# Patient Record
Sex: FEMALE | Race: WHITE | NOT HISPANIC OR LATINO | Employment: FULL TIME | URBAN - METROPOLITAN AREA
[De-identification: names, ages, dates, MRNs, and addresses within clinical notes are randomized per-mention and may not be internally consistent; named-entity substitution may affect disease eponyms.]

---

## 2019-03-22 ENCOUNTER — HOSPITAL ENCOUNTER (OUTPATIENT)
Dept: RADIOLOGY | Facility: HOSPITAL | Age: 59
Discharge: HOME | End: 2019-03-22
Attending: NURSE PRACTITIONER
Payer: COMMERCIAL

## 2019-03-22 ENCOUNTER — OFFICE VISIT (OUTPATIENT)
Dept: FAMILY MEDICINE | Facility: CLINIC | Age: 59
End: 2019-03-22
Payer: COMMERCIAL

## 2019-03-22 VITALS
HEIGHT: 64 IN | TEMPERATURE: 98 F | WEIGHT: 178.81 LBS | SYSTOLIC BLOOD PRESSURE: 138 MMHG | RESPIRATION RATE: 18 BRPM | BODY MASS INDEX: 30.53 KG/M2 | OXYGEN SATURATION: 98 % | HEART RATE: 57 BPM | DIASTOLIC BLOOD PRESSURE: 84 MMHG

## 2019-03-22 DIAGNOSIS — K64.9 HEMORRHOIDS, UNSPECIFIED HEMORRHOID TYPE: ICD-10-CM

## 2019-03-22 DIAGNOSIS — G89.29 CHRONIC RIGHT SHOULDER PAIN: ICD-10-CM

## 2019-03-22 DIAGNOSIS — R20.0 NUMBNESS AND TINGLING OF BOTH FEET: ICD-10-CM

## 2019-03-22 DIAGNOSIS — R91.1 PULMONARY NODULE: ICD-10-CM

## 2019-03-22 DIAGNOSIS — R20.0 NUMBNESS AND TINGLING OF RIGHT ARM: ICD-10-CM

## 2019-03-22 DIAGNOSIS — M54.41 CHRONIC RIGHT-SIDED LOW BACK PAIN WITH RIGHT-SIDED SCIATICA: ICD-10-CM

## 2019-03-22 DIAGNOSIS — G89.29 CHRONIC RIGHT-SIDED LOW BACK PAIN WITH RIGHT-SIDED SCIATICA: ICD-10-CM

## 2019-03-22 DIAGNOSIS — M25.511 CHRONIC RIGHT SHOULDER PAIN: ICD-10-CM

## 2019-03-22 DIAGNOSIS — R20.2 NUMBNESS AND TINGLING OF RIGHT ARM: ICD-10-CM

## 2019-03-22 DIAGNOSIS — F33.1 MODERATE EPISODE OF RECURRENT MAJOR DEPRESSIVE DISORDER: Primary | ICD-10-CM

## 2019-03-22 DIAGNOSIS — R74.8 ELEVATED ALKALINE PHOSPHATASE LEVEL: ICD-10-CM

## 2019-03-22 DIAGNOSIS — K31.84 GASTROPARESIS: ICD-10-CM

## 2019-03-22 DIAGNOSIS — Z11.59 NEED FOR HEPATITIS C SCREENING TEST: ICD-10-CM

## 2019-03-22 DIAGNOSIS — R20.2 NUMBNESS AND TINGLING OF BOTH FEET: ICD-10-CM

## 2019-03-22 DIAGNOSIS — E55.9 VITAMIN D DEFICIENCY: ICD-10-CM

## 2019-03-22 DIAGNOSIS — Z90.3 HISTORY OF GASTRECTOMY: ICD-10-CM

## 2019-03-22 DIAGNOSIS — M54.2 NECK PAIN: ICD-10-CM

## 2019-03-22 DIAGNOSIS — K62.6 RECTAL ULCER: ICD-10-CM

## 2019-03-22 DIAGNOSIS — K62.5 RECTAL BLEEDING: ICD-10-CM

## 2019-03-22 DIAGNOSIS — R19.8 RECTAL PRESSURE: ICD-10-CM

## 2019-03-22 DIAGNOSIS — E03.9 HYPOTHYROIDISM, UNSPECIFIED TYPE: ICD-10-CM

## 2019-03-22 PROCEDURE — 99205 OFFICE O/P NEW HI 60 MIN: CPT | Mod: S$GLB,,, | Performed by: NURSE PRACTITIONER

## 2019-03-22 PROCEDURE — 99999 PR PBB SHADOW E&M-EST. PATIENT-LVL V: ICD-10-PCS | Mod: PBBFAC,,, | Performed by: NURSE PRACTITIONER

## 2019-03-22 PROCEDURE — 72110 X-RAY EXAM L-2 SPINE 4/>VWS: CPT | Mod: TC,FY,PO

## 2019-03-22 PROCEDURE — 3079F DIAST BP 80-89 MM HG: CPT | Mod: CPTII,S$GLB,, | Performed by: NURSE PRACTITIONER

## 2019-03-22 PROCEDURE — 72050 X-RAY EXAM NECK SPINE 4/5VWS: CPT | Mod: 26,,, | Performed by: RADIOLOGY

## 2019-03-22 PROCEDURE — 99999 PR PBB SHADOW E&M-EST. PATIENT-LVL V: CPT | Mod: PBBFAC,,, | Performed by: NURSE PRACTITIONER

## 2019-03-22 PROCEDURE — 3008F BODY MASS INDEX DOCD: CPT | Mod: CPTII,S$GLB,, | Performed by: NURSE PRACTITIONER

## 2019-03-22 PROCEDURE — 3075F PR MOST RECENT SYSTOLIC BLOOD PRESS GE 130-139MM HG: ICD-10-PCS | Mod: CPTII,S$GLB,, | Performed by: NURSE PRACTITIONER

## 2019-03-22 PROCEDURE — 3075F SYST BP GE 130 - 139MM HG: CPT | Mod: CPTII,S$GLB,, | Performed by: NURSE PRACTITIONER

## 2019-03-22 PROCEDURE — 72050 X-RAY EXAM NECK SPINE 4/5VWS: CPT | Mod: TC,FY,PO

## 2019-03-22 PROCEDURE — 72110 XR LUMBAR SPINE COMPLETE 5 VIEW: ICD-10-PCS | Mod: 26,,, | Performed by: RADIOLOGY

## 2019-03-22 PROCEDURE — 3008F PR BODY MASS INDEX (BMI) DOCUMENTED: ICD-10-PCS | Mod: CPTII,S$GLB,, | Performed by: NURSE PRACTITIONER

## 2019-03-22 PROCEDURE — 73030 XR SHOULDER COMPLETE 2 OR MORE VIEWS RIGHT: ICD-10-PCS | Mod: 26,RT,, | Performed by: RADIOLOGY

## 2019-03-22 PROCEDURE — 99205 PR OFFICE/OUTPT VISIT, NEW, LEVL V, 60-74 MIN: ICD-10-PCS | Mod: S$GLB,,, | Performed by: NURSE PRACTITIONER

## 2019-03-22 PROCEDURE — 72110 X-RAY EXAM L-2 SPINE 4/>VWS: CPT | Mod: 26,,, | Performed by: RADIOLOGY

## 2019-03-22 PROCEDURE — 73030 X-RAY EXAM OF SHOULDER: CPT | Mod: 26,RT,, | Performed by: RADIOLOGY

## 2019-03-22 PROCEDURE — 3079F PR MOST RECENT DIASTOLIC BLOOD PRESSURE 80-89 MM HG: ICD-10-PCS | Mod: CPTII,S$GLB,, | Performed by: NURSE PRACTITIONER

## 2019-03-22 PROCEDURE — 72050 XR CERVICAL SPINE COMPLETE 5 VIEW: ICD-10-PCS | Mod: 26,,, | Performed by: RADIOLOGY

## 2019-03-22 PROCEDURE — 73030 X-RAY EXAM OF SHOULDER: CPT | Mod: TC,FY,PO,RT

## 2019-03-22 RX ORDER — BUPROPION HYDROCHLORIDE 300 MG/1
300 TABLET ORAL DAILY
Qty: 90 TABLET | Refills: 1 | Status: SHIPPED | OUTPATIENT
Start: 2019-03-22 | End: 2019-05-22 | Stop reason: SDUPTHER

## 2019-03-22 RX ORDER — ERGOCALCIFEROL 1.25 MG/1
50000 CAPSULE ORAL
Qty: 8 CAPSULE | Refills: 0 | Status: SHIPPED | OUTPATIENT
Start: 2019-03-22 | End: 2019-05-08 | Stop reason: SDUPTHER

## 2019-03-22 RX ORDER — ALENDRONATE SODIUM 70 MG/1
70 TABLET ORAL
COMMUNITY
End: 2019-07-01 | Stop reason: SDUPTHER

## 2019-03-22 RX ORDER — METHYLPREDNISOLONE 4 MG/1
TABLET ORAL
Qty: 1 PACKAGE | Refills: 0 | Status: SHIPPED | OUTPATIENT
Start: 2019-03-22 | End: 2019-04-12

## 2019-03-22 NOTE — PROGRESS NOTES
Subjective:       Patient ID: Chloe Obrien is a 58 y.o. female.    Chief Complaint: Shoulder Pain (right, noticed 4 months ago, tingling and issue raising arm, right leg hurt some times)    This patient is here today as new patient to the clinic.   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   Dr mello - synthroid - hypothyroid only - no surgery   Osteoporosis - per Dex[facility] - on fosamax- encourage to continue taking with  VITD   Has been on contrave with Dr Crespo - but feels that she is having more depression and anxiety and wants to go back to her normal wellbutrin dose and see how she does - does not feel contrave is helping her lose weight     Shoulder Pain    The pain is present in the right shoulder (right neck and numbness into right hand and fingers ). This is a chronic problem. The current episode started more than 1 month ago. There has been no history of extremity trauma. The problem occurs daily. The problem has been gradually worsening. The quality of the pain is described as aching (pressure). Associated symptoms include a limited range of motion, numbness and tingling. Pertinent negatives include no fever, headaches, inability to bear weight, itching, joint locking, joint swelling, stiffness or visual symptoms. Associated symptoms comments: Fingers numb   . The symptoms are aggravated by contact and activity (lying on her shoulder ).     Vitals:    03/22/19 0948   BP: 138/84   Pulse: (!) 57   Resp: 18   Temp: 98.2 °F (36.8 °C)     Review of Systems   Constitutional: Positive for activity change, appetite change and fatigue. Negative for diaphoresis and fever.   HENT: Negative.  Negative for congestion, drooling,  ear discharge, ear pain, hearing loss, mouth sores, nosebleeds, rhinorrhea, sinus pressure, sinus pain, sneezing, sore throat and trouble swallowing.    Eyes: Negative.  Negative for photophobia, discharge, redness and itching.   Respiratory: Negative.  Negative for apnea, cough, choking, chest tightness, shortness of breath and wheezing.    Cardiovascular: Negative.  Negative for chest pain, palpitations and leg swelling.   Gastrointestinal: Positive for abdominal pain, anal bleeding, blood in stool and rectal pain. Negative for abdominal distention, constipation, diarrhea, nausea and vomiting.   Endocrine: Negative.    Genitourinary: Negative.  Negative for decreased urine volume, difficulty urinating, dysuria, enuresis, frequency, genital sores, hematuria, pelvic pain, urgency, vaginal bleeding, vaginal discharge and vaginal pain.   Musculoskeletal: Positive for arthralgias, back pain, myalgias, neck pain and neck stiffness. Negative for gait problem, joint swelling and stiffness.   Skin: Negative.  Negative for color change, itching and rash.   Allergic/Immunologic: Positive for environmental allergies. Negative for food allergies and immunocompromised state.   Neurological: Positive for tingling and numbness. Negative for dizziness, tremors, seizures, syncope, facial asymmetry, speech difficulty, weakness, light-headedness and headaches.   Hematological: Negative.    Psychiatric/Behavioral: Positive for dysphoric mood. Negative for agitation, behavioral problems, confusion, decreased concentration, hallucinations, self-injury, sleep disturbance and suicidal ideas. The patient is nervous/anxious. The patient is not hyperactive.        Past Medical History:   Diagnosis Date    a Mitral valve prolapse     b Hypertension     c Hypercholesterolemia     On Livalo 4 Mg qHS    d Prediabetes     1/25/15 Referred To DM Education; On Glumetza    e Hypothyroidism     On [place] Thyroid    f Osteoporosis     10/21/18  "RXd Fosamax 70 Mg Weekly, And OTC D3 2K IU Daily, And OTC CaCO3 600 Mg Daily    H/O Abnormal Mammogram     F/U Mammograms And U/S Have Been Stable     i 1 PPD X 20+ YRs Tobacco Use Disorder 11/14/2018 11/14/18 I Advocated Smoking Cessation And RXd     i Obstructive Sleep Apnea     i Pulmonary Nodules     Dr. LuisF Evans Will Repeat This In 1 Year; 8/7/18 Low Dose Chest CT = 1 Small Nodule In Each Lung    j Diverticulosis Of Sigmoid, Decending, Transverse, and Ascending Colon; W/O Diverticulitis On 9/24/18 EGD     Dr. KIRAN Heck    j Gastroparesis     Dr. KIRAN Heck On 5/2/17 Continued Her Post Gastric Sleeve Diet    j GERD With H/O Stricture Dilation     Dr. KIRAN Heck    j H/O Adenomatous Colon Polyps     Dr. KIRAN Heck TC On 9/24/18 Rectal Polyps Only; "Repeat TC In 5 YRs"    j H/O H. Pylori Gastritis 2009     Dr. KIRAN Heck; Received Triple Tx Then    j H/O Recurrent Cyclical Nausea     Dr. KIRAN Heck    j H/O Small Rectal Polyps On 9/24/18 TC; Nonbleeding, Biopsies Negative For Malignancy     Dr. Jonh Heck; "Repeat TC IN 5 YRs"    j Medium Internal Hemorrhoids With Rectal Bleeding On 9/24/18 EGD     Dr. KIRAN Heck; Will Schedule Clinic Visit For Hemorrhoid Banding    j Nonalcoholic Steatohepatitis (N.A.S.H.)     Dr. KIRAN Heck On 8/18/16 Ordered A Liver U/S And LFTs    j Obesity S/P Sleeve Gastrectomy With Hiatal hernia Repair 10/2015     Dr. Scot Werner    j RLQ Pain Due To Epiploic Appendagitis 08/2016     Dr. KIRAN Heck TXd This With Augmentin And Naproxen After Cipro And Flagyl Failed    j RUQ Abdominal Pain 5/31/16     6/3/16 ABD/Pelvic CT W/W/O = 1 CM Right Renal Stone Only; 5/31/16 ABD XRays = Unremarkable; 5/31/16 Increased Her PPI To Bid X 1 Week    k H/O Nephrolithiasis     6/3/16 ABD/Pelvic CT W/W/O = 1 CM Right Renal Stone Only; 5/26/15 ABD/Pelvic CT With IV Contrast Only = Positive For A " Nonobstructing Right Renal Stone    l Chronic Recurrent Left Foreleg Pain     n Chronic Insomnia `    On Restoril 30 Mg qHS PRN, Trazodone 100 Mg qHS Caused Dizziness And Headaches    n Depression     12/21/16 Decreased Wellbutrin Further And Increased Zoloft To 100 Mg Daily; 11/8/16 Decreased Wellbutrin-XL To 300 Mg qAM And RXd Zoloft 50 Mg qAM; She Sees A Therapist For This; Lexapro Was Innefective    o Ocular Migraines     q Borderline Vitamin D Deficiency     10/21/18 And 4/4/16 RXd OTC D3 2K Daily    q H/O Abnormal Mammogram     F/U Mammograms And U/S Have Been Stable    Wellness Visit 10/10/2018      Objective:      Physical Exam   Constitutional: She is oriented to person, place, and time. She appears well-developed and well-nourished.   HENT:   Head: Normocephalic and atraumatic.   Right Ear: Hearing, tympanic membrane, external ear and ear canal normal.   Left Ear: Hearing, tympanic membrane, external ear and ear canal normal.   Nose: Nose normal. No mucosal edema, rhinorrhea or sinus tenderness. Right sinus exhibits no maxillary sinus tenderness and no frontal sinus tenderness. Left sinus exhibits no maxillary sinus tenderness and no frontal sinus tenderness.   Mouth/Throat: Uvula is midline, oropharynx is clear and moist and mucous membranes are normal. No oropharyngeal exudate or posterior oropharyngeal edema.   Eyes: Conjunctivae and EOM are normal. Pupils are equal, round, and reactive to light.   Neck: Normal range of motion. Neck supple.   Cardiovascular: Normal rate, regular rhythm, normal heart sounds and intact distal pulses. Exam reveals no friction rub.   No murmur heard.  Pulmonary/Chest: Effort normal and breath sounds normal. No stridor. No respiratory distress. She has no wheezes. She has no rales.   Abdominal: Soft. Bowel sounds are normal.   Musculoskeletal: She exhibits no edema.        Right shoulder: She exhibits decreased range of motion, tenderness, bony tenderness, pain and  decreased strength.        Cervical back: She exhibits decreased range of motion, tenderness, bony tenderness, pain and spasm.        Lumbar back: She exhibits decreased range of motion, tenderness, pain and spasm. She exhibits no laceration.        Right upper leg: She exhibits tenderness.   Along c5-6 dermatomes     Pain and numbness down right leg   Neurological: She is alert and oriented to person, place, and time. No cranial nerve deficit. Coordination normal.   Skin: Skin is warm and dry.   Psychiatric: She has a normal mood and affect. Her behavior is normal. Judgment and thought content normal.   Nursing note and vitals reviewed.      Assessment:       1. Moderate episode of recurrent major depressive disorder    2. Vitamin D deficiency    3. Chronic right-sided low back pain with right-sided sciatica    4. Pulmonary Nodules    5. Hemorrhoids, unspecified hemorrhoid type    6. Rectal ulcer    7. Rectal pressure    8. Numbness and tingling of both feet    9. Rectal bleeding    10. Neck pain    11. Numbness and tingling of right arm    12. Elevated alkaline phosphatase level    13. Need for hepatitis C screening test    14. Bursitis/tendonitis, shoulder    15. Chronic right shoulder pain    16. Hypothyroidism, unspecified type    17. Gastroparesis    18. Obesity S/P Sleeve Gastrectomy 10/2015        Plan:       Moderate episode of recurrent major depressive disorder  -     buPROPion (WELLBUTRIN XL) 300 MG 24 hr tablet; Take 1 tablet (300 mg total) by mouth once daily.  Dispense: 90 tablet; Refill: 1    Vitamin D deficiency  -     ergocalciferol (ERGOCALCIFEROL) 50,000 unit Cap; Take 1 capsule (50,000 Units total) by mouth every 7 days.  Dispense: 8 capsule; Refill: 0    Chronic right-sided low back pain with right-sided sciatica  -     X-Ray Lumbar Spine Complete 5 View; Future; Expected date: 03/22/2019    Pulmonary Nodules  Needs FU with CT 8/2019   Reviewed last CT   Has been heavy smoker in the past 40 PPD  history     Hemorrhoids, unspecified hemorrhoid type  -     Ambulatory referral to General Surgery    Rectal ulcer  -     Ambulatory referral to General Surgery  She has had banding in the past from Dr Heck and Dr Wall     Rectal pressure  -     Ambulatory referral to General Surgery    Numbness and tingling of both feet  -     Vitamin B12; Future; Expected date: 03/22/2019    Rectal bleeding  -     Ferritin; Future; Expected date: 03/22/2019    Neck pain  Numbness and tingling of right arm  -     X-Ray Cervical Spine Complete 5 view; Future; Expected date: 03/22/2019    Elevated alkaline phosphatase level  -     Alkaline phosphatase; Future; Expected date: 03/22/2019    Need for hepatitis C screening test  -     Hepatitis C antibody; Future; Expected date: 03/22/2019    Bursitis/tendonitis, shoulder  -     methylPREDNISolone (MEDROL DOSEPACK) 4 mg tablet; use as directed  Dispense: 1 Package; Refill: 0  -     X-ray Shoulder 2 or More Views Right; Future; Expected date: 03/22/2019  Discussed pain and PT   Related to how overuse     Chronic right shoulder pain  -     methylPREDNISolone (MEDROL DOSEPACK) 4 mg tablet; use as directed  Dispense: 1 Package; Refill: 0  -     X-ray Shoulder 2 or More Views Right; Future; Expected date: 03/22/2019    Hypothyroidism, unspecified type  Follows with Dr Lechuga  On meds     Gastroparesis  Obesity S/P Sleeve Gastrectomy 10/2015  Follows with GI - Dr Heck         >40 min spent with patient     Will FU after labs and xrays

## 2019-03-23 DIAGNOSIS — G89.29 CHRONIC RIGHT-SIDED LOW BACK PAIN WITH RIGHT-SIDED SCIATICA: ICD-10-CM

## 2019-03-23 DIAGNOSIS — R20.0 NUMBNESS AND TINGLING OF BOTH FEET: Primary | ICD-10-CM

## 2019-03-23 DIAGNOSIS — R20.2 NUMBNESS AND TINGLING OF RIGHT ARM: ICD-10-CM

## 2019-03-23 DIAGNOSIS — R20.2 NUMBNESS AND TINGLING OF BOTH FEET: Primary | ICD-10-CM

## 2019-03-23 DIAGNOSIS — R20.0 NUMBNESS AND TINGLING OF RIGHT ARM: ICD-10-CM

## 2019-03-23 DIAGNOSIS — M54.41 CHRONIC RIGHT-SIDED LOW BACK PAIN WITH RIGHT-SIDED SCIATICA: ICD-10-CM

## 2019-03-23 DIAGNOSIS — M54.2 NECK PAIN: ICD-10-CM

## 2019-03-25 ENCOUNTER — TELEPHONE (OUTPATIENT)
Dept: FAMILY MEDICINE | Facility: CLINIC | Age: 59
End: 2019-03-25

## 2019-03-25 DIAGNOSIS — F41.9 ANXIETY: Primary | ICD-10-CM

## 2019-03-25 RX ORDER — HYDROXYZINE HYDROCHLORIDE 25 MG/1
25 TABLET, FILM COATED ORAL 3 TIMES DAILY
Qty: 90 TABLET | Refills: 1 | Status: SHIPPED | OUTPATIENT
Start: 2019-03-25 | End: 2019-05-22 | Stop reason: SDUPTHER

## 2019-03-25 NOTE — TELEPHONE ENCOUNTER
----- Message from Chevy Sandhu sent at 3/25/2019  8:59 AM CDT -----  Type:  Patient Call Back    Who Called:  Pt   Who Left Message for Patient:  Domonique   Does the patient know what this is regarding?: returning a call    Best Call Back Number:  [phone]  Additional Information:  Please call pt and leave a detailed message if there is no answer.

## 2019-04-01 ENCOUNTER — CLINICAL SUPPORT (OUTPATIENT)
Dept: REHABILITATION | Facility: HOSPITAL | Age: 59
End: 2019-04-01
Attending: NURSE PRACTITIONER
Payer: COMMERCIAL

## 2019-04-01 DIAGNOSIS — M54.2 CERVICALGIA: ICD-10-CM

## 2019-04-01 DIAGNOSIS — R20.0 NUMBNESS AND TINGLING OF BOTH UPPER EXTREMITIES: ICD-10-CM

## 2019-04-01 DIAGNOSIS — G89.29 CHRONIC RIGHT SHOULDER PAIN: ICD-10-CM

## 2019-04-01 DIAGNOSIS — M25.511 CHRONIC RIGHT SHOULDER PAIN: ICD-10-CM

## 2019-04-01 DIAGNOSIS — R20.2 NUMBNESS AND TINGLING OF BOTH UPPER EXTREMITIES: ICD-10-CM

## 2019-04-01 PROCEDURE — 97110 THERAPEUTIC EXERCISES: CPT | Mod: PO

## 2019-04-01 PROCEDURE — 97162 PT EVAL MOD COMPLEX 30 MIN: CPT | Mod: PO

## 2019-04-01 NOTE — PLAN OF CARE
[facility]  Physical Therapy Initial Evaluation    Name: Chloe Obrien  Clinic Number: [id number]    Therapy Diagnosis:   Encounter Diagnoses   Name Primary?    Cervicalgia     Chronic right shoulder pain     Numbness and tingling of both upper extremities      Physician: Tatum Kaufman, *    Physician Orders: Eval and treat  Medical Diagnosis from Referral: R20.0,R20.2 (ICD-10-CM) - Numbness and tingling of both feet R20.0,R20.2 (ICD-10-CM) - Numbness and tingling of right arm M54.2 (ICD-10-CM) - Neck pain M71.9,M67.919 (ICD-10-CM) - Bursitis/tendonitis, shoulder M54.41,G89.29 (ICD-10-CM) - Chronic right-sided low back pain with right-sided sciatica     Evaluation Date: 4/1/2019  Authorization Period Expiration: 12/31/2019  Plan of Care Expiration: 7/24/2019  Visit # / Visits authorized: 1/ 20    Time In: 0910  Time Out: 0955  Total Billable Time: 50 minutes    Precautions: Standard    Subjective   Date of onset: over last year pain from the neck pain to down the hands  History of current condition - Chloe reports: chronic neck pain; constant,then around oct started having right shoulder pain and some numbness/tingling that intermittently went down into her hands.    Past Medical History:   Diagnosis Date    a Mitral valve prolapse     b Hypertension     c Hypercholesterolemia     On Livalo 4 Mg qHS    d Prediabetes     1/25/15 Referred To DM Education; On Glumetza    e Hypothyroidism     On [place] Thyroid    f Osteoporosis     10/21/18 RXd Fosamax 70 Mg Weekly, And OTC D3 2K IU Daily, And OTC CaCO3 600 Mg Daily    H/O Abnormal Mammogram     F/U Mammograms And U/S Have Been Stable     i 1 PPD X 20+ YRs Tobacco Use Disorder 11/14/2018 11/14/18 I Advocated Smoking Cessation And RXd     i Obstructive Sleep Apnea     i Pulmonary Nodules     Dr. Luis F Evans Will Repeat This In 1 Year; 8/7/18 Low Dose Chest CT = 1 Small Nodule In Each Lung    j Diverticulosis Of  "Sigmoid, Decending, Transverse, and Ascending Colon; W/O Diverticulitis On 9/24/18 EGD     Dr. KIRAN Heck    j Gastroparesis     Dr. KIRAN Heck On 5/2/17 Continued Her Post Gastric Sleeve Diet    j GERD With H/O Stricture Dilation     Dr. KIRAN Heck    j H/O Adenomatous Colon Polyps     Dr. KIRAN Heck TC On 9/24/18 Rectal Polyps Only; "Repeat TC In 5 YRs"    j H/O H. Pylori Gastritis 2009     Dr. KIRAN Heck; Received Triple Tx Then    j H/O Recurrent Cyclical Nausea     Dr. KIRAN Heck    j H/O Small Rectal Polyps On 9/24/18 TC; Nonbleeding, Biopsies Negative For Malignancy     Dr. Jonh Heck; "Repeat TC IN 5 YRs"    j Medium Internal Hemorrhoids With Rectal Bleeding On 9/24/18 EGD     Dr. KIRAN Heck; Will Schedule Clinic Visit For Hemorrhoid Banding    j Nonalcoholic Steatohepatitis (N.A.S.H.)     Dr. KIRAN Heck On 8/18/16 Ordered A Liver U/S And LFTs    j Obesity S/P Sleeve Gastrectomy With Hiatal hernia Repair 10/2015     Dr. Scot Werner    j RLQ Pain Due To Epiploic Appendagitis 08/2016     Dr. KIRAN Heck TXd This With Augmentin And Naproxen After Cipro And Flagyl Failed    j RUQ Abdominal Pain 5/31/16     6/3/16 ABD/Pelvic CT W/W/O = 1 CM Right Renal Stone Only; 5/31/16 ABD XRays = Unremarkable; 5/31/16 Increased Her PPI To Bid X 1 Week    k H/O Nephrolithiasis     6/3/16 ABD/Pelvic CT W/W/O = 1 CM Right Renal Stone Only; 5/26/15 ABD/Pelvic CT With IV Contrast Only = Positive For A Nonobstructing Right Renal Stone    l Chronic Recurrent Left Foreleg Pain     n Chronic Insomnia `    On Restoril 30 Mg qHS PRN, Trazodone 100 Mg qHS Caused Dizziness And Headaches    n Depression     12/21/16 Decreased Wellbutrin Further And Increased Zoloft To 100 Mg Daily; 11/8/16 Decreased Wellbutrin-XL To 300 Mg qAM And RXd Zoloft 50 Mg qAM; She Sees A Therapist For This; Lexapro Was Innefective    o Ocular Migraines     q " Borderline Vitamin D Deficiency     10/21/18 And 4/4/16 RXd OTC D3 2K Daily    q H/O Abnormal Mammogram     F/U Mammograms And U/S Have Been Stable    Wellness Visit 10/10/2018      Chloe Obrien  has a past surgical history that includes Cholecystectomy; Hysterectomy; Tubal ligation; Carpal tunnel release; Bunionectomy; Colonoscopy; gastric sleeve (Oct 2015 ); Abdominal surgery; Gastrectomy; Hernia repair; and Flexible sigmoidoscopy (Left, 11/26/2018).    Chloe has a current medication list which includes the following prescription(s): alendronate, aspirin, azelastine, bupropion, cholecalciferol (vitamin d3), dexlansoprazole, ergocalciferol, hydroxyzine hcl, hydroxyzine hcl, methylprednisolone, ondansetron, and synthroid.    Review of patient's allergies indicates:   Allergen Reactions    Keflex [cephalexin] Swelling     Other reaction(s): throat swelilng    Levofloxacin Diarrhea        Imaging, X-Ray:Impression       1. There is multilevel degenerative disc and facet disease without fracture or malalignment.  2. Known 8 mm right renal calculus.  3. Prior cholecystectomy.      Electronically signed by: Bakari Coreas MD  Date: 03/22/2019  Time: 12:09   Impression       1. Degenerative disc disease is most apparent at the C5-6 level.  There is no fracture.  There is trace retrolisthesis of C5 on C6.  There is no significant foraminal stenosis.      Electronically signed by: Bakari Coreas MD  Date: 03/22/2019  Time: 12:06   Impression       Mild degenerative arthrosis of the acromioclavicular joint.      Electronically signed by: Red Kirby MD  Date: 03/22/2019  Time: 12:35     Prior Therapy: none  Social History:  lives with their spouse  Occupation: , cleans houses  Prior Level of Function: none  Current Level of Function: limited lifting, raising arm    Pain:  Current 6/10, worst 8/10, best 4/10   Location: right neck  and shoulder   Description: Aching, Dull, Tingling, Numb, Shooting  and pressure  Aggravating Factors: Laying and Lifting  Easing Factors: pain medication and steriod gonzález    Pts goals: decrease pain, be able to do more      Objective   Mental status: oriented x3  Posture/ Alignment: Fair    GAIT DEVIATIONS: Chloe amb with normal gait.      ROM:   AROM  Comment   Flexion: WNL*    Extension: 50%* Felt pain into R shoulder also   Lat Flex R: 50%*    Lat Flex L: 50%*    Rotation R: WNL*    Rotation L: WNL*    *pain      Right shoulder:   AROM:   All planes WNL but with pain with flex/Abd over 90 degrees and end range external rotation    Dermatomes:   Right Left Comment   C4 intact intact    C5 intact intact    C6 intact intact    C7 intact intact    C8 intact intact    T1 intact intact      Myotomes:   Right Left Comment   Shoulder abduction (C5): 4+/5 5/5    Wrist extension (C6): 5/5 5/5    Wrist flexion (C7): 5/5 5/5    Thumb Extension (C8): 5/5 5/5     (T1): 5/5 5/5      R shoulder: External rotation: 4+/5*, internal rotation 5/5,Flexion 4+/5*: all with some increase in pain    DTR:   Right Left Comment   Biceps (C5-6) 2+ 2+    Triceps (C6-7) 2+ 2+          Special Tests:  Cervical radiculopathy   (-) Spurlings A, (-) Cervical Quadrant w/out radicular symptoms, (-) Neck Distraction, (-) Cervical rotation < 60 deg ipsilateral side  Neural tension   (-) Median n ULTT, (-) Ulnar n ULTT, (-) Radial n ULTT     Palpation:  + TTP right upper trap, cervical paraspinals on right    Accessory Motion Assessment  PAIVM: C2-C6 grade IV lateral glides with pain and hypomobility; C2-T2 central PA glides WNL w/ reproduction of some pain      Pt/family was provided educational information, including: role of PT, goals for PT, scheduling - pt verbalized understanding. Discussed insurance plan with pt.     CMS Impairment/Limitation/Restriction for FOTO neckSurvey    Therapist reviewed FOTO scores for Chloe Obrien on 4/1/2019.   FOTO documents entered into EPIC - see Media  section.    Limitation Score: 52%           TREATMENT   Treatment Time In: 0940  Treatment Time Out: 0950  Total Treatment time separate from Evaluation: 10 minutes    Chloe received therapeutic exercises to develop strength, ROM and flexibility for 10 minutes including:  Chin tucks x 10  Upper trap stretch x 30 sec x 3  Levator scap stretch : 30 sec x 3   Yellow theraband: external rotation x 10    Home Exercises and Patient Education Provided    Education provided:   - watch posture  - try to perform chin tucks throughout the day    Written Home Exercises Provided: yes.  Exercises were reviewed and Chloe was able to demonstrate them prior to the end of the session.  Chloe demonstrated good  understanding of the education provided.     See EMR under Media for exercises provided 4/1/2019.    Assessment     Pt presents with right shoulder pain and neck pain with  cervical radiculopathy symptoms. 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 She should benefit from manual therapy and exercises for cervical/thoracic  spine with possible mechanical traction along with R shoulder light strengthening exercises.     Pt prognosis is Good.   Pt will benefit from skilled outpatient Physical Therapy to address the deficits stated above and in the chart below, provide pt/family education, and to maximize pt's level of independence.     Plan of care discussed with patient: Yes  Pt's spiritual, cultural and educational needs considered and patient is agreeable to the plan of care and goals as stated below:     Anticipated Barriers for therapy: none    Medical Necessity is demonstrated by the following  History  Co-morbidities and personal factors that may impact the plan of care Co-morbidities:   depression, difficulty sleeping, HTN, prior abdominal surgery and GERD, Mitral valve prolapse,  migraines    Personal Factors:   lifestyle  attitudes     high   Examination  Body Structures and Functions, activity limitations and participation restrictions that may impact the plan of care Body Regions:   neck  upper extremities    Body Systems:    ROM  strength  gait  transfers    Participation Restrictions:   none    Activity limitations:   Learning and applying knowledge  no deficits    General Tasks and Commands  no deficits    Communication  no deficits    Mobility  lifting and carrying objects  driving (bike, car, motorcycle)    Self care  washing oneself (bathing, drying, washing hands)  caring for body parts (brushing teeth, shaving, grooming)  dressing    Domestic Life  shopping  doing house work (cleaning house, washing dishes, laundry)    Interactions/Relationships  no deficits    Life Areas  employment    Community and Social Life  recreation and leisure         high   Clinical Presentation evolving clinical presentation with changing clinical characteristics moderate   Decision Making/ Complexity Score: moderate     Goals:  Short Term Goals: 4 weeks   Decrease pain with ADLs by 2 points  Decrease FOTO limitations by 5%  Increase R shoulder strength by 1/2 grade    Long Term Goals: 8 weeks   Decrease pain with ADLs by 5 points or more  Decrease FOTO Limitations by 15% or more  FAROM R shoulder w/o pain      Plan    Plan of care Certification: 4/1/2019 to 7/24/2019  Outpatient physical therapy 1- 2 times weekly to include: pt ed, HEP, therapeutic exercises, therapeutic activities, neuromuscular re-education/ balance exercises, manual therapy, Dry needling, and modalities prn. Cont PT for 3 months.      Chidi Paniagua, PT

## 2019-04-02 PROBLEM — R20.0 NUMBNESS AND TINGLING OF BOTH UPPER EXTREMITIES: Status: ACTIVE | Noted: 2019-04-02

## 2019-04-02 PROBLEM — R20.2 NUMBNESS AND TINGLING OF BOTH UPPER EXTREMITIES: Status: ACTIVE | Noted: 2019-04-02

## 2019-04-02 PROBLEM — M25.511 RIGHT SHOULDER PAIN: Status: ACTIVE | Noted: 2019-04-02

## 2019-04-02 PROBLEM — M54.2 CERVICALGIA: Status: ACTIVE | Noted: 2019-04-02

## 2019-04-02 NOTE — PROGRESS NOTES
Physical Therapy Daily Treatment Note     [facility]  [facility] Number: [id number]    Therapy Diagnosis:   Encounter Diagnoses   Name Primary?    Cervicalgia     Right shoulder pain, unspecified chronicity     Numbness and tingling of both upper extremities      Physician: Tatum Kaufman, *    Visit Date: 4/3/2019  Physician Orders: Eval and treat  Medical Diagnosis from Referral: R20.0,R20.2 (ICD-10-CM) - Numbness and tingling of both feet R20.0,R20.2 (ICD-10-CM) - Numbness and tingling of right arm M54.2 (ICD-10-CM) - Neck pain M71.9,M67.919 (ICD-10-CM) - Bursitis/tendonitis, shoulder M54.41,G89.29 (ICD-10-CM) - Chronic right-sided low back pain with right-sided sciatica      Evaluation Date: 4/1/2019  Authorization Period Expiration: 12/31/2019  Plan of Care Expiration: 7/24/2019  Visit # / Visits authorized: 2/ 20     Time In: 0906  Time Out: 1006  Total Billable Time: 50 minutes     Precautions: Standard      Subjective     Pt reports: Pain is a little bit lower today than it normally is.  Pt reports the tingling in the ring and pinky finger is better but she thinks that may be due to the prednisone.  Pt reports she took her last dosage Sunday.  She was compliant with home exercise program. Doing stretches three times a day and the band exercise once a day  Response to previous treatment: sore  Functional change: too soon to tell    Pain: 4/10  Location: right neck and shoulder    Objective     Chloe received therapeutic exercises to develop strength, ROM and flexibility for 45  minutes including:  Seated chin tucks x 20  Upper trap stretch x 30 sec x 3  Levator scap stretch : 30 sec x 3   Yellow theraband: external rotation x 10  Shoulder Rows RTB 2 x 10  Shoulder extension YTB 2 x 10  Chin tucks with ball on wall x 10      Chloe received the following manual therapy techniques: Manual traction and Soft tissue Mobilization were applied to the: neck for 15 minutes, including:  Gentle cervical  traction  Sub occipital release  STM to R upper trap      Home Exercises Provided and Patient Education Provided     Education provided:   - HEP    Written Home Exercises Provided: yes.  Exercises were reviewed and Chloe was able to demonstrate them prior to the end of the session.  Chloe demonstrated good  understanding of the education provided.     See EMR under Patient Instructions for exercises provided 4/3/2019.    Assessment     Patient tolerated treatment well today with no increase in symptoms following treatment.  Pt required verbal cueing to prevent upper trap compensations with shoulder extension exercise; however, able to correct and perform correctly.  Chloe is progressing well towards her goals.   Pt prognosis is Good.     Pt will continue to benefit from skilled outpatient physical therapy to address the deficits listed in the problem list box on initial evaluation, provide pt/family education and to maximize pt's level of independence in the home and community environment.     Pt's spiritual, cultural and educational needs considered and pt agreeable to plan of care and goals.    Anticipated barriers to physical therapy:None    Goals:   Short Term Goals: 4 weeks   Decrease pain with ADLs by 2 points  Decrease FOTO limitations by 5%  Increase R shoulder strength by 1/2 grade     Long Term Goals: 8 weeks   Decrease pain with ADLs by 5 points or more  Decrease FOTO Limitations by 15% or more  FAROM R shoulder w/o pain      Plan     Continue with RAMON Butler PTA

## 2019-04-03 ENCOUNTER — CLINICAL SUPPORT (OUTPATIENT)
Dept: REHABILITATION | Facility: HOSPITAL | Age: 59
End: 2019-04-03
Payer: COMMERCIAL

## 2019-04-03 DIAGNOSIS — R20.0 NUMBNESS AND TINGLING OF BOTH UPPER EXTREMITIES: ICD-10-CM

## 2019-04-03 DIAGNOSIS — M54.2 CERVICALGIA: ICD-10-CM

## 2019-04-03 DIAGNOSIS — M25.511 RIGHT SHOULDER PAIN, UNSPECIFIED CHRONICITY: ICD-10-CM

## 2019-04-03 DIAGNOSIS — R20.2 NUMBNESS AND TINGLING OF BOTH UPPER EXTREMITIES: ICD-10-CM

## 2019-04-03 PROCEDURE — 97110 THERAPEUTIC EXERCISES: CPT | Mod: PO

## 2019-04-03 PROCEDURE — 97140 MANUAL THERAPY 1/> REGIONS: CPT | Mod: PO

## 2019-04-03 NOTE — PATIENT INSTRUCTIONS
Scapular Retraction: Rowing (Eccentric) - Arms - 45 Degrees (Resistance Band)        Hold end of band in each hand. Pull back until elbows are even with trunk. Keep elbows out from sides at 45°, thumbs up. Slowly release for 3-5 seconds. Use __red______ resistance band. 20___ reps per set.     https://ecce.[employer].us/229     Copyright © I. All rights reserved.   EXTENSION: Standing - Resistance Band: Stable (Active)        Stand, right arm at side. Against yellow resistance band, draw arm backward, as far as possible, keeping elbow straight.  Complete _2__ sets of _10__ repetitions. Perform _1-2__ sessions per day.    Copyright © [employer]. All rights reserved.

## 2019-04-04 ENCOUNTER — OFFICE VISIT (OUTPATIENT)
Dept: SURGERY | Facility: CLINIC | Age: 59
End: 2019-04-04
Payer: COMMERCIAL

## 2019-04-04 VITALS
WEIGHT: 178.81 LBS | DIASTOLIC BLOOD PRESSURE: 78 MMHG | HEIGHT: 64 IN | BODY MASS INDEX: 30.53 KG/M2 | SYSTOLIC BLOOD PRESSURE: 147 MMHG | HEART RATE: 71 BPM | TEMPERATURE: 98 F

## 2019-04-04 DIAGNOSIS — K64.9 BLEEDING HEMORRHOIDS: Primary | ICD-10-CM

## 2019-04-04 PROCEDURE — 99999 PR PBB SHADOW E&M-EST. PATIENT-LVL III: CPT | Mod: PBBFAC,,, | Performed by: SURGERY

## 2019-04-04 PROCEDURE — 99243 PR OFFICE CONSULTATION,LEVEL III: ICD-10-PCS | Mod: S$GLB,,, | Performed by: SURGERY

## 2019-04-04 PROCEDURE — 99999 PR PBB SHADOW E&M-EST. PATIENT-LVL III: ICD-10-PCS | Mod: PBBFAC,,, | Performed by: SURGERY

## 2019-04-04 PROCEDURE — 99243 OFF/OP CNSLTJ NEW/EST LOW 30: CPT | Mod: S$GLB,,, | Performed by: SURGERY

## 2019-04-04 RX ORDER — SPIRONOLACTONE 25 MG/1
25 TABLET ORAL DAILY
Refills: 4 | COMMUNITY
Start: 2019-03-15 | End: 2019-05-22

## 2019-04-04 NOTE — Clinical Note
I saw your patient, Chloe Obrien in the office.  Attached are my findings and plan.  Thank you for referring her to my office and if you have any questions please do not hesitate to call my cell [phone].Cristian Leal

## 2019-04-04 NOTE — LETTER
April 4, 2019      Tatum Kaufman, INDIRA  [address] [facility] [address] [place] [address]           [facility]  [address] [facility] [address] [place] [address]  Phone: [phone]          Patient: Chloe Obrien   MR Number: [id number]   YOB: 1960   Date of Visit: 4/4/2019       Dear Tatum Kaufman:    Thank you for referring Chloe Obrien to me for evaluation. Attached you will find relevant portions of my assessment and plan of care.    If you have questions, please do not hesitate to call me. I look forward to following Chloe Obrien along with you.    Sincerely,    Jelani Leal MD    Enclosure  CC:  No Recipients    If you would like to receive this communication electronically, please contact [email or web address] or [phone] to request more information on [employer] Link access.    For providers and/or their staff who would like to refer a patient to [facility], please contact us through our one-stop-shop provider referral line, [facility], at [phone].    If you feel you have received this communication in error or would no longer like to receive these types of communications, please e-mail [email or web address]

## 2019-04-04 NOTE — PROGRESS NOTES
Subjective:       Patient ID: Chloe Obrien is a 58 y.o. female.    Chief Complaint: Consult (Hemorrhoids)    HPI   Pleasant 57 yo F referred to me in consultation from [facility] for evaluation of hemorrhoids. Pt notes that she has been having issues with her hemorrhoids for some time.  Her principal problem has been bleeding from the hemorrhoids.  She has had previous banding by DR Heck with a single column banding 3 separate time.  Her last banding was in Jan.  Pt notes that she has had continued bleeding once a week.  She denies pain.  Has been having irritation and discomfort from the hemorrhoids. Pt also has had difficulty with perianal hygiene  Pt has no other complaints.  NO n/v.  No fever/chills.  Pt has had recent colonoscopy  Review of Systems   Constitutional: Negative for activity change, appetite change, fever and unexpected weight change.   HENT: Negative for congestion and facial swelling.    Respiratory: Negative for chest tightness, shortness of breath and wheezing.    Cardiovascular: Negative for chest pain.   Gastrointestinal: Positive for anal bleeding and rectal pain. Negative for abdominal distention, abdominal pain, blood in stool, constipation, diarrhea, nausea and vomiting.   Genitourinary: Negative for difficulty urinating, dysuria and frequency.   Skin: Negative for color change and wound.   Neurological: Negative for dizziness.   Hematological: Negative for adenopathy. Does not bruise/bleed easily.   Psychiatric/Behavioral: Negative for agitation and decreased concentration.       Objective:      Physical Exam   Constitutional: She is oriented to person, place, and time. She appears well-developed and well-nourished.   HENT:   Head: Normocephalic and atraumatic.   Eyes: Pupils are equal, round, and reactive to light. Right eye exhibits no discharge. Left eye exhibits no discharge. No scleral icterus.   Neck: Normal range of motion. Neck supple. No tracheal deviation present.  No thyromegaly present.   Cardiovascular: Normal rate, regular rhythm and normal heart sounds.   No murmur heard.  Pulmonary/Chest: Effort normal and breath sounds normal. She exhibits no tenderness.   Abdominal: Soft. Bowel sounds are normal. She exhibits no distension, no abdominal bruit, no pulsatile midline mass and no mass. There is no hepatosplenomegaly. There is no tenderness. There is no rigidity, no rebound, no guarding, no tenderness at McBurney's point and negative Gomez's sign. No hernia. Hernia confirmed negative in the ventral area.   Genitourinary: Rectum normal.   Genitourinary Comments: Ext exam demonstrates circumferential ext hemorrhoids.  ANNA with normal sphincter no masses noted.  Anoscopy attempted but not able to be tolerated.     Musculoskeletal: Normal range of motion. She exhibits no edema, tenderness or deformity.   Lymphadenopathy:     She has no cervical adenopathy.   Neurological: She is alert and oriented to person, place, and time.   Skin: Skin is warm. No rash noted. No erythema. No pallor.   Psychiatric: She has a normal mood and affect. Her behavior is normal.   Vitals reviewed.      Assessment:     Hemorrhoids  No diagnosis found.    Plan:       D/w pt I have d/w her that her bleeding is likely from her hemorrhoids.  Could consider banding in the OR with sedation.  Also d/w her that difficulty with hygiene and irritation could be related to her ext hemorrhoids and as such may be beneficial to consider hemorrhoidectomy.  She will consider these options and will contact us if she desires to proceed with surgery or banding

## 2019-04-08 ENCOUNTER — TELEPHONE (OUTPATIENT)
Dept: SURGERY | Facility: CLINIC | Age: 59
End: 2019-04-08

## 2019-04-08 NOTE — TELEPHONE ENCOUNTER
----- Message from Cindy Alfonso sent at 4/8/2019  1:54 PM CDT -----  Contact: Chloe shook  Type: Needs Medical Advice    Who Called:  Chloe Landry Call Back Number: [phone]  Additional Information: Pls call pt regarding the scheduling of her surgery

## 2019-04-10 ENCOUNTER — OFFICE VISIT (OUTPATIENT)
Dept: SURGERY | Facility: CLINIC | Age: 59
End: 2019-04-10
Payer: COMMERCIAL

## 2019-04-10 VITALS
WEIGHT: 184.5 LBS | BODY MASS INDEX: 31.5 KG/M2 | HEIGHT: 64 IN | HEART RATE: 75 BPM | SYSTOLIC BLOOD PRESSURE: 176 MMHG | DIASTOLIC BLOOD PRESSURE: 81 MMHG | TEMPERATURE: 99 F

## 2019-04-10 DIAGNOSIS — K64.4 EXTERNAL HEMORRHOIDS: Primary | ICD-10-CM

## 2019-04-10 PROCEDURE — 99213 PR OFFICE/OUTPT VISIT, EST, LEVL III, 20-29 MIN: ICD-10-PCS | Mod: S$GLB,,, | Performed by: SURGERY

## 2019-04-10 PROCEDURE — 3077F SYST BP >= 140 MM HG: CPT | Mod: CPTII,S$GLB,, | Performed by: SURGERY

## 2019-04-10 PROCEDURE — 3008F PR BODY MASS INDEX (BMI) DOCUMENTED: ICD-10-PCS | Mod: CPTII,S$GLB,, | Performed by: SURGERY

## 2019-04-10 PROCEDURE — 3008F BODY MASS INDEX DOCD: CPT | Mod: CPTII,S$GLB,, | Performed by: SURGERY

## 2019-04-10 PROCEDURE — 99999 PR PBB SHADOW E&M-EST. PATIENT-LVL III: ICD-10-PCS | Mod: PBBFAC,,, | Performed by: SURGERY

## 2019-04-10 PROCEDURE — 99999 PR PBB SHADOW E&M-EST. PATIENT-LVL III: CPT | Mod: PBBFAC,,, | Performed by: SURGERY

## 2019-04-10 PROCEDURE — 3079F DIAST BP 80-89 MM HG: CPT | Mod: CPTII,S$GLB,, | Performed by: SURGERY

## 2019-04-10 PROCEDURE — 99213 OFFICE O/P EST LOW 20 MIN: CPT | Mod: S$GLB,,, | Performed by: SURGERY

## 2019-04-10 PROCEDURE — 3077F PR MOST RECENT SYSTOLIC BLOOD PRESSURE >= 140 MM HG: ICD-10-PCS | Mod: CPTII,S$GLB,, | Performed by: SURGERY

## 2019-04-10 PROCEDURE — 3079F PR MOST RECENT DIASTOLIC BLOOD PRESSURE 80-89 MM HG: ICD-10-PCS | Mod: CPTII,S$GLB,, | Performed by: SURGERY

## 2019-04-10 NOTE — PROGRESS NOTES
Pleasant 57 yo F presents to discuss hemorrhoids    Pt notes that she is feeling well.  Continues to have pain and bleeding from her hemorrhoids.  Notes that she desires to proceed with definitive hemorrhoidectomy for her hemorrhoids      AFVSS  AAOx3  CTA B  Soft/nd/nt  Rectal: deferred today    A/P: hemorrhoids    D/w pt.  I have had lengthy d/w pt explaining risks and benefits of proceudre with her.  She wants definitive mgmt of her hemorrhoids.  Pt aware of pain associated with post op period as well as risks of incontinence and stricture.  She  Has signed informed consent and will call to schedule surgery sometime in may

## 2019-04-12 ENCOUNTER — TELEPHONE (OUTPATIENT)
Dept: SURGERY | Facility: CLINIC | Age: 59
End: 2019-04-12

## 2019-04-12 ENCOUNTER — PATIENT MESSAGE (OUTPATIENT)
Dept: SURGERY | Facility: CLINIC | Age: 59
End: 2019-04-12

## 2019-04-12 DIAGNOSIS — K64.9 HEMORRHOIDS: ICD-10-CM

## 2019-04-12 DIAGNOSIS — K64.9 HEMORRHOIDS, UNSPECIFIED HEMORRHOID TYPE: Primary | ICD-10-CM

## 2019-04-12 RX ORDER — SODIUM CHLORIDE 9 MG/ML
INJECTION, SOLUTION INTRAVENOUS CONTINUOUS
Status: CANCELLED | OUTPATIENT
Start: 2019-04-12

## 2019-04-12 RX ORDER — LIDOCAINE HYDROCHLORIDE 10 MG/ML
1 INJECTION, SOLUTION EPIDURAL; INFILTRATION; INTRACAUDAL; PERINEURAL ONCE
Status: CANCELLED | OUTPATIENT
Start: 2019-04-12 | End: 2019-04-12

## 2019-04-12 NOTE — TELEPHONE ENCOUNTER
Surgery is scheduled for 05/08/19 arrival time per the preop nurse.  Preop will call from [phone]  Fasting after midnight  Someone to drive you home      THE PREOP NURSE WILL CALL, SOMETIMES AS LATE AS 4 PM IN THE AFTERNOON THE DAY BEFORE SURGERY.    Bathe the night before and the morning of your procedure with a Chlorhexidine wash such as Hibiclens, can be purchased at most Pharmacy's.    Special Instruction: Purchase two Fleet Enema;s at your Pharmacy, use one the evening before the procedure and one the morning of the procedure.

## 2019-04-12 NOTE — TELEPHONE ENCOUNTER
----- Message from Cindy Alfonso sent at 4/12/2019  9:31 AM CDT -----  Contact: Chloe shook  Type: Needs Medical Advice    Who Called:  Chloe Landry Call Back Number: [phone]  Additional Information: Pls call pt back regarding having  hemorhoid surgery

## 2019-04-17 ENCOUNTER — TELEPHONE (OUTPATIENT)
Dept: SURGERY | Facility: CLINIC | Age: 59
End: 2019-04-17

## 2019-04-29 ENCOUNTER — TELEPHONE (OUTPATIENT)
Dept: SURGERY | Facility: CLINIC | Age: 59
End: 2019-04-29

## 2019-04-29 NOTE — TELEPHONE ENCOUNTER
----- Message from Colt Mason sent at 4/29/2019  1:29 PM CDT -----  Contact: patient  Type: Needs Medical Advice    Who Called:  patient  Symptoms (please be specific):    How long has patient had these symptoms:    Pharmacy name and phone #:    [facility] Call Back Number: [phone]  Additional Information: requesting a call back from [employer] regarding paperwork for employer regarding time out of work and what type of surgery patient will be having

## 2019-04-29 NOTE — TELEPHONE ENCOUNTER
Patient states that she needs a letter on [facility] Letterhead stating how long she'll be out and what kind of surgery she's having.  I informed her that I can send the notes and projected return to work.  I'll fax the note to [phone] and [phone]. [employer]angel

## 2019-05-06 RX ORDER — TEMAZEPAM 22.5 MG/1
22.5 CAPSULE ORAL NIGHTLY PRN
COMMUNITY
End: 2019-05-22

## 2019-05-07 ENCOUNTER — ANESTHESIA EVENT (OUTPATIENT)
Dept: SURGERY | Facility: HOSPITAL | Age: 59
End: 2019-05-07
Payer: COMMERCIAL

## 2019-05-08 ENCOUNTER — ANESTHESIA (OUTPATIENT)
Dept: SURGERY | Facility: HOSPITAL | Age: 59
End: 2019-05-08
Payer: COMMERCIAL

## 2019-05-08 ENCOUNTER — HOSPITAL ENCOUNTER (OUTPATIENT)
Facility: HOSPITAL | Age: 59
Discharge: HOME | End: 2019-05-08
Attending: SURGERY | Admitting: SURGERY
Payer: COMMERCIAL

## 2019-05-08 DIAGNOSIS — K64.9 HEMORRHOIDS, UNSPECIFIED HEMORRHOID TYPE: ICD-10-CM

## 2019-05-08 DIAGNOSIS — K64.9 HEMORRHOIDS: ICD-10-CM

## 2019-05-08 DIAGNOSIS — E55.9 VITAMIN D DEFICIENCY: ICD-10-CM

## 2019-05-08 PROCEDURE — 27201423 OPTIME MED/SURG SUP & DEVICES STERILE SUPPLY: Mod: PO | Performed by: SURGERY

## 2019-05-08 PROCEDURE — C9290 INJ, BUPIVACAINE LIPOSOME: HCPCS | Mod: PO | Performed by: SURGERY

## 2019-05-08 PROCEDURE — 63600175 PHARM REV CODE 636 W HCPCS: Mod: PO | Performed by: NURSE ANESTHETIST, CERTIFIED REGISTERED

## 2019-05-08 PROCEDURE — 63600175 PHARM REV CODE 636 W HCPCS: Mod: PO | Performed by: ANESTHESIOLOGY

## 2019-05-08 PROCEDURE — 71000015 HC POSTOP RECOV 1ST HR: Mod: PO | Performed by: SURGERY

## 2019-05-08 PROCEDURE — 25000003 PHARM REV CODE 250: Mod: PO | Performed by: ANESTHESIOLOGY

## 2019-05-08 PROCEDURE — D9220A PRA ANESTHESIA: ICD-10-PCS | Mod: ANES,,, | Performed by: ANESTHESIOLOGY

## 2019-05-08 PROCEDURE — S0077 INJECTION, CLINDAMYCIN PHOSP: HCPCS | Mod: PO | Performed by: SURGERY

## 2019-05-08 PROCEDURE — D9220A PRA ANESTHESIA: Mod: CRNA,,, | Performed by: NURSE ANESTHETIST, CERTIFIED REGISTERED

## 2019-05-08 PROCEDURE — 46260 PR HEMORRHOIDECTOMY,INT/EXT, 2+ COLUMNS/GROUPS: ICD-10-PCS | Mod: ,,, | Performed by: SURGERY

## 2019-05-08 PROCEDURE — 37000008 HC ANESTHESIA 1ST 15 MINUTES: Mod: PO | Performed by: SURGERY

## 2019-05-08 PROCEDURE — 46260 REMOVE IN/EX HEM GROUPS 2+: CPT | Mod: ,,, | Performed by: SURGERY

## 2019-05-08 PROCEDURE — 88304 TISSUE EXAM BY PATHOLOGIST: CPT | Mod: 26,,, | Performed by: PATHOLOGY

## 2019-05-08 PROCEDURE — 25000003 PHARM REV CODE 250: Mod: PO | Performed by: NURSE ANESTHETIST, CERTIFIED REGISTERED

## 2019-05-08 PROCEDURE — 25000003 PHARM REV CODE 250: Mod: PO | Performed by: SURGERY

## 2019-05-08 PROCEDURE — 36000707: Mod: PO | Performed by: SURGERY

## 2019-05-08 PROCEDURE — 63600175 PHARM REV CODE 636 W HCPCS: Mod: PO | Performed by: SURGERY

## 2019-05-08 PROCEDURE — 88304 TISSUE SPECIMEN TO PATHOLOGY - SURGERY: ICD-10-PCS | Mod: 26,,, | Performed by: PATHOLOGY

## 2019-05-08 PROCEDURE — 71000033 HC RECOVERY, INTIAL HOUR: Mod: PO | Performed by: SURGERY

## 2019-05-08 PROCEDURE — D9220A PRA ANESTHESIA: Mod: ANES,,, | Performed by: ANESTHESIOLOGY

## 2019-05-08 PROCEDURE — 88304 TISSUE EXAM BY PATHOLOGIST: CPT | Performed by: PATHOLOGY

## 2019-05-08 PROCEDURE — D9220A PRA ANESTHESIA: ICD-10-PCS | Mod: CRNA,,, | Performed by: NURSE ANESTHETIST, CERTIFIED REGISTERED

## 2019-05-08 PROCEDURE — 36000706: Mod: PO | Performed by: SURGERY

## 2019-05-08 PROCEDURE — 37000009 HC ANESTHESIA EA ADD 15 MINS: Mod: PO | Performed by: SURGERY

## 2019-05-08 RX ORDER — HYDROMORPHONE HYDROCHLORIDE 2 MG/ML
0.2 INJECTION, SOLUTION INTRAMUSCULAR; INTRAVENOUS; SUBCUTANEOUS EVERY 5 MIN PRN
Status: DISCONTINUED | OUTPATIENT
Start: 2019-05-08 | End: 2019-05-08 | Stop reason: HOSPADM

## 2019-05-08 RX ORDER — ROCURONIUM BROMIDE 10 MG/ML
INJECTION, SOLUTION INTRAVENOUS
Status: DISCONTINUED | OUTPATIENT
Start: 2019-05-08 | End: 2019-05-08

## 2019-05-08 RX ORDER — LIDOCAINE HCL/PF 100 MG/5ML
SYRINGE (ML) INTRAVENOUS
Status: DISCONTINUED | OUTPATIENT
Start: 2019-05-08 | End: 2019-05-08

## 2019-05-08 RX ORDER — PROPOFOL 10 MG/ML
VIAL (ML) INTRAVENOUS
Status: DISCONTINUED | OUTPATIENT
Start: 2019-05-08 | End: 2019-05-08

## 2019-05-08 RX ORDER — CLINDAMYCIN PHOSPHATE 600 MG/50ML
600 INJECTION, SOLUTION INTRAVENOUS
Status: COMPLETED | OUTPATIENT
Start: 2019-05-08 | End: 2019-05-08

## 2019-05-08 RX ORDER — DEXAMETHASONE SODIUM PHOSPHATE 4 MG/ML
8 INJECTION, SOLUTION INTRA-ARTICULAR; INTRALESIONAL; INTRAMUSCULAR; INTRAVENOUS; SOFT TISSUE
Status: COMPLETED | OUTPATIENT
Start: 2019-05-08 | End: 2019-05-08

## 2019-05-08 RX ORDER — HYDROCODONE BITARTRATE AND ACETAMINOPHEN 5; 325 MG/1; MG/1
1 TABLET ORAL EVERY 6 HOURS PRN
Qty: 30 TABLET | Refills: 0 | Status: SHIPPED | OUTPATIENT
Start: 2019-05-08 | End: 2019-05-15 | Stop reason: SDUPTHER

## 2019-05-08 RX ORDER — SODIUM CHLORIDE 9 MG/ML
INJECTION, SOLUTION INTRAVENOUS CONTINUOUS
Status: DISCONTINUED | OUTPATIENT
Start: 2019-05-08 | End: 2019-05-08 | Stop reason: HOSPADM

## 2019-05-08 RX ORDER — OXYCODONE HYDROCHLORIDE 5 MG/1
5 TABLET ORAL
Status: DISCONTINUED | OUTPATIENT
Start: 2019-05-08 | End: 2019-05-08 | Stop reason: HOSPADM

## 2019-05-08 RX ORDER — SODIUM CHLORIDE, SODIUM LACTATE, POTASSIUM CHLORIDE, CALCIUM CHLORIDE 600; 310; 30; 20 MG/100ML; MG/100ML; MG/100ML; MG/100ML
INJECTION, SOLUTION INTRAVENOUS CONTINUOUS
Status: DISCONTINUED | OUTPATIENT
Start: 2019-05-08 | End: 2019-05-08 | Stop reason: HOSPADM

## 2019-05-08 RX ORDER — FENTANYL CITRATE 50 UG/ML
INJECTION, SOLUTION INTRAMUSCULAR; INTRAVENOUS
Status: DISCONTINUED | OUTPATIENT
Start: 2019-05-08 | End: 2019-05-08

## 2019-05-08 RX ORDER — LIDOCAINE HYDROCHLORIDE 20 MG/ML
JELLY TOPICAL CONTINUOUS PRN
Status: DISCONTINUED | OUTPATIENT
Start: 2019-05-08 | End: 2019-05-08

## 2019-05-08 RX ORDER — KETOROLAC TROMETHAMINE 30 MG/ML
INJECTION, SOLUTION INTRAMUSCULAR; INTRAVENOUS
Status: DISCONTINUED | OUTPATIENT
Start: 2019-05-08 | End: 2019-05-08

## 2019-05-08 RX ORDER — SUCCINYLCHOLINE CHLORIDE 20 MG/ML
INJECTION INTRAMUSCULAR; INTRAVENOUS
Status: DISCONTINUED | OUTPATIENT
Start: 2019-05-08 | End: 2019-05-08

## 2019-05-08 RX ORDER — ONDANSETRON 2 MG/ML
4 INJECTION INTRAMUSCULAR; INTRAVENOUS EVERY 12 HOURS PRN
Status: DISCONTINUED | OUTPATIENT
Start: 2019-05-08 | End: 2019-05-08 | Stop reason: HOSPADM

## 2019-05-08 RX ORDER — ONDANSETRON 2 MG/ML
INJECTION INTRAMUSCULAR; INTRAVENOUS
Status: DISCONTINUED | OUTPATIENT
Start: 2019-05-08 | End: 2019-05-08

## 2019-05-08 RX ORDER — GLYCOPYRROLATE 0.2 MG/ML
INJECTION INTRAMUSCULAR; INTRAVENOUS
Status: DISCONTINUED | OUTPATIENT
Start: 2019-05-08 | End: 2019-05-08

## 2019-05-08 RX ORDER — LIDOCAINE HYDROCHLORIDE 10 MG/ML
0.5 INJECTION, SOLUTION EPIDURAL; INFILTRATION; INTRACAUDAL; PERINEURAL ONCE AS NEEDED
Status: DISCONTINUED | OUTPATIENT
Start: 2019-05-08 | End: 2019-05-08 | Stop reason: HOSPADM

## 2019-05-08 RX ORDER — LIDOCAINE HYDROCHLORIDE 10 MG/ML
1 INJECTION, SOLUTION EPIDURAL; INFILTRATION; INTRACAUDAL; PERINEURAL ONCE
Status: DISCONTINUED | OUTPATIENT
Start: 2019-05-08 | End: 2019-05-08

## 2019-05-08 RX ORDER — MIDAZOLAM HYDROCHLORIDE 1 MG/ML
INJECTION, SOLUTION INTRAMUSCULAR; INTRAVENOUS
Status: DISCONTINUED | OUTPATIENT
Start: 2019-05-08 | End: 2019-05-08

## 2019-05-08 RX ORDER — SODIUM CHLORIDE 0.9 G/100ML
IRRIGANT IRRIGATION
Status: DISCONTINUED | OUTPATIENT
Start: 2019-05-08 | End: 2019-05-08 | Stop reason: HOSPADM

## 2019-05-08 RX ORDER — BUPIVACAINE HYDROCHLORIDE AND EPINEPHRINE 2.5; 5 MG/ML; UG/ML
INJECTION, SOLUTION EPIDURAL; INFILTRATION; INTRACAUDAL; PERINEURAL
Status: DISCONTINUED | OUTPATIENT
Start: 2019-05-08 | End: 2019-05-08 | Stop reason: HOSPADM

## 2019-05-08 RX ORDER — FENTANYL CITRATE 50 UG/ML
25 INJECTION, SOLUTION INTRAMUSCULAR; INTRAVENOUS
Status: DISCONTINUED | OUTPATIENT
Start: 2019-05-08 | End: 2019-05-08 | Stop reason: HOSPADM

## 2019-05-08 RX ORDER — ACETAMINOPHEN 10 MG/ML
INJECTION, SOLUTION INTRAVENOUS
Status: DISCONTINUED | OUTPATIENT
Start: 2019-05-08 | End: 2019-05-08

## 2019-05-08 RX ORDER — HYDROCODONE BITARTRATE AND ACETAMINOPHEN 5; 325 MG/1; MG/1
1 TABLET ORAL EVERY 4 HOURS PRN
Status: DISCONTINUED | OUTPATIENT
Start: 2019-05-08 | End: 2019-05-08 | Stop reason: HOSPADM

## 2019-05-08 RX ORDER — SODIUM CHLORIDE 9 MG/ML
INJECTION, SOLUTION INTRAVENOUS CONTINUOUS
Status: DISCONTINUED | OUTPATIENT
Start: 2019-05-08 | End: 2019-05-08

## 2019-05-08 RX ADMIN — ROCURONIUM BROMIDE 5 MG: 10 INJECTION, SOLUTION INTRAVENOUS at 07:05

## 2019-05-08 RX ADMIN — DEXAMETHASONE SODIUM PHOSPHATE 8 MG: 4 INJECTION, SOLUTION INTRAMUSCULAR; INTRAVENOUS at 06:05

## 2019-05-08 RX ADMIN — KETOROLAC TROMETHAMINE 30 MG: 30 INJECTION, SOLUTION INTRAMUSCULAR; INTRAVENOUS at 07:05

## 2019-05-08 RX ADMIN — SODIUM CHLORIDE, SODIUM LACTATE, POTASSIUM CHLORIDE, AND CALCIUM CHLORIDE: .6; .31; .03; .02 INJECTION, SOLUTION INTRAVENOUS at 06:05

## 2019-05-08 RX ADMIN — CLINDAMYCIN IN 5 PERCENT DEXTROSE 600 MG: 12 INJECTION, SOLUTION INTRAVENOUS at 07:05

## 2019-05-08 RX ADMIN — ACETAMINOPHEN 1000 MG: 10 INJECTION, SOLUTION INTRAVENOUS at 07:05

## 2019-05-08 RX ADMIN — FENTANYL CITRATE 25 MCG: 50 INJECTION INTRAMUSCULAR; INTRAVENOUS at 08:05

## 2019-05-08 RX ADMIN — SUCCINYLCHOLINE CHLORIDE 140 MG: 20 INJECTION, SOLUTION INTRAMUSCULAR; INTRAVENOUS at 07:05

## 2019-05-08 RX ADMIN — FENTANYL CITRATE 100 MCG: 50 INJECTION, SOLUTION INTRAMUSCULAR; INTRAVENOUS at 07:05

## 2019-05-08 RX ADMIN — MIDAZOLAM HYDROCHLORIDE 2 MG: 1 INJECTION, SOLUTION INTRAMUSCULAR; INTRAVENOUS at 07:05

## 2019-05-08 RX ADMIN — KETAMINE HYDROCHLORIDE 20 MG: 100 INJECTION, SOLUTION, CONCENTRATE INTRAMUSCULAR; INTRAVENOUS at 07:05

## 2019-05-08 RX ADMIN — ONDANSETRON 4 MG: 2 INJECTION, SOLUTION INTRAMUSCULAR; INTRAVENOUS at 07:05

## 2019-05-08 RX ADMIN — GLYCOPYRROLATE 0.2 MG: 0.2 INJECTION, SOLUTION INTRAMUSCULAR; INTRAVENOUS at 07:05

## 2019-05-08 RX ADMIN — LIDOCAINE HYDROCHLORIDE 100 MG: 20 INJECTION PARENTERAL at 07:05

## 2019-05-08 RX ADMIN — LIDOCAINE HYDROCHLORIDE 2 %: 20 JELLY TOPICAL at 07:05

## 2019-05-08 RX ADMIN — PROPOFOL 200 MG: 10 INJECTION, EMULSION INTRAVENOUS at 07:05

## 2019-05-08 NOTE — ANESTHESIA POSTPROCEDURE EVALUATION
Anesthesia Post Evaluation    Patient: Chloe Obrien    Procedure(s) Performed: Procedure(s) (LRB):  HEMORRHOIDECTOMY (N/A)    Final Anesthesia Type: general  Patient location during evaluation: PACU  Patient participation: Yes- Able to Participate  Level of consciousness: awake and alert, oriented and awake  Post-procedure vital signs: reviewed and stable  Pain management: adequate  Airway patency: patent  PONV status at discharge: No PONV  Anesthetic complications: no      Cardiovascular status: blood pressure returned to baseline and hemodynamically stable  Respiratory status: unassisted, spontaneous ventilation and room air  Hydration status: euvolemic  Follow-up not needed.          Vitals Value Taken Time   /73 5/8/2019  9:06 AM   Temp 36.1 °C (97 °F) 5/8/2019  8:06 AM   Pulse 62 5/8/2019  9:06 AM   Resp 14 5/8/2019  9:06 AM   SpO2 98 % 5/8/2019  9:06 AM         Event Time     Out of Recovery 08:07:00          Pain/Boy Score: Pain Rating Prior to Med Admin: 5 (5/8/2019  8:36 AM)  Pain Rating Post Med Admin: 0 (5/8/2019  9:06 AM)  Boy Score: 10 (5/8/2019  9:06 AM)

## 2019-05-08 NOTE — BRIEF OP NOTE
[facility]  Brief Operative Note     SUMMARY     Surgery Date: 5/8/2019     Surgeon(s) and Role:     * Jelani Leal MD - Primary    Assisting Surgeon: None    Pre-op Diagnosis:  Hemorrhoids, unspecified hemorrhoid type [K64.9]    Post-op Diagnosis:  Post-Op Diagnosis Codes:     * Hemorrhoids, unspecified hemorrhoid type [K64.9]    Procedure(s) (LRB):  HEMORRHOIDECTOMY (N/A)    Anesthesia: General/MAC    Description of the findings of the procedure: Large hemorrhoids in all 3 columns.  E column hemorrhoidectomy performed without event.     Findings/Key Components: see above.    Estimated Blood Loss:10 cc's       Specimens:   Specimen (12h ago, onward)    Start     Ordered    05/08/19 0726  Specimen to Pathology - Surgery  Once     Comments:  Pre-op Diagnosis: Hemorrhoids, unspecified hemorrhoid type [K64.9]Post-op Diagnosis: SAMEProcedure(s):HEMORRHOIDECTOMY Number of specimens:1Name of specimens: 1.HEMORRHOIDS     Start Status     05/08/19 0726 Collected (05/08/19 0727) Order ID: [id number]       05/08/19 0726          Discharge Note    SUMMARY     Admit Date: 5/8/2019    Discharge Date and Time:  05/08/2019 7:45 AM    Hospital Course (synopsis of major diagnoses, care, treatment, and services provided during the course of the hospital stay): Pt presented for hemorrhoidectomy.  Procedure and post op course were uneventful and pt was discharged to home.        Final Diagnosis: Post-Op Diagnosis Codes:     * Hemorrhoids, unspecified hemorrhoid type [K64.9]    Disposition: Home or Self Care    Follow Up/Patient Instructions:     Medications:  Reconciled Home Medications:      Medication List      START taking these medications    HYDROcodone-acetaminophen 5-325 mg per tablet  Commonly known as:  NORCO  Take 1 tablet by mouth every 6 (six) hours as needed for Pain.        ASK your doctor about these medications    alendronate 70 MG tablet  Commonly known as:  FOSAMAX  Take 70 mg by mouth every 7  days.     aspirin 81 MG EC tablet  Commonly known as:  ECOTRIN  Take 81 mg by mouth once daily.     azelastine 137 mcg (0.1 %) nasal spray  Commonly known as:  ASTELIN  2 sprays by Nasal route as needed.     buPROPion 300 MG 24 hr tablet  Commonly known as:  WELLBUTRIN XL  Take 1 tablet (300 mg total) by mouth once daily.     cholecalciferol (vitamin D3) 2,000 unit Tab  Commonly known as:  VITAMIN D3  Take 1 tablet (2,000 Units total) by mouth once daily.     dexlansoprazole 60 mg capsule  Commonly known as:  DEXILANT  Take 60 mg by mouth once daily.     ergocalciferol 50,000 unit Cap  Commonly known as:  ERGOCALCIFEROL  Take 1 capsule (50,000 Units total) by mouth every 7 days.     * hydrOXYzine HCl 10 MG Tab  Commonly known as:  ATARAX  Take 1 tablet by mouth 3 (three) times daily as needed.     * hydrOXYzine HCl 25 MG tablet  Commonly known as:  ATARAX  Take 1 tablet (25 mg total) by mouth 3 (three) times daily.     ondansetron 4 MG tablet  Commonly known as:  ZOFRAN  Take 1 tablet (4 mg total) by mouth every 12 (twelve) hours as needed for Nausea.     spironolactone 25 MG tablet  Commonly known as:  ALDACTONE  Take 25 mg by mouth once daily.     SYNTHROID 112 MCG tablet  Generic drug:  levothyroxine  Take 112 mcg by mouth once daily.     temazepam 22.5 MG capsule  Commonly known as:  RESTORIL  Take 22.5 mg by mouth nightly as needed for Insomnia.         * This list has 2 medication(s) that are the same as other medications prescribed for you. Read the directions carefully, and ask your doctor or other care provider to review them with you.              Discharge Procedure Orders   Diet general     Call MD for:  extreme fatigue     Call MD for:  persistent dizziness or light-headedness     Call MD for:  hives     Call MD for:  redness, tenderness, or signs of infection (pain, swelling, redness, odor or green/yellow discharge around incision site)     Call MD for:  difficulty breathing, headache or visual  disturbances     Call MD for:  severe uncontrolled pain     Call MD for:  persistent nausea and vomiting     Call MD for:  temperature >100.4     Activity as tolerated     Follow-up Information     Jelani Leal MD In 2 weeks.    Specialties:  General Surgery, Colon and Rectal Surgery, Surgery  Contact information:  [address] [place] [address]  [phone]

## 2019-05-08 NOTE — DISCHARGE INSTRUCTIONS
Exparel(bupivacaine) has been injected to provide approximately 72 hours of reduced pain after your surgery.  Do not remove the bracelet for five days  Report to your doctor as soon as possible the following:   Restlessness   Anxiety   Speech problems,    Lightheadedness   Numbness and tingling of the mouth and lips   Seizures    Metallic taste   Blurred vision   Tremors    Twitching   Depression   Extreme drowsiness  Avoid additional use of local anesthetics (such as dental procedures) for five days (96 hours)           Department of General Surgery  [facility]  HEMORROIDECTOMY/ANORECTAL SURGERY    DOS:   Avoid strenuous activity for 48 hours   Expect a small amount of bleeding after bowel movements.   Perform sitz baths 2-3 times per day and after bowel movements.   Begin a diet high fiber within 24 hours.   Avoid alcohol and milk products.   Take a fiber supplement and stool softener daily.    Drink 8-12 glasses of water throughout the day to prevent constipation.   May shower in 24 hours   May remove outer dressing with first bowel movement.   Rest on your side to relieve pressure on surgical area for the first 24 hours.   Resume home medications as prescribed.    DONT:   Do not use laxatives unless your physician directs you to.   No driving for 24 hours or while taking narcotic pain medication.   DO NOT TAKE ADDITIONAL TYLENOL/ACETAMINOPHEN WHILE TAKING NARCOTIC PAIN MEDICINE THAT CONTAINS TYLENOL/ACETAMINOPHEN.    CALL PHYSICIAN FOR:   Excessive bleeding/Fever greater then 101.5.   Drainage from the incision site that appears infected.   Persistent pain not relieved by pain medication.   Stools too hard or loose.    RETURN APPOINTMENT: Call to schedule appointment in 4-6 weeks    FOR EMERGENCIES: Call your doctor at [phone]          Discharge Instructions: After Your Surgery  Youve just had surgery. During surgery, you were given medicine called anesthesia to keep you  relaxed and free of pain. After surgery, you may have some pain or nausea. This is common. Here are some tips for feeling better and getting well after surgery.     Stay on schedule with your medicine.   Going home  Your healthcare provider will show you how to take care of yourself when you go home. He or she will also answer your questions. Have an adult family member or friend drive you home. For the first 24 hours after your surgery:  · Do not drive or use heavy equipment.  · Do not make important decisions or sign legal papers.  · Do not drink alcohol.  · Have someone stay with you, if needed. He or she can watch for problems and help keep you safe.  Be sure to go to all follow-up visits with your healthcare provider. And rest after your surgery for as long as your healthcare provider tells you to.  Coping with pain  If you have pain after surgery, pain medicine will help you feel better. Take it as told, before pain becomes severe. Also, ask your healthcare provider or pharmacist about other ways to control pain. This might be with heat, ice, or relaxation. And follow any other instructions your surgeon or nurse gives you.  Tips for taking pain medicine  To get the best relief possible, remember these points:  · Pain medicines can upset your stomach. Taking them with a little food may help.  · Most pain relievers taken by mouth need at least 20 to 30 minutes to start to work.  · Taking medicine on a schedule can help you remember to take it. Try to time your medicine so that you can take it before starting an activity. This might be before you get dressed, go for a walk, or sit down for dinner.  · Constipation is a common side effect of pain medicines. Call your healthcare provider before taking any medicines such as laxatives or stool softeners to help ease constipation. Also ask if you should skip any foods. Drinking lots of fluids and eating foods such as fruits and vegetables that are high in fiber can  also help. Remember, do not take laxatives unless your surgeon has prescribed them.  · Drinking alcohol and taking pain medicine can cause dizziness and slow your breathing. It can even be deadly. Do not drink alcohol while taking pain medicine.  · Pain medicine can make you react more slowly to things. Do not drive or run machinery while taking pain medicine.  Your healthcare provider may tell you to take acetaminophen to help ease your pain. Ask him or her how much you are supposed to take each day. Acetaminophen or other pain relievers may interact with your prescription medicines or other over-the-counter (OTC) medicines. Some prescription medicines have acetaminophen and other ingredients. Using both prescription and OTC acetaminophen for pain can cause you to overdose. Read the labels on your OTC medicines with care. This will help you to clearly know the list of ingredients, how much to take, and any warnings. It may also help you not take too much acetaminophen. If you have questions or do not understand the information, ask your pharmacist or healthcare provider to explain it to you before you take the OTC medicine.  Managing nausea  Some people have an upset stomach after surgery. This is often because of anesthesia, pain, or pain medicine, or the stress of surgery. These tips will help you handle nausea and eat healthy foods as you get better. If you were on a special food plan before surgery, ask your healthcare provider if you should follow it while you get better. These tips may help:  · Do not push yourself to eat. Your body will tell you when to eat and how much.  · Start off with clear liquids and soup. They are easier to digest.  · Next try semi-solid foods, such as mashed potatoes, applesauce, and gelatin, as you feel ready.  · Slowly move to solid foods. Dont eat fatty, rich, or spicy foods at first.  · Do not force yourself to have 3 large meals a day. Instead eat smaller amounts more  often.  · Take pain medicines with a small amount of solid food, such as crackers or toast, to avoid nausea.     Call your surgeon if  · You still have pain an hour after taking medicine. The medicine may not be strong enough.  · You feel too sleepy, dizzy, or groggy. The medicine may be too strong.  · You have side effects like nausea, vomiting, or skin changes, such as rash, itching, or hives.       If you have obstructive sleep apnea  You were given anesthesia medicine during surgery to keep you comfortable and free of pain. After surgery, you may have more apnea spells because of this medicine and other medicines you were given. The spells may last longer than usual.   At home:  · Keep using the continuous positive airway pressure (CPAP) device when you sleep. Unless your healthcare provider tells you not to, use it when you sleep, day or night. CPAP is a common device used to treat obstructive sleep apnea.  · Talk with your provider before taking any pain medicine, muscle relaxants, or sedatives. Your provider will tell you about the possible dangers of taking these medicines.  Date Last Reviewed: 12/1/2016  © 7789-7934 [employer]. [address] [place] [address], [place] [address]. All rights reserved. This information is not intended as a substitute for professional medical care. Always follow your healthcare professional's instructions.

## 2019-05-08 NOTE — H&P
HPI   Pleasant 59 yo F referred to me in consultation from [facility] for evaluation of hemorrhoids. Pt notes that she has been having issues with her hemorrhoids for some time.  Her principal problem has been bleeding from the hemorrhoids.  She has had previous banding by DR Heck with a single column banding 3 separate time.  Her last banding was in Jan.  Pt notes that she has had continued bleeding once a week.  She denies pain.  Has been having irritation and discomfort from the hemorrhoids. Pt also has had difficulty with perianal hygiene  Pt has no other complaints.  NO n/v.  No fever/chills.  Pt has had recent colonoscopy  Review of Systems   Constitutional: Negative for activity change, appetite change, fever and unexpected weight change.   HENT: Negative for congestion and facial swelling.    Respiratory: Negative for chest tightness, shortness of breath and wheezing.    Cardiovascular: Negative for chest pain.   Gastrointestinal: Positive for anal bleeding and rectal pain. Negative for abdominal distention, abdominal pain, blood in stool, constipation, diarrhea, nausea and vomiting.   Genitourinary: Negative for difficulty urinating, dysuria and frequency.   Skin: Negative for color change and wound.   Neurological: Negative for dizziness.   Hematological: Negative for adenopathy. Does not bruise/bleed easily.   Psychiatric/Behavioral: Negative for agitation and decreased concentration.       Objective:   Physical Exam   Constitutional: She is oriented to person, place, and time. She appears well-developed and well-nourished.   HENT:   Head: Normocephalic and atraumatic.   Eyes: Pupils are equal, round, and reactive to light. Right eye exhibits no discharge. Left eye exhibits no discharge. No scleral icterus.   Neck: Normal range of motion. Neck supple. No tracheal deviation present. No thyromegaly present.   Cardiovascular: Normal rate, regular rhythm and normal heart sounds.   No murmur  heard.  Pulmonary/Chest: Effort normal and breath sounds normal. She exhibits no tenderness.   Abdominal: Soft. Bowel sounds are normal. She exhibits no distension, no abdominal bruit, no pulsatile midline mass and no mass. There is no hepatosplenomegaly. There is no tenderness. There is no rigidity, no rebound, no guarding, no tenderness at McBurney's point and negative Gomez's sign. No hernia. Hernia confirmed negative in the ventral area.   Genitourinary: Rectum normal.   Genitourinary Comments: Ext exam demonstrates circumferential ext hemorrhoids.  ANNA with normal sphincter no masses noted.  Anoscopy attempted but not able to be tolerated.     Musculoskeletal: Normal range of motion. She exhibits no edema, tenderness or deformity.   Lymphadenopathy:     She has no cervical adenopathy.   Neurological: She is alert and oriented to person, place, and time.   Skin: Skin is warm. No rash noted. No erythema. No pallor.   Psychiatric: She has a normal mood and affect. Her behavior is normal.   Vitals reviewed.      Assessment:     Hemorrhoids  No diagnosis found.    Plan:       D/w pt I have d/w her that her bleeding is likely from her hemorrhoids.  Pt desires to proceed with hemorrhoidectomy

## 2019-05-08 NOTE — TRANSFER OF CARE
"Anesthesia Transfer of Care Note    Patient: Chloe Obrien    Procedure(s) Performed: Procedure(s) (LRB):  HEMORRHOIDECTOMY (N/A)    Patient location: PACU    Anesthesia Type: general    Transport from OR: Transported from OR on room air with adequate spontaneous ventilation    Post pain: adequate analgesia    Post assessment: no apparent anesthetic complications and tolerated procedure well    Post vital signs: stable    Level of consciousness: awake    Nausea/Vomiting: no nausea/vomiting    Complications: none    Transfer of care protocol was followed      Last vitals:   Visit Vitals  BP (!) 184/80 (BP Location: Right arm, Patient Position: Sitting)   Pulse 63   Temp 36.4 °C (97.5 °F) (Skin)   Resp 16   Ht 5' 4" (1.626 m)   Wt 81.6 kg (180 lb)   SpO2 97%   Breastfeeding? No   BMI 30.90 kg/m²     "

## 2019-05-08 NOTE — ANESTHESIA PREPROCEDURE EVALUATION
05/08/2019  Chloe Obrien is a 58 y.o., female.    Pre-op Assessment    I have reviewed the Patient Summary Reports.     I have reviewed the Nursing Notes.   I have reviewed the Medications.     Review of Systems  Social:  Former Smoker    Hematology/Oncology:        Hematology Comments: 11.4/33.7   Cardiovascular:   Hypertension MVP   Pulmonary:   Sleep Apnea    Renal/:   renal calculi    Hepatic/GI:   Hiatal Hernia, GERD Liver Disease, Hepatitis S/P sleeve gastrectomy  Rectal bleeding   Neurological:   Headaches    Endocrine:   Hypothyroidism        Physical Exam  General:  Well nourished Rectal bleeding                 Anesthesia Plan  Type of Anesthesia, risks & benefits discussed:  Anesthesia Type:  general  Patient's Preference: General  Intra-op Monitoring Plan: standard ASA monitors  Intra-op Monitoring Plan Comments:   Post Op Pain Control Plan: multimodal analgesia and per primary service following discharge from PACU  Post Op Pain Control Plan Comments:   Induction:   IV  Beta Blocker:  Patient is not currently on a Beta-Blocker (No further documentation required).       Informed Consent: Patient understands risks and agrees with Anesthesia plan.  Questions answered. Anesthesia consent signed with patient.  ASA Score: 3     Day of Surgery Review of History & Physical: I have interviewed and examined the patient. I have reviewed the patient's H&P dated:    H&P update referred to the surgeon.     Anesthesia Plan Notes: Patient understands and accepts the risk of dental injury.        Ready For Surgery From Anesthesia Perspective.

## 2019-05-08 NOTE — OP NOTE
DATE OF PROCEDURE:  05/08/2019    STAFF SURGEON:  Jelani Leal M.D.    PREOPERATIVE DIAGNOSIS:  Hemorrhoids.    POSTOPERATIVE DIAGNOSIS:  Hemorrhoids.    PROCEDURE:  Three-column hemorrhoidectomy.    ANESTHESIA:  General endotracheal anesthesia.    INDICATION FOR PROCEDURE:  This is a pleasant 58-year-old female who presented   to my office with complaints of persistent bleeding from hemorrhoids.  She had   had previous banding at an outside facility, which was unsuccessful.  The   patient had bleeding from the hemorrhoids, also significant difficulty with   hygiene and discomfort from external hemorrhoids and desired to have true   hemorrhoidectomy.    DESCRIPTION OF PROCEDURE:  Following signing of informed consent, she received   preoperative antibiotics, taken to the OR and placed in supine position.    General anesthesia was administered.  She was rolled and placed in prone   jackknife position with the perianal area prepped and draped and taped apart.    Having performed appropriate timeout procedure, I began on the right posterior   column.  I triangulated the external hemorrhoidal portion.  Digital rectal exam   had been done.  No palpable masses appreciated.  Anoscope was inserted.  The   patient has large right posterior and left lateral column medium-sized right   anterior column.  Having triangulating the right posterior column, I used   Harmonic scalpel  the external hemorrhoid from the internal sphincter   muscle and proceeding proximally all the way down to the end of the internal   hemorrhoids.  I removed this and ligated this uneventfully and then oversewed   the defect with a 3-0 Vicryl sutures, sent this specimen off the field.  I then   repeated the same in the right posterior with the right posterior hemorrhoid.  I   triangulated the external portion  from the internal sphincter muscle   and proceeding proximally until at the base of the hemorrhoid column and   ligating  this.  I oversewed the defect with a locking 3-0 Vicryl suture.  I then   lastly repeated this in the left lateral position.  I irrigated and ensured   adequate hemostasis with all three columns.  I placed Gelfoam into the anal   canal.  I did inject the area with Exparel mixed with Marcaine.  The patient was   awakened and taken to the Recovery Room in stable condition.  There were no   immediate complications.  Blood loss was approximately 25 mL.      IVY/IN  dd: 05/08/2019 07:48:08 (CDT)  td: 05/08/2019 13:12:18 (NANCY)  Doc ID   #[id number]  Job ID #[id number]    CC:

## 2019-05-09 VITALS
HEART RATE: 62 BPM | HEIGHT: 64 IN | OXYGEN SATURATION: 98 % | BODY MASS INDEX: 30.73 KG/M2 | WEIGHT: 180 LBS | DIASTOLIC BLOOD PRESSURE: 73 MMHG | TEMPERATURE: 97 F | SYSTOLIC BLOOD PRESSURE: 153 MMHG | RESPIRATION RATE: 14 BRPM

## 2019-05-09 RX ORDER — ERGOCALCIFEROL 1.25 MG/1
CAPSULE ORAL
Qty: 8 CAPSULE | Refills: 0 | Status: SHIPPED | OUTPATIENT
Start: 2019-05-09 | End: 2019-05-22 | Stop reason: SDUPTHER

## 2019-05-10 ENCOUNTER — TELEPHONE (OUTPATIENT)
Dept: SURGERY | Facility: CLINIC | Age: 59
End: 2019-05-10

## 2019-05-10 NOTE — TELEPHONE ENCOUNTER
----- Message from Jenn Rae sent at 5/10/2019 10:49 AM CDT -----  Contact: [phone]  Pt asking for a call. Has questions regarding meds for stool problem.. thanks

## 2019-05-14 ENCOUNTER — NURSE TRIAGE (OUTPATIENT)
Dept: ADMINISTRATIVE | Facility: CLINIC | Age: 59
End: 2019-05-14

## 2019-05-14 NOTE — TELEPHONE ENCOUNTER
Reason for Disposition   [1] Pus or bad-smelling fluid draining from incision AND [2] no fever    Protocols used: POST-OP INCISION SYMPTOMS-A-AH    Pt c/o rectal bleeding with odor. States she is having to change pad every 3-4 hours. Not fully saturated pad but is going to plastic part of pad. Pt does not have appt until 5/29. Protocol suggest pt be seen in next 4 hours. please call pt to advise/schedule, thanks. [phone].

## 2019-05-14 NOTE — TELEPHONE ENCOUNTER
Reason for Disposition   No answer.  First attempt to contact caller.  Follow-up call scheduled within 15 minutes.   Second attempt to contact family AND no contact made. Phone number verified.    Protocols used: NO CONTACT OR DUPLICATE CONTACT CALL-A-OH    Message from Sangeetha Eden sent at 5/14/2019 10:03 AM CDT     Summary: Concerns regarding procedure done last week           05/14/2019 09:56 AM Phone (Incoming) [facility] (Self) [phone] (H) Sangeetha Eden     Unable to confirm above message, as was unable to reach Chloe with two phone attempts.  Message to Dr Leal, surgery, as well at to her pcp, Keron Crespo MD. Please contact patient directly with any additional care advice.

## 2019-05-15 ENCOUNTER — OFFICE VISIT (OUTPATIENT)
Dept: SURGERY | Facility: CLINIC | Age: 59
End: 2019-05-15
Payer: COMMERCIAL

## 2019-05-15 VITALS — HEIGHT: 64 IN | BODY MASS INDEX: 30.3 KG/M2 | WEIGHT: 177.5 LBS | TEMPERATURE: 99 F

## 2019-05-15 DIAGNOSIS — Z09 POSTOP CHECK: Primary | ICD-10-CM

## 2019-05-15 PROCEDURE — 99999 PR PBB SHADOW E&M-EST. PATIENT-LVL III: ICD-10-PCS | Mod: PBBFAC,,, | Performed by: SURGERY

## 2019-05-15 PROCEDURE — 99999 PR PBB SHADOW E&M-EST. PATIENT-LVL III: CPT | Mod: PBBFAC,,, | Performed by: SURGERY

## 2019-05-15 PROCEDURE — 99024 POSTOP FOLLOW-UP VISIT: CPT | Mod: S$GLB,,, | Performed by: SURGERY

## 2019-05-15 PROCEDURE — 99024 PR POST-OP FOLLOW-UP VISIT: ICD-10-PCS | Mod: S$GLB,,, | Performed by: SURGERY

## 2019-05-15 RX ORDER — HYDROCODONE BITARTRATE AND ACETAMINOPHEN 5; 325 MG/1; MG/1
1 TABLET ORAL EVERY 6 HOURS PRN
Qty: 20 TABLET | Refills: 0 | Status: SHIPPED | OUTPATIENT
Start: 2019-05-15 | End: 2019-05-22

## 2019-05-15 RX ORDER — METRONIDAZOLE 500 MG/1
500 TABLET ORAL EVERY 8 HOURS
Qty: 30 TABLET | Refills: 0 | Status: SHIPPED | OUTPATIENT
Start: 2019-05-15 | End: 2019-05-22

## 2019-05-15 RX ORDER — TEMAZEPAM 30 MG/1
30 CAPSULE ORAL NIGHTLY
Refills: 3 | COMMUNITY
Start: 2019-04-16 | End: 2019-05-22

## 2019-05-15 NOTE — PROGRESS NOTES
Cc: post op    HPI: 58 y.o.  female  1 weeks s/p hemorrhoidectomy.   Pt notes that she is feeling ok.  Having pain particularly with Bm.  Notes bleeding and swelling.  Some occasional discharge    PE: AFVSS    AAOx3  CTA  Soft/NT/nd  Inc: open with swelling in the perianal area.     Path:   FINAL PATHOLOGIC DIAGNOSIS  HEMORRHOIDS    A/P:   Pt doing well post surgery.   Pain pills given  Flagyl given  PT to RTC in 3 weeks

## 2019-06-05 ENCOUNTER — OFFICE VISIT (OUTPATIENT)
Dept: SURGERY | Facility: CLINIC | Age: 59
End: 2019-06-05
Payer: COMMERCIAL

## 2019-06-05 VITALS — BODY MASS INDEX: 31.54 KG/M2 | TEMPERATURE: 98 F | WEIGHT: 184.75 LBS | HEIGHT: 64 IN

## 2019-06-05 DIAGNOSIS — Z09 POSTOP CHECK: Primary | ICD-10-CM

## 2019-06-05 PROCEDURE — 99999 PR PBB SHADOW E&M-EST. PATIENT-LVL III: ICD-10-PCS | Mod: PBBFAC,,, | Performed by: SURGERY

## 2019-06-05 PROCEDURE — 99999 PR PBB SHADOW E&M-EST. PATIENT-LVL III: CPT | Mod: PBBFAC,,, | Performed by: SURGERY

## 2019-06-05 PROCEDURE — 99024 POSTOP FOLLOW-UP VISIT: CPT | Mod: S$GLB,,, | Performed by: SURGERY

## 2019-06-05 PROCEDURE — 99024 PR POST-OP FOLLOW-UP VISIT: ICD-10-PCS | Mod: S$GLB,,, | Performed by: SURGERY

## 2019-06-06 PROBLEM — K64.9 HEMORRHOIDS: Status: RESOLVED | Noted: 2019-05-08 | Resolved: 2019-06-06

## 2019-06-06 NOTE — PROGRESS NOTES
Cc: post op    HPI: 58 y.o.  female  4 weeks s/p hemorrhoidectomy.   Pt notes that she is feeling well.  Pain has improved significantly.  No bleeding or drainage.  Still some swelling noted    PE: AFVSS    AAOx3  CTA  Soft/NT/nd  Hemorrhoidectomy sites with decreasing inflammation. No open wounds.         Path: hemorrhoids    A/P:   Pt doing well post surgery.   F/U with me in 4 weeks

## 2019-07-30 PROBLEM — K91.2 POSTSURGICAL MALABSORPTION: Status: ACTIVE | Noted: 2019-07-30

## 2019-07-30 PROBLEM — F50.81 BINGE EATING DISORDER: Status: ACTIVE | Noted: 2019-07-30

## 2019-07-30 PROBLEM — F50.819 BINGE EATING DISORDER: Status: ACTIVE | Noted: 2019-07-30

## 2019-07-30 PROBLEM — F41.1 GENERALIZED ANXIETY DISORDER: Status: ACTIVE | Noted: 2019-07-30

## 2019-07-30 PROBLEM — I25.10 NONOBSTRUCTIVE ATHEROSCLEROSIS OF CORONARY ARTERY: Status: ACTIVE | Noted: 2019-07-30

## 2019-08-14 ENCOUNTER — OFFICE VISIT (OUTPATIENT)
Dept: SURGERY | Facility: CLINIC | Age: 59
End: 2019-08-14
Payer: COMMERCIAL

## 2019-08-14 VITALS
BODY MASS INDEX: 32.03 KG/M2 | TEMPERATURE: 98 F | HEART RATE: 81 BPM | DIASTOLIC BLOOD PRESSURE: 64 MMHG | SYSTOLIC BLOOD PRESSURE: 118 MMHG | HEIGHT: 63 IN | WEIGHT: 180.75 LBS

## 2019-08-14 DIAGNOSIS — Z09 POSTOP CHECK: Primary | ICD-10-CM

## 2019-08-14 PROCEDURE — 99999 PR PBB SHADOW E&M-EST. PATIENT-LVL III: CPT | Mod: PBBFAC,,, | Performed by: SURGERY

## 2019-08-14 PROCEDURE — 99999 PR PBB SHADOW E&M-EST. PATIENT-LVL III: ICD-10-PCS | Mod: PBBFAC,,, | Performed by: SURGERY

## 2019-08-14 PROCEDURE — 99024 POSTOP FOLLOW-UP VISIT: CPT | Mod: S$GLB,,, | Performed by: SURGERY

## 2019-08-14 PROCEDURE — 99024 PR POST-OP FOLLOW-UP VISIT: ICD-10-PCS | Mod: S$GLB,,, | Performed by: SURGERY

## 2019-08-14 RX ORDER — EMOLLIENT COMBINATION NO.32
EMULSION, EXTENDED RELEASE TOPICAL
Refills: 0 | COMMUNITY
Start: 2019-07-26 | End: 2019-09-09

## 2019-08-14 NOTE — PROGRESS NOTES
Cc: post op    HPI: 58 y.o.  female  3 months s/p hemorrhoidectomy.   Pt presents for f/u.  She notes that she has been feeling well.  No pain or discomfort.  Some occasional urgency.      PE: AFVSS    AAOx3  CTA  Soft/NT/nd  Inc: c/d/i        Path:     FINAL PATHOLOGIC DIAGNOSIS  HEMORRHOIDS    A/P:   Pt doing well post surgery.   F/U with me prn

## 2020-06-03 PROBLEM — M50.30 DDD (DEGENERATIVE DISC DISEASE), CERVICAL: Status: ACTIVE | Noted: 2020-06-03

## 2020-06-05 ENCOUNTER — TELEPHONE (OUTPATIENT)
Dept: NEUROSURGERY | Facility: CLINIC | Age: 60
End: 2020-06-05

## 2020-06-05 ENCOUNTER — OFFICE VISIT (OUTPATIENT)
Dept: SPINE | Facility: CLINIC | Age: 60
End: 2020-06-05
Payer: COMMERCIAL

## 2020-06-05 VITALS
WEIGHT: 194.25 LBS | BODY MASS INDEX: 34.42 KG/M2 | HEART RATE: 81 BPM | TEMPERATURE: 99 F | HEIGHT: 63 IN | SYSTOLIC BLOOD PRESSURE: 143 MMHG | DIASTOLIC BLOOD PRESSURE: 83 MMHG

## 2020-06-05 DIAGNOSIS — M50.30 DDD (DEGENERATIVE DISC DISEASE), CERVICAL: ICD-10-CM

## 2020-06-05 DIAGNOSIS — M54.12 CERVICAL RADICULOPATHY: ICD-10-CM

## 2020-06-05 DIAGNOSIS — M54.2 CERVICALGIA: Primary | ICD-10-CM

## 2020-06-05 DIAGNOSIS — M67.912 DYSFUNCTION OF LEFT ROTATOR CUFF: ICD-10-CM

## 2020-06-05 PROCEDURE — 99999 PR PBB SHADOW E&M-EST. PATIENT-LVL V: ICD-10-PCS | Mod: PBBFAC,,, | Performed by: PHYSICIAN ASSISTANT

## 2020-06-05 PROCEDURE — 99999 PR PBB SHADOW E&M-EST. PATIENT-LVL V: CPT | Mod: PBBFAC,,, | Performed by: PHYSICIAN ASSISTANT

## 2020-06-05 PROCEDURE — 99243 OFF/OP CNSLTJ NEW/EST LOW 30: CPT | Mod: S$GLB,,, | Performed by: PHYSICIAN ASSISTANT

## 2020-06-05 PROCEDURE — 99243 PR OFFICE CONSULTATION,LEVEL III: ICD-10-PCS | Mod: S$GLB,,, | Performed by: PHYSICIAN ASSISTANT

## 2020-06-05 NOTE — PROGRESS NOTES
Back and Spine Consult    Patient ID: Chloe Obrien is a 59 y.o. female.    Chief Complaint   Patient presents with    Shoulder Pain       Review of Systems   Constitutional: Negative for activity change, appetite change, chills, fever and unexpected weight change.   HENT: Negative for tinnitus, trouble swallowing and voice change.    Respiratory: Negative for apnea, cough, chest tightness and shortness of breath.    Cardiovascular: Negative for chest pain and palpitations.   Gastrointestinal: Negative for constipation, diarrhea, nausea and vomiting.   Genitourinary: Negative for difficulty urinating, dysuria, frequency and urgency.   Musculoskeletal: Positive for arthralgias, myalgias and neck pain. Negative for back pain, gait problem and neck stiffness.   Skin: Negative for wound.   Neurological: Positive for numbness. Negative for dizziness, tremors, seizures, facial asymmetry, speech difficulty, weakness, light-headedness and headaches.   Psychiatric/Behavioral: Negative for confusion and decreased concentration.       Past Medical History:   Diagnosis Date    a Mitral valve prolapse     b Hypertension     c Hypercholesterolemia     On Livalo 4 Mg qHS    d Prediabetes     No longer has    e Hypothyroidism     On [place] Thyroid    f Osteoporosis     10/21/18 RXd Fosamax 70 Mg Weekly, And OTC D3 2K IU Daily, And OTC CaCO3 600 Mg Daily    H/O Abnormal Mammogram     F/U Mammograms And U/S Have Been Stable     i 1 PPD X 20+ YRs Tobacco Use Disorder 11/14/2018 11/14/18 I Advocated Smoking Cessation And RXd     i Obstructive Sleep Apnea     not using cpap, lost weight    i Pulmonary Nodules     Dr. Luis F Evans Will Repeat This In 1 Year; 8/7/18 Low Dose Chest CT = 1 Small Nodule In Each Lung    j Diverticulosis Of Sigmoid, Decending, Transverse, and Ascending Colon; W/O Diverticulitis On 9/24/18 EGD     Dr. KIRAN Heck    j Gastroparesis     Dr. KIRAN Heck On 5/2/17 Continued Her  "Post Gastric Sleeve Diet    j GERD With H/O Stricture Dilation     Dr. KIRAN Heck    j H/O Adenomatous Colon Polyps     Dr. KIRAN Heck TC On 9/24/18 Rectal Polyps Only; "Repeat TC In 5 YRs"    j H/O H. Pylori Gastritis 2009     Dr. KIRAN Heck; Received Triple Tx Then    j H/O Recurrent Cyclical Nausea     Dr. KIRAN Heck    j H/O Small Rectal Polyps On 9/24/18 TC; Nonbleeding, Biopsies Negative For Malignancy     Dr. Jonh Heck; "Repeat TC IN 5 YRs"    j Medium Internal Hemorrhoids With Rectal Bleeding On 9/24/18 EGD     Dr. KIRAN Heck; Will Schedule Clinic Visit For Hemorrhoid Banding    j Nonalcoholic Steatohepatitis (N.A.S.H.)     Dr. KIRAN Heck On 8/18/16 Ordered A Liver U/S And LFTs    j Obesity S/P Sleeve Gastrectomy With Hiatal hernia Repair 10/2015     Dr. Scot Werner    j RLQ Pain Due To Epiploic Appendagitis 08/2016     Dr. KIRAN Heck TXd This With Augmentin And Naproxen After Cipro And Flagyl Failed    j RUQ Abdominal Pain 5/31/16     6/3/16 ABD/Pelvic CT W/W/O = 1 CM Right Renal Stone Only; 5/31/16 ABD XRays = Unremarkable; 5/31/16 Increased Her PPI To Bid X 1 Week    k H/O Nephrolithiasis     6/3/16 ABD/Pelvic CT W/W/O = 1 CM Right Renal Stone Only; 5/26/15 ABD/Pelvic CT With IV Contrast Only = Positive For A Nonobstructing Right Renal Stone    l Chronic Recurrent Left Foreleg Pain     n Chronic Insomnia `    On Restoril 30 Mg qHS PRN, Trazodone 100 Mg qHS Caused Dizziness And Headaches    n Depression     12/21/16 Decreased Wellbutrin Further And Increased Zoloft To 100 Mg Daily; 11/8/16 Decreased Wellbutrin-XL To 300 Mg qAM And RXd Zoloft 50 Mg qAM; She Sees A Therapist For This; Lexapro Was Innefective    o Ocular Migraines     q Borderline Vitamin D Deficiency     10/21/18 And 4/4/16 RXd OTC D3 2K Daily    q H/O Abnormal Mammogram     F/U Mammograms And U/S Have Been Stable    Wellness Visit 10/10/2018  "     Social History     Socioeconomic History    Marital status:      Spouse name: Not on file    Number of children: Not on file    Years of education: Not on file    Highest education level: Not on file   Occupational History     Employer: [employer]   Social Needs    Financial resource strain: Somewhat hard    Food insecurity:     Worry: Sometimes true     Inability: Patient refused    Transportation needs:     Medical: Patient refused     Non-medical: No   Tobacco Use    Smoking status: Former Smoker     Packs/day: 1.00     Years: 43.00     Pack years: 43.00    Smokeless tobacco: Never Used    Tobacco comment: quit 11/2018   Substance and Sexual Activity    Alcohol use: No     Alcohol/week: 0.0 standard drinks     Frequency: Never     Drinks per session: Patient refused     Binge frequency: Never    Drug use: No    Sexual activity: Yes   Lifestyle    Physical activity:     Days per week: 4 days     Minutes per session: 50 min    Stress: Not on file   Relationships    Social connections:     Talks on phone: Once a week     Gets together: Once a week     Attends Tenriism service: Not on file     Active member of club or organization: No     Attends meetings of clubs or organizations: Not on file     Relationship status:    Other Topics Concern    Not on file   Social History Narrative    Not on file     Family History   Problem Relation Age of Onset    Cancer Mother         stomach    Heart disease Father     Hypertension Father     Mitral valve prolapse Sister     Hypothyroidism Daughter     Hyperlipidemia Daughter     Diabetes Maternal Grandmother     Schizophrenia Maternal Grandfather     Heart disease Paternal Grandfather     Hypothyroidism Daughter     Hyperlipidemia Daughter     Breast cancer Paternal Aunt 55     Review of patient's allergies indicates:   Allergen Reactions    Keflex [cephalexin] Swelling     Other reaction(s): throat swelilng     Levofloxacin Diarrhea       Current Outpatient Medications:     AIMOVIG AUTOINJECTOR 70 mg/mL injection, INJECT 1 ML EVERY 4 WEEKS SUBCUTANEOUSLY, Disp: , Rfl: 5    alendronate (FOSAMAX) 70 MG tablet, Take 1 tablet (70 mg total) by mouth every 7 days., Disp: 12 tablet, Rfl: 3    ascorbic acid, vitamin C, (VITAMIN C) 1000 MG tablet, Take 2,000 mg by mouth once daily., Disp: , Rfl:     aspirin (ECOTRIN) 81 MG EC tablet, Take 81 mg by mouth once daily., Disp: , Rfl:     azelastine (ASTELIN) 137 mcg (0.1 %) nasal spray, 2 sprays (274 mcg total) by Nasal route as needed. (Patient taking differently: 2 sprays by Nasal route once daily. ), Disp: 90 mL, Rfl: 3    buPROPion (WELLBUTRIN XL) 300 MG 24 hr tablet, Take 1 tablet (300 mg total) by mouth once daily., Disp: 90 tablet, Rfl: 3    cyanocobalamin, vitamin B-12, (VITAMIN B-12 SL), Place under the tongue twice a week., Disp: , Rfl:     desvenlafaxine succinate (PRISTIQ) 50 MG Tb24, Take 1 tablet (50 mg total) by mouth once daily., Disp: 90 tablet, Rfl: 3    dexlansoprazole (DEXILANT) 60 mg capsule, Take 1 capsule (60 mg total) by mouth once daily., Disp: 30 capsule, Rfl: 11    ergocalciferol (VITAMIN D2) 50,000 unit Cap, TAKE ONE CAPSULE BY MOUTH ONE TIME PER WEEK, Disp: 8 capsule, Rfl: 3    hydrOXYzine HCl (ATARAX) 25 MG tablet, Take 1 tablet (25 mg total) by mouth 3 (three) times daily., Disp: 270 tablet, Rfl: 3    iron fum/vit C/ascorbate sod (IRON PLUS VITAMIN C ORAL), Take by mouth., Disp: , Rfl:     montelukast (SINGULAIR) 10 mg tablet, Take 1 tablet (10 mg total) by mouth as needed. (Patient taking differently: Take 10 mg by mouth once daily. ), Disp: 30 tablet, Rfl: 5    multivit with minerals/lutein (MULTIVITAMIN 50 PLUS ORAL), multivitamin  qd, Disp: , Rfl:     naproxen (NAPROSYN) 500 MG tablet, Take 1 tablet (500 mg total) by mouth 2 (two) times daily with meals., Disp: 60 tablet, Rfl: 1    pravastatin (PRAVACHOL) 10 MG tablet, Take 1 tablet (10  "mg total) by mouth once daily., Disp: 90 tablet, Rfl: 1    SYNTHROID 112 mcg tablet, Take 112 mcg by mouth once daily., Disp: , Rfl:     temazepam (RESTORIL) 30 mg capsule, Take 1 capsule (30 mg total) by mouth nightly as needed for Insomnia., Disp: 90 capsule, Rfl: 1    zinc sulfate (ZINC-220 ORAL), Take by mouth., Disp: , Rfl:     albuterol (PROAIR HFA) 90 mcg/actuation inhaler, Inhale 2 puffs into the lungs every 6 (six) hours as needed for Wheezing or Shortness of Breath. Rescue, Disp: 18 g, Rfl: 0    Vitals:    06/05/20 1308   BP: (!) 143/83   BP Location: Right arm   Patient Position: Sitting   BP Method: Medium (Automatic)   Pulse: 81   Temp: 98.9 °F (37.2 °C)   Weight: 88.1 kg (194 lb 3.6 oz)   Height: 5' 3" (1.6 m)       Physical Exam   Constitutional: She is oriented to person, place, and time. She appears well-developed and well-nourished.   HENT:   Head: Normocephalic and atraumatic.   Eyes: Pupils are equal, round, and reactive to light.   Neck: Normal range of motion. Neck supple.   Cardiovascular: Normal rate.   Pulmonary/Chest: Effort normal.   Musculoskeletal: Normal range of motion. She exhibits no edema.   Neurological: She is alert and oriented to person, place, and time. She has a normal Finger-Nose-Finger Test, a normal Heel to Shin Test, a normal Romberg Test and a normal Tandem Gait Test. Gait normal.   Reflex Scores:       Tricep reflexes are 2+ on the right side and 2+ on the left side.       Bicep reflexes are 2+ on the right side and 2+ on the left side.       Brachioradialis reflexes are 2+ on the right side and 2+ on the left side.       Patellar reflexes are 2+ on the right side and 2+ on the left side.       Achilles reflexes are 2+ on the right side and 2+ on the left side.  Skin: Skin is warm, dry and intact.   Psychiatric: She has a normal mood and affect. Her speech is normal and behavior is normal. Judgment and thought content normal.   Nursing note and vitals " reviewed.      Neurologic Exam     Mental Status   Oriented to person, place, and time.   Oriented to person.   Oriented to place.   Oriented to time.   Follows 3 step commands.   Attention: normal. Concentration: normal.   Speech: speech is normal   Level of consciousness: alert  Knowledge: consistent with education.   Able to name object. Able to read. Able to repeat. Able to write. Normal comprehension.     Cranial Nerves     CN II   Visual acuity: normal  Right visual field deficit: none  Left visual field deficit: none     CN III, IV, VI   Pupils are equal, round, and reactive to light.  Right pupil: Size: 3 mm. Shape: regular. Reactivity: brisk. Consensual response: intact.   Left pupil: Size: 3 mm. Shape: regular. Reactivity: brisk. Consensual response: intact.   CN III: no CN III palsy  CN VI: no CN VI palsy  Nystagmus: none   Diplopia: none  Ophthalmoparesis: none  Conjugate gaze: present    CN V   Right facial sensation deficit: none  Left facial sensation deficit: none    CN VII   Right facial weakness: none  Left facial weakness: none    CN VIII   Hearing: intact    CN IX, X   CN IX normal.   CN X normal.     CN XI   Right sternocleidomastoid strength: normal  Left sternocleidomastoid strength: normal  Right trapezius strength: normal  Left trapezius strength: normal    CN XII   Fasciculations: absent  Tongue deviation: none    Motor Exam   Muscle bulk: normal  Overall muscle tone: normal  Right arm pronator drift: absent  Left arm pronator drift: absent    Strength   Right neck flexion: 5/5  Left neck flexion: 5/5  Right neck extension: 5/5  Left neck extension: 5/5  Right deltoid: 5/5  Left deltoid: 5/5  Right biceps: 5/5  Left biceps: 5/5  Right triceps: 5/5  Left triceps: 5/5  Right wrist flexion: 5/5  Left wrist flexion: 5/5  Right wrist extension: 5/5  Left wrist extension: 5/5  Right interossei: 5/5  Left interossei: 5/5  Right abdominals: 5/5  Left abdominals: 5/5  Right iliopsoas: 5/5  Left  iliopsoas: 5/5  Right quadriceps: 5/5  Left quadriceps: 5/5  Right hamstrin/5  Left hamstrin/5  Right glutei: 5/5  Left glutei: 5/5  Right anterior tibial: 5/5  Left anterior tibial: 5/5  Right posterior tibial: 5/5  Left posterior tibial: 5/5  Right peroneal: 5/5  Left peroneal: 5/5  Right gastroc: 5/5  Left gastroc: 5/5    Sensory Exam   Right arm light touch: normal  Left arm light touch: normal  Right leg light touch: normal  Left leg light touch: normal  Right arm vibration: normal  Left arm vibration: normal  Right arm pinprick: normal  Left arm pinprick: normal    Gait, Coordination, and Reflexes     Gait  Gait: normal    Coordination   Romberg: negative  Finger to nose coordination: normal  Heel to shin coordination: normal  Tandem walking coordination: normal    Tremor   Resting tremor: absent  Intention tremor: absent  Action tremor: absent    Reflexes   Right brachioradialis: 2+  Left brachioradialis: 2+  Right biceps: 2+  Left biceps: 2+  Right triceps: 2+  Left triceps: 2+  Right patellar: 2+  Left patellar: 2+  Right achilles: 2+  Left achilles: 2+  Right Ness: absent  Left Ness: absent  Right ankle clonus: absent  Left ankle clonus: absent      Provider dictation:  59 year old right handed female former smoker with MVP, HTN, osteoporosis, GERD, obesity s/p gastric sleeve, depression, and migraines was referred to neurosurgery (Dr. Enriquez) by Dr. Kendrick for evaluation of possible cervical radiculopathy vs rotator cuff pathology.  Presents to [facility] Clinic for initial evaluation.  She had had some chronic neck pain for years.  About 1 month ago she developed pain in the left deltoid region radiating to the left biceps area.  It is associated with intermittent tingling in the left hand digits 3,4.  She was evaluated by orthopedics and is scheduled for MRI left shoulder to rule out rotator cuff disease.  She has tried ice and currently taking naproxen.  She recently started PT and  has not had FRANCISCO.    NDI:  48%.  PHQ:  12.    On exam, she has pain with internal/ external rotation of the left shoulder.  I was not able to detect any neurological deficits on exam.  5/5 strength with 2+ DTR and no sensory deficits.  Gait and station fluid.  Negative hoffmans and babinski.      Xray cervical spine 3-22-19 reviewed.  At C5/6 there is disk space narrowing with 2mm retrolisthesis  And endplate changes.  No evidence of fracture.      Ms. Corona has chronic neck pain with acute onset left rotator cuff disease vs left C5, C6 radiculopathy in setting of C5/6 cervical spondylosis noted on xrays.  Her symptoms are consistent with rotator cuff disease and radiculopathy.  Recommend she proceed with MRI of the shoulder as scheduled and also obtain further dynamic xrays/ MRI of the neck.  Follow up in clinic after imaging is complete to review results and determine which region may be contributing more to her pain at this time.      Visit Diagnosis:  Cervicalgia  -     X-Ray Cervical Spine 5 View W Flex Extxt; Future; Expected date: 06/05/2020  -     MRI Cervical Spine Without Contrast; Future; Expected date: 06/05/2020    DDD (degenerative disc disease), cervical  -     Ambulatory referral/consult to Neurosurgery  -     X-Ray Cervical Spine 5 View W Flex Extxt; Future; Expected date: 06/05/2020  -     MRI Cervical Spine Without Contrast; Future; Expected date: 06/05/2020    Cervical radiculopathy  -     X-Ray Cervical Spine 5 View W Flex Extxt; Future; Expected date: 06/05/2020  -     MRI Cervical Spine Without Contrast; Future; Expected date: 06/05/2020    Dysfunction of left rotator cuff        Total time spent counseling greater than fifty percent of total visit time.  Counseling included discussion regarding imaging findings, diagnosis possibilities, treatment options, risks and benefits.   The patient had many questions regarding the options and long-term effects.

## 2020-06-05 NOTE — LETTER
Traci 15, 2020      [place]  [address] [address] [facility]  [facility] [address]           [facility]  [address] [place] [address]  Phone: [phone]  Fax: [phone]          Patient: Chloe Obrien   MR Number: [id number]   YOB: 1960   Date of Visit: 6/5/2020       Dear Dr. Clifford Kendrick II:    Thank you for referring Chloe Obrien to me for evaluation. Attached you will find relevant portions of my assessment and plan of care.    If you have questions, please do not hesitate to call me. I look forward to following Chloe Obrien along with you.    Sincerely,    ISAAC Nicoleosure  CC:  No Recipients    If you would like to receive this communication electronically, please contact [email or web address] or [phone] to request more information on [employer] Link access.    For providers and/or their staff who would like to refer a patient to [facility], please contact us through our one-stop-shop provider referral line, [facility], at [phone].    If you feel you have received this communication in error or would no longer like to receive these types of communications, please e-mail [email or web address]

## 2020-06-09 ENCOUNTER — TELEPHONE (OUTPATIENT)
Dept: NEUROSURGERY | Facility: CLINIC | Age: 60
End: 2020-06-09

## 2020-06-09 NOTE — TELEPHONE ENCOUNTER
----- Message from Cassie Smalls sent at 6/9/2020  9:42 AM CDT -----  Contact: self   Patient think she miss call from office if so please call back at [phone] (home)     Case number [id number]

## 2020-06-09 NOTE — TELEPHONE ENCOUNTER
----- Message from Veronica Gregg sent at 6/9/2020 10:02 AM CDT -----  Contact: pt [phone]  Call placed to pod.  Patient is returning a call back to the office she just missed   Call back  [phone]

## 2020-06-09 NOTE — TELEPHONE ENCOUNTER
Spoke with patient and let her know she is scheduled for x-ray and MRI 6-16-20, she indicated understanding.

## 2020-06-09 NOTE — TELEPHONE ENCOUNTER
Spoke with patient and let her know I will call her tomorrow with a f/u appointment, she indicated understanding.

## 2020-06-09 NOTE — TELEPHONE ENCOUNTER
Kourtney-This patient is scheduled for x-ray and MRI 6-16-20 and needs a follow-up with someone for results.

## 2020-06-11 ENCOUNTER — TELEPHONE (OUTPATIENT)
Dept: NEUROSURGERY | Facility: CLINIC | Age: 60
End: 2020-06-11

## 2020-06-11 NOTE — TELEPHONE ENCOUNTER
This is a  patient that I was going to ask if you can call with her MRI cervical results next week. She wants to know if we can give her valium to take since she's getting a cervical and shoulder MRI at the same time?

## 2020-06-16 ENCOUNTER — HOSPITAL ENCOUNTER (OUTPATIENT)
Dept: RADIOLOGY | Facility: HOSPITAL | Age: 60
Discharge: HOME | End: 2020-06-16
Attending: PHYSICIAN ASSISTANT
Payer: COMMERCIAL

## 2020-06-16 ENCOUNTER — HOSPITAL ENCOUNTER (OUTPATIENT)
Dept: RADIOLOGY | Facility: HOSPITAL | Age: 60
Discharge: HOME | End: 2020-06-16
Attending: ORTHOPAEDIC SURGERY
Payer: COMMERCIAL

## 2020-06-16 DIAGNOSIS — M54.2 CERVICALGIA: ICD-10-CM

## 2020-06-16 DIAGNOSIS — M50.30 DDD (DEGENERATIVE DISC DISEASE), CERVICAL: ICD-10-CM

## 2020-06-16 DIAGNOSIS — M54.12 CERVICAL RADICULOPATHY: ICD-10-CM

## 2020-06-16 DIAGNOSIS — M75.102 ROTATOR CUFF SYNDROME, LEFT: ICD-10-CM

## 2020-06-16 PROCEDURE — 72141 MRI NECK SPINE W/O DYE: CPT | Mod: 26,,, | Performed by: RADIOLOGY

## 2020-06-16 PROCEDURE — 72141 MRI CERVICAL SPINE WITHOUT CONTRAST: ICD-10-PCS | Mod: 26,,, | Performed by: RADIOLOGY

## 2020-06-16 PROCEDURE — 72052 XR CERVICAL SPINE 5 VIEW WITH FLEX AND EXT: ICD-10-PCS | Mod: 26,,, | Performed by: RADIOLOGY

## 2020-06-16 PROCEDURE — 72141 MRI NECK SPINE W/O DYE: CPT | Mod: TC,PO

## 2020-06-16 PROCEDURE — 73221 MRI JOINT UPR EXTREM W/O DYE: CPT | Mod: TC,PO,LT

## 2020-06-16 PROCEDURE — 73221 MRI JOINT UPR EXTREM W/O DYE: CPT | Mod: 26,LT,, | Performed by: RADIOLOGY

## 2020-06-16 PROCEDURE — 72052 X-RAY EXAM NECK SPINE 6/>VWS: CPT | Mod: 26,,, | Performed by: RADIOLOGY

## 2020-06-16 PROCEDURE — 72052 X-RAY EXAM NECK SPINE 6/>VWS: CPT | Mod: TC,FY,PO

## 2020-06-16 PROCEDURE — 73221 MRI SHOULDER WITHOUT CONTRAST LEFT: ICD-10-PCS | Mod: 26,LT,, | Performed by: RADIOLOGY

## 2020-06-22 ENCOUNTER — TELEPHONE (OUTPATIENT)
Dept: SPINE | Facility: CLINIC | Age: 60
End: 2020-06-22

## 2020-06-22 NOTE — TELEPHONE ENCOUNTER
Called patient to schedule her an appointment to follow-up in the Back and Spine clinic, got voicemail, left return call message.

## 2020-06-22 NOTE — TELEPHONE ENCOUNTER
----- Message from Christi Ramesh PA-C sent at 6/19/2020 10:56 AM CDT -----  Regarding: follow up appt  Hi Candelaria Toro wanted this patient to follow up after MRI to review after she did PT. Can you please make her a follow up in a few weeks after she has tried some PT. Overall her imaging is negative.     Jasmyn

## 2020-06-24 ENCOUNTER — TELEPHONE (OUTPATIENT)
Dept: SPINE | Facility: CLINIC | Age: 60
End: 2020-06-24

## 2020-06-24 NOTE — TELEPHONE ENCOUNTER
Called patient to schedule a follow-up appointment in the [facility] Spine Clinic, got voicemail, left return call message.

## 2020-06-25 ENCOUNTER — TELEPHONE (OUTPATIENT)
Dept: SPINE | Facility: CLINIC | Age: 60
End: 2020-06-25

## 2020-06-25 NOTE — TELEPHONE ENCOUNTER
Spoke with patient and scheduled her for a follow-up appointment with Christi Ramesh 7-6-20, she indicated understanding.

## 2020-06-29 PROBLEM — S46.812D PARTIAL TEAR OF SUBSCAPULARIS TENDON, LEFT, SUBSEQUENT ENCOUNTER: Status: ACTIVE | Noted: 2020-06-29

## 2020-06-29 PROBLEM — M25.512 CHRONIC LEFT SHOULDER PAIN: Status: ACTIVE | Noted: 2020-06-29

## 2020-06-29 PROBLEM — G89.29 CHRONIC LEFT SHOULDER PAIN: Status: ACTIVE | Noted: 2020-06-29

## 2020-07-06 ENCOUNTER — OFFICE VISIT (OUTPATIENT)
Dept: SPINE | Facility: CLINIC | Age: 60
End: 2020-07-06
Payer: COMMERCIAL

## 2020-07-06 VITALS
BODY MASS INDEX: 34.42 KG/M2 | WEIGHT: 194.25 LBS | SYSTOLIC BLOOD PRESSURE: 156 MMHG | HEART RATE: 63 BPM | HEIGHT: 63 IN | DIASTOLIC BLOOD PRESSURE: 85 MMHG | TEMPERATURE: 98 F

## 2020-07-06 DIAGNOSIS — M75.42 IMPINGEMENT SYNDROME OF LEFT SHOULDER: Primary | ICD-10-CM

## 2020-07-06 DIAGNOSIS — M75.41 INTERNAL IMPINGEMENT OF RIGHT SHOULDER: ICD-10-CM

## 2020-07-06 DIAGNOSIS — M50.30 DDD (DEGENERATIVE DISC DISEASE), CERVICAL: ICD-10-CM

## 2020-07-06 PROCEDURE — 99214 OFFICE O/P EST MOD 30 MIN: CPT | Mod: S$GLB,,, | Performed by: PHYSICIAN ASSISTANT

## 2020-07-06 PROCEDURE — 3077F PR MOST RECENT SYSTOLIC BLOOD PRESSURE >= 140 MM HG: ICD-10-PCS | Mod: CPTII,S$GLB,, | Performed by: PHYSICIAN ASSISTANT

## 2020-07-06 PROCEDURE — 99999 PR PBB SHADOW E&M-EST. PATIENT-LVL IV: CPT | Mod: PBBFAC,,, | Performed by: PHYSICIAN ASSISTANT

## 2020-07-06 PROCEDURE — 99214 PR OFFICE/OUTPT VISIT, EST, LEVL IV, 30-39 MIN: ICD-10-PCS | Mod: S$GLB,,, | Performed by: PHYSICIAN ASSISTANT

## 2020-07-06 PROCEDURE — 99999 PR PBB SHADOW E&M-EST. PATIENT-LVL IV: ICD-10-PCS | Mod: PBBFAC,,, | Performed by: PHYSICIAN ASSISTANT

## 2020-07-06 PROCEDURE — 3079F DIAST BP 80-89 MM HG: CPT | Mod: CPTII,S$GLB,, | Performed by: PHYSICIAN ASSISTANT

## 2020-07-06 PROCEDURE — 3008F PR BODY MASS INDEX (BMI) DOCUMENTED: ICD-10-PCS | Mod: CPTII,S$GLB,, | Performed by: PHYSICIAN ASSISTANT

## 2020-07-06 PROCEDURE — 3079F PR MOST RECENT DIASTOLIC BLOOD PRESSURE 80-89 MM HG: ICD-10-PCS | Mod: CPTII,S$GLB,, | Performed by: PHYSICIAN ASSISTANT

## 2020-07-06 PROCEDURE — 3077F SYST BP >= 140 MM HG: CPT | Mod: CPTII,S$GLB,, | Performed by: PHYSICIAN ASSISTANT

## 2020-07-06 PROCEDURE — 3008F BODY MASS INDEX DOCD: CPT | Mod: CPTII,S$GLB,, | Performed by: PHYSICIAN ASSISTANT

## 2020-07-07 ENCOUNTER — OFFICE VISIT (OUTPATIENT)
Dept: SPINE | Facility: CLINIC | Age: 60
End: 2020-07-07
Payer: COMMERCIAL

## 2020-07-07 VITALS
HEART RATE: 61 BPM | WEIGHT: 194.25 LBS | BODY MASS INDEX: 34.42 KG/M2 | DIASTOLIC BLOOD PRESSURE: 87 MMHG | HEIGHT: 63 IN | SYSTOLIC BLOOD PRESSURE: 154 MMHG | TEMPERATURE: 99 F

## 2020-07-07 DIAGNOSIS — M54.12 RADICULOPATHY OF CERVICAL REGION: ICD-10-CM

## 2020-07-07 DIAGNOSIS — M75.41 IMPINGEMENT SYNDROME OF RIGHT SHOULDER REGION: ICD-10-CM

## 2020-07-07 DIAGNOSIS — M75.42 SHOULDER IMPINGEMENT SYNDROME, LEFT: Primary | ICD-10-CM

## 2020-07-07 PROCEDURE — 99211 PR OFFICE/OUTPT VISIT, EST, LEVL I: ICD-10-PCS | Mod: 25,S$GLB,, | Performed by: PHYSICIAN ASSISTANT

## 2020-07-07 PROCEDURE — 20610 DRAIN/INJ JOINT/BURSA W/O US: CPT | Mod: 50,S$GLB,, | Performed by: PHYSICIAN ASSISTANT

## 2020-07-07 PROCEDURE — 3077F PR MOST RECENT SYSTOLIC BLOOD PRESSURE >= 140 MM HG: ICD-10-PCS | Mod: CPTII,S$GLB,, | Performed by: PHYSICIAN ASSISTANT

## 2020-07-07 PROCEDURE — 3079F DIAST BP 80-89 MM HG: CPT | Mod: CPTII,S$GLB,, | Performed by: PHYSICIAN ASSISTANT

## 2020-07-07 PROCEDURE — 3077F SYST BP >= 140 MM HG: CPT | Mod: CPTII,S$GLB,, | Performed by: PHYSICIAN ASSISTANT

## 2020-07-07 PROCEDURE — 99211 OFF/OP EST MAY X REQ PHY/QHP: CPT | Mod: 25,S$GLB,, | Performed by: PHYSICIAN ASSISTANT

## 2020-07-07 PROCEDURE — 20610 PR DRAIN/INJECT LARGE JOINT/BURSA: ICD-10-PCS | Mod: 50,S$GLB,, | Performed by: PHYSICIAN ASSISTANT

## 2020-07-07 PROCEDURE — 3079F PR MOST RECENT DIASTOLIC BLOOD PRESSURE 80-89 MM HG: ICD-10-PCS | Mod: CPTII,S$GLB,, | Performed by: PHYSICIAN ASSISTANT

## 2020-07-07 PROCEDURE — 99999 PR PBB SHADOW E&M-EST. PATIENT-LVL V: CPT | Mod: PBBFAC,,, | Performed by: PHYSICIAN ASSISTANT

## 2020-07-07 PROCEDURE — 99999 PR PBB SHADOW E&M-EST. PATIENT-LVL V: ICD-10-PCS | Mod: PBBFAC,,, | Performed by: PHYSICIAN ASSISTANT

## 2020-07-07 RX ORDER — TRIAMCINOLONE ACETONIDE 40 MG/ML
80 INJECTION, SUSPENSION INTRA-ARTICULAR; INTRAMUSCULAR
Status: COMPLETED | OUTPATIENT
Start: 2020-07-07 | End: 2020-07-07

## 2020-07-07 RX ORDER — TRIAMCINOLONE ACETONIDE 40 MG/ML
80 INJECTION, SUSPENSION INTRA-ARTICULAR; INTRAMUSCULAR ONCE
Status: COMPLETED | OUTPATIENT
Start: 2020-07-07 | End: 2020-07-07

## 2020-07-07 RX ADMIN — TRIAMCINOLONE ACETONIDE 80 MG: 40 INJECTION, SUSPENSION INTRA-ARTICULAR; INTRAMUSCULAR at 08:07

## 2020-07-07 NOTE — PROGRESS NOTES
Procedure only Visit     PROCEDURE NOTE:  I have explained the risks, benefits, and alternatives of the procedure in detail.  The patient voices understanding and all questions have been answered.  The patient agrees to proceed as planned. After a sterile prep of the skin in the normal the bilateral shoulder is injected from the posterior approach using a 22 gauge needle with a combination of 8cc 1% lidocaine and 80 mg of kenalog. The patient is cautioned and immediate relief of pain is secondary to the local anesthetic and will be temporary.  After the anesthetic wears off there may be a increase in pain that may last for a few hours or a few days and they should use ice to help alleviate this flair up of pain.       Patient has some relief of shoulder pain with improved ROM. We will recheck in 1 week. If no significant improvement, plan to send to Pain Mgmt for FRANCISCO

## 2020-07-07 NOTE — PROGRESS NOTES
Back and Spine Consult    Patient ID: Chloe Obrien is a 59 y.o. female.    Chief Complaint   Patient presents with    Follow-up     6 wk f/u cervicalgia       Review of Systems   Constitutional: Negative for activity change, appetite change, chills, fever and unexpected weight change.   HENT: Negative for tinnitus, trouble swallowing and voice change.    Respiratory: Negative for apnea, cough, chest tightness and shortness of breath.    Cardiovascular: Negative for chest pain and palpitations.   Gastrointestinal: Negative for constipation, diarrhea, nausea and vomiting.   Genitourinary: Negative for difficulty urinating, dysuria, frequency and urgency.   Musculoskeletal: Positive for arthralgias, myalgias and neck pain. Negative for back pain, gait problem and neck stiffness.   Skin: Negative for wound.   Neurological: Positive for numbness. Negative for dizziness, tremors, seizures, facial asymmetry, speech difficulty, weakness, light-headedness and headaches.   Psychiatric/Behavioral: Negative for confusion and decreased concentration.       Past Medical History:   Diagnosis Date    a Mitral valve prolapse     b Hypertension     c Hypercholesterolemia     On Livalo 4 Mg qHS    d Prediabetes     No longer has    e Hypothyroidism     On Milfay Thyroid    f Osteoporosis     10/21/18 RXd Fosamax 70 Mg Weekly, And OTC D3 2K IU Daily, And OTC CaCO3 600 Mg Daily    H/O Abnormal Mammogram     F/U Mammograms And U/S Have Been Stable     i 1 PPD X 20+ YRs Tobacco Use Disorder 11/14/2018 11/14/18 I Advocated Smoking Cessation And RXd     i Obstructive Sleep Apnea     not using cpap, lost weight    i Pulmonary Nodules     Dr. Luis F Evans Will Repeat This In 1 Year; 8/7/18 Low Dose Chest CT = 1 Small Nodule In Each Lung    j Diverticulosis Of Sigmoid, Decending, Transverse, and Ascending Colon; W/O Diverticulitis On 9/24/18 EGD     Dr. KIRAN Heck    j Gastroparesis     Dr. KIRAN Heck On  "5/2/17 Continued Her Post Gastric Sleeve Diet    j GERD With H/O Stricture Dilation     Dr. KIRAN Heck    j H/O Adenomatous Colon Polyps     Dr. KIRAN Heck TC On 9/24/18 Rectal Polyps Only; "Repeat TC In 5 YRs"    j H/O H. Pylori Gastritis 2009     Dr. KIRAN Heck; Received Triple Tx Then    j H/O Recurrent Cyclical Nausea     Dr. KIRAN Heck    j H/O Small Rectal Polyps On 9/24/18 TC; Nonbleeding, Biopsies Negative For Malignancy     Dr. Jonh Heck; "Repeat TC IN 5 YRs"    j Medium Internal Hemorrhoids With Rectal Bleeding On 9/24/18 EGD     Dr. KIRAN Heck; Will Schedule Clinic Visit For Hemorrhoid Banding    j Nonalcoholic Steatohepatitis (N.A.S.H.)     Dr. KIRAN Heck On 8/18/16 Ordered A Liver U/S And LFTs    j Obesity S/P Sleeve Gastrectomy With Hiatal hernia Repair 10/2015     Dr. Scot Werner    j RLQ Pain Due To Epiploic Appendagitis 08/2016     Dr. KIRAN Heck TXd This With Augmentin And Naproxen After Cipro And Flagyl Failed    j RUQ Abdominal Pain 5/31/16     6/3/16 ABD/Pelvic CT W/W/O = 1 CM Right Renal Stone Only; 5/31/16 ABD XRays = Unremarkable; 5/31/16 Increased Her PPI To Bid X 1 Week    k H/O Nephrolithiasis     6/3/16 ABD/Pelvic CT W/W/O = 1 CM Right Renal Stone Only; 5/26/15 ABD/Pelvic CT With IV Contrast Only = Positive For A Nonobstructing Right Renal Stone    l Chronic Recurrent Left Foreleg Pain     n Chronic Insomnia `    On Restoril 30 Mg qHS PRN, Trazodone 100 Mg qHS Caused Dizziness And Headaches    n Depression     12/21/16 Decreased Wellbutrin Further And Increased Zoloft To 100 Mg Daily; 11/8/16 Decreased Wellbutrin-XL To 300 Mg qAM And RXd Zoloft 50 Mg qAM; She Sees A Therapist For This; Lexapro Was Innefective    o Ocular Migraines     q Borderline Vitamin D Deficiency     10/21/18 And 4/4/16 RXd OTC D3 2K Daily    q H/O Abnormal Mammogram     F/U Mammograms And U/S Have Been Stable    Wellness Visit " 10/10/2018      Social History     Socioeconomic History    Marital status:      Spouse name: Not on file    Number of children: Not on file    Years of education: Not on file    Highest education level: Not on file   Occupational History     Employer: [employer]   Social Needs    Financial resource strain: Somewhat hard    Food insecurity     Worry: Sometimes true     Inability: Patient refused    Transportation needs     Medical: Patient refused     Non-medical: No   Tobacco Use    Smoking status: Former Smoker     Packs/day: 1.00     Years: 43.00     Pack years: 43.00    Smokeless tobacco: Never Used    Tobacco comment: quit 11/2018   Substance and Sexual Activity    Alcohol use: No     Alcohol/week: 0.0 standard drinks     Frequency: Never     Drinks per session: Patient refused     Binge frequency: Never    Drug use: No    Sexual activity: Yes   Lifestyle    Physical activity     Days per week: 4 days     Minutes per session: 50 min    Stress: Not on file   Relationships    Social connections     Talks on phone: Once a week     Gets together: Once a week     Attends Bahai service: Not on file     Active member of club or organization: No     Attends meetings of clubs or organizations: Not on file     Relationship status:    Other Topics Concern    Not on file   Social History Narrative    Not on file     Family History   Problem Relation Age of Onset    Cancer Mother         stomach    Heart disease Father     Hypertension Father     Mitral valve prolapse Sister     Hypothyroidism Daughter     Hyperlipidemia Daughter     Diabetes Maternal Grandmother     Schizophrenia Maternal Grandfather     Heart disease Paternal Grandfather     Hypothyroidism Daughter     Hyperlipidemia Daughter     Breast cancer Paternal Aunt 55     Review of patient's allergies indicates:   Allergen Reactions    Keflex [cephalexin] Swelling     Other reaction(s): throat  brielilng    Levofloxacin Diarrhea       Current Outpatient Medications:     AIMOVIG AUTOINJECTOR 70 mg/mL injection, INJECT 1 ML EVERY 4 WEEKS SUBCUTANEOUSLY, Disp: , Rfl: 5    alendronate (FOSAMAX) 70 MG tablet, Take 1 tablet (70 mg total) by mouth every 7 days., Disp: 12 tablet, Rfl: 3    ascorbic acid, vitamin C, (VITAMIN C) 1000 MG tablet, Take 2,000 mg by mouth once daily., Disp: , Rfl:     aspirin (ECOTRIN) 81 MG EC tablet, Take 81 mg by mouth once daily., Disp: , Rfl:     azelastine (ASTELIN) 137 mcg (0.1 %) nasal spray, 2 sprays (274 mcg total) by Nasal route as needed. (Patient taking differently: 2 sprays by Nasal route once daily. ), Disp: 90 mL, Rfl: 3    cyanocobalamin, vitamin B-12, (VITAMIN B-12 SL), Place under the tongue twice a week., Disp: , Rfl:     desvenlafaxine succinate (PRISTIQ) 50 MG Tb24, Take 1 tablet (50 mg total) by mouth once daily., Disp: 90 tablet, Rfl: 3    dexlansoprazole (DEXILANT) 60 mg capsule, Take 1 capsule (60 mg total) by mouth once daily., Disp: 30 capsule, Rfl: 11    ergocalciferol (ERGOCALCIFEROL) 50,000 unit Cap, TAKE ONE CAPSULE BY MOUTH ONE TIME PER WEEK, Disp: 12 capsule, Rfl: 2    estradioL (ESTRACE) 0.5 MG tablet, , Disp: , Rfl:     hydrOXYzine HCl (ATARAX) 25 MG tablet, Take 1 tablet (25 mg total) by mouth 3 (three) times daily., Disp: 270 tablet, Rfl: 3    iron fum/vit C/ascorbate sod (IRON PLUS VITAMIN C ORAL), Take by mouth., Disp: , Rfl:     montelukast (SINGULAIR) 10 mg tablet, Take 1 tablet (10 mg total) by mouth as needed. (Patient taking differently: Take 10 mg by mouth once daily. ), Disp: 30 tablet, Rfl: 5    multivit with minerals/lutein (MULTIVITAMIN 50 PLUS ORAL), multivitamin  qd, Disp: , Rfl:     naproxen (NAPROSYN) 500 MG tablet, Take 1 tablet (500 mg total) by mouth 2 (two) times daily with meals., Disp: 60 tablet, Rfl: 1    pravastatin (PRAVACHOL) 10 MG tablet, Take 1 tablet (10 mg total) by mouth once daily., Disp: 90 tablet,  "Rfl: 1    SYNTHROID 112 mcg tablet, Take 112 mcg by mouth once daily., Disp: , Rfl:     temazepam (RESTORIL) 30 mg capsule, Take 1 capsule (30 mg total) by mouth nightly as needed for Insomnia., Disp: 90 capsule, Rfl: 1    zinc sulfate (ZINC-220 ORAL), Take by mouth., Disp: , Rfl:     albuterol (PROAIR HFA) 90 mcg/actuation inhaler, Inhale 2 puffs into the lungs every 6 (six) hours as needed for Wheezing or Shortness of Breath. Rescue, Disp: 18 g, Rfl: 0    ALPRAZolam (XANAX) 0.5 MG tablet, Take 1 tablet (0.5 mg total) by mouth once as needed (30 min prior to MRI)., Disp: 2 tablet, Rfl: 0    buPROPion (WELLBUTRIN XL) 300 MG 24 hr tablet, Take 1 tablet (300 mg total) by mouth once daily., Disp: 90 tablet, Rfl: 3    Vitals:    07/06/20 1033   BP: (!) 156/85   BP Location: Left arm   Patient Position: Sitting   BP Method: Medium (Automatic)   Pulse: 63   Temp: 98 °F (36.7 °C)   Weight: 88.1 kg (194 lb 3.6 oz)   Height: 5' 3" (1.6 m)       Physical Exam  Vitals signs and nursing note reviewed.   Constitutional:       Appearance: She is well-developed.   HENT:      Head: Normocephalic and atraumatic.   Eyes:      Pupils: Pupils are equal, round, and reactive to light.   Neck:      Musculoskeletal: Normal range of motion and neck supple.   Cardiovascular:      Rate and Rhythm: Normal rate.   Pulmonary:      Effort: Pulmonary effort is normal.   Musculoskeletal: Normal range of motion.   Skin:     General: Skin is warm and dry.   Neurological:      Mental Status: She is alert and oriented to person, place, and time.      Coordination: Finger-Nose-Finger Test, Heel to Shin Test and Romberg Test normal.      Gait: Gait is intact. Tandem walk normal.      Deep Tendon Reflexes:      Reflex Scores:       Tricep reflexes are 2+ on the right side and 2+ on the left side.       Bicep reflexes are 2+ on the right side and 2+ on the left side.       Brachioradialis reflexes are 2+ on the right side and 2+ on the left side.     "   Patellar reflexes are 2+ on the right side and 2+ on the left side.       Achilles reflexes are 2+ on the right side and 2+ on the left side.  Psychiatric:         Speech: Speech normal.         Behavior: Behavior normal.         Thought Content: Thought content normal.         Judgment: Judgment normal.         Neurologic Exam     Mental Status   Oriented to person, place, and time.   Oriented to person.   Oriented to place.   Oriented to time.   Follows 3 step commands.   Attention: normal. Concentration: normal.   Speech: speech is normal   Level of consciousness: alert  Knowledge: consistent with education.   Able to name object. Able to read. Able to repeat. Able to write. Normal comprehension.     Cranial Nerves     CN II   Visual acuity: normal  Right visual field deficit: none  Left visual field deficit: none     CN III, IV, VI   Pupils are equal, round, and reactive to light.  Right pupil: Size: 3 mm. Shape: regular. Reactivity: brisk. Consensual response: intact.   Left pupil: Size: 3 mm. Shape: regular. Reactivity: brisk. Consensual response: intact.   CN III: no CN III palsy  CN VI: no CN VI palsy  Nystagmus: none   Diplopia: none  Ophthalmoparesis: none  Conjugate gaze: present    CN V   Right facial sensation deficit: none  Left facial sensation deficit: none    CN VII   Right facial weakness: none  Left facial weakness: none    CN VIII   Hearing: intact    CN IX, X   CN IX normal.   CN X normal.     CN XI   Right sternocleidomastoid strength: normal  Left sternocleidomastoid strength: normal  Right trapezius strength: normal  Left trapezius strength: normal    CN XII   Fasciculations: absent  Tongue deviation: none    Motor Exam   Muscle bulk: normal  Overall muscle tone: normal  Right arm pronator drift: absent  Left arm pronator drift: absent    Strength   Right neck flexion: 5/5  Left neck flexion: 5/5  Right neck extension: 5/5  Left neck extension: 5/5  Right deltoid: 5/5  Left deltoid:  5/5  Right biceps: 5/5  Left biceps: 5/  Right triceps: 5/5  Left triceps: 5/5  Right wrist flexion: 5/5  Left wrist flexion: 5/5  Right wrist extension: 5/  Left wrist extension:   Right interossei: 5/  Left interossei: 5/  Right abdominals: 5/  Left abdominals: 5/  Right iliopsoas:   Left iliopsoas:   Right quadriceps:   Left quadriceps:   Right hamstrin/5  Left hamstrin/5  Right glutei:   Left glutei:   Right anterior tibial:   Left anterior tibial:   Right posterior tibial:   Left posterior tibial:   Right peroneal:   Left peroneal:   Right gastroc:   Left gastroc:     Sensory Exam   Right arm light touch: normal  Left arm light touch: normal  Right leg light touch: normal  Left leg light touch: normal  Right arm vibration: normal  Left arm vibration: normal  Right arm pinprick: normal  Left arm pinprick: normal    Gait, Coordination, and Reflexes     Gait  Gait: normal    Coordination   Romberg: negative  Finger to nose coordination: normal  Heel to shin coordination: normal  Tandem walking coordination: normal    Tremor   Resting tremor: absent  Intention tremor: absent  Action tremor: absent    Reflexes   Right brachioradialis: 2+  Left brachioradialis: 2+  Right biceps: 2+  Left biceps: 2+  Right triceps: 2+  Left triceps: 2+  Right patellar: 2+  Left patellar: 2+  Right achilles: 2+  Left achilles: 2+  Right Ness: absent  Left Ness: absent  Right ankle clonus: absent  Left ankle clonus: absent      Provider dictation:  59 year old right handed female former smoker with MVP, HTN, osteoporosis, GERD, obesity s/p gastric sleeve, depression, and migraines was referred to neurosurgery (Dr. Enriquez) by Dr. Kendrick for evaluation of possible cervical radiculopathy vs rotator cuff pathology.  Presents to [facility] for follow-up evaluation.  She has been in physical therapy with some significant improvement in shoulder range of motion  however she still is unable to lift her shoulder past 90°.  She has not received shoulder injections in the past.  It was her right arm now it seems to be her left arm.  She has no radiation from the cervical spine.  She has no numbness or tingling in the upper extremities.  Pain is localized to the superior portion of the shoulder and the insertion of the deltoid.    NDI:  48%.  PHQ:  12.    On exam, she has pain with internal/ external rotation of the left shoulder.  She has a positive left off.  Mild impingement sign.  Deltoid strength is full however above 90° she has significant weakness with abduction.  I was not able to detect any neurological deficits on exam.  5/5 strength with 2+ DTR and no sensory deficits.  Gait and station fluid.  Negative hoffmans and babinski.      MRI of the cervical spine dated 06/16/2020 independently reviewed.  There is multilevel degenerative disc disease.  At C5-6 there is a right paracentral disc herniation causing moderate foraminal stenosis in contact with the C6 nerve root however there is no significant pathology on the left.    I have reviewed the patient's imaging loss she does have C5-C6 pathology is mostly on the right and today her symptoms are mostly on the left.  We discussed diagnostic therapeutic injections both in the shoulder as well as the cervical spine.  She is unable to get into the Dr. Kendrick for a few weeks.  Given time constraints today I'm able to do a shoulder injection however happy to give a subacromial shoulder injection for diagnostic therapeutic purposes tomorrow.  She is more willing to proceed with a shoulder injection rather than an epidural steroid injection in the cervical spine.  We will try a left shoulder injection to see if this alleviates her symptoms.  If it does not we will refer her for epidural steroid injection.      Visit Diagnosis:  Impingement syndrome of left shoulder    Internal impingement of right shoulder    DDD  (degenerative disc disease), cervical        Total time spent counseling greater than fifty percent of total visit time.  Counseling included discussion regarding imaging findings, diagnosis possibilities, treatment options, risks and benefits.   The patient had many questions regarding the options and long-term effects.

## 2020-07-16 ENCOUNTER — TELEPHONE (OUTPATIENT)
Dept: SPINE | Facility: CLINIC | Age: 60
End: 2020-07-16

## 2020-07-30 PROBLEM — M54.2 CERVICALGIA: Status: RESOLVED | Noted: 2019-04-02 | Resolved: 2020-07-30

## 2020-07-30 PROBLEM — M25.511 RIGHT SHOULDER PAIN: Status: RESOLVED | Noted: 2019-04-02 | Resolved: 2020-07-30

## 2020-07-30 PROBLEM — R20.0 NUMBNESS AND TINGLING OF BOTH UPPER EXTREMITIES: Status: RESOLVED | Noted: 2019-04-02 | Resolved: 2020-07-30

## 2020-07-30 PROBLEM — R20.2 NUMBNESS AND TINGLING OF BOTH UPPER EXTREMITIES: Status: RESOLVED | Noted: 2019-04-02 | Resolved: 2020-07-30

## 2020-08-10 PROBLEM — M75.102 ROTATOR CUFF SYNDROME, LEFT: Status: ACTIVE | Noted: 2020-08-10

## 2020-09-02 PROBLEM — S46.812A PARTIAL TEAR OF LEFT SUBSCAPULARIS TENDON: Status: ACTIVE | Noted: 2020-09-02

## 2020-09-02 PROBLEM — M75.112 NONTRAUMATIC INCOMPLETE TEAR OF LEFT ROTATOR CUFF: Status: ACTIVE | Noted: 2020-09-02

## 2020-09-02 PROBLEM — M66.322 NONTRAUMATIC RUPTURE OF LEFT LONG HEAD BICEPS TENDON: Status: ACTIVE | Noted: 2020-09-02

## 2020-12-15 PROBLEM — Z98.890 S/P ROTATOR CUFF SURGERY: Status: ACTIVE | Noted: 2020-12-15

## 2021-03-06 ENCOUNTER — IMMUNIZATION (OUTPATIENT)
Dept: FAMILY MEDICINE | Facility: CLINIC | Age: 61
End: 2021-03-06
Payer: COMMERCIAL

## 2021-03-06 DIAGNOSIS — Z23 NEED FOR VACCINATION: Primary | ICD-10-CM

## 2021-03-06 PROCEDURE — 91300 COVID-19, MRNA, LNP-S, PF, 30 MCG/0.3 ML DOSE VACCINE: CPT | Mod: PBBFAC | Performed by: FAMILY MEDICINE

## 2021-03-27 ENCOUNTER — IMMUNIZATION (OUTPATIENT)
Dept: FAMILY MEDICINE | Facility: CLINIC | Age: 61
End: 2021-03-27
Payer: COMMERCIAL

## 2021-03-27 DIAGNOSIS — Z23 NEED FOR VACCINATION: Primary | ICD-10-CM

## 2021-03-27 PROCEDURE — 0002A COVID-19, MRNA, LNP-S, PF, 30 MCG/0.3 ML DOSE VACCINE: CPT | Mod: PBBFAC | Performed by: FAMILY MEDICINE

## 2021-03-27 PROCEDURE — 91300 COVID-19, MRNA, LNP-S, PF, 30 MCG/0.3 ML DOSE VACCINE: CPT | Mod: PBBFAC | Performed by: FAMILY MEDICINE

## 2021-05-10 PROBLEM — M75.101 ROTATOR CUFF SYNDROME, RIGHT: Status: ACTIVE | Noted: 2021-05-10

## 2021-09-28 ENCOUNTER — LAB VISIT (OUTPATIENT)
Dept: LAB | Facility: HOSPITAL | Age: 61
End: 2021-09-28
Attending: PHYSICIAN ASSISTANT
Payer: COMMERCIAL

## 2021-09-28 ENCOUNTER — OFFICE VISIT (OUTPATIENT)
Dept: FAMILY MEDICINE | Facility: CLINIC | Age: 61
End: 2021-09-28
Payer: COMMERCIAL

## 2021-09-28 VITALS
SYSTOLIC BLOOD PRESSURE: 130 MMHG | HEIGHT: 63 IN | OXYGEN SATURATION: 98 % | HEART RATE: 54 BPM | BODY MASS INDEX: 34.91 KG/M2 | DIASTOLIC BLOOD PRESSURE: 84 MMHG | WEIGHT: 197 LBS

## 2021-09-28 DIAGNOSIS — K91.2 POSTSURGICAL MALABSORPTION: ICD-10-CM

## 2021-09-28 DIAGNOSIS — Z90.3 HISTORY OF GASTRECTOMY: ICD-10-CM

## 2021-09-28 DIAGNOSIS — E74.10 FRUCTOSE MALABSORPTION: Primary | ICD-10-CM

## 2021-09-28 DIAGNOSIS — R26.89 BALANCE PROBLEMS: ICD-10-CM

## 2021-09-28 DIAGNOSIS — F41.1 GENERALIZED ANXIETY DISORDER: ICD-10-CM

## 2021-09-28 LAB
HCYS SERPL-SCNC: 4.9 UMOL/L (ref 4–15.5)
VIT B12 SERPL-MCNC: 520 PG/ML (ref 210–950)

## 2021-09-28 PROCEDURE — 99214 OFFICE O/P EST MOD 30 MIN: CPT | Mod: S$GLB,,, | Performed by: PHYSICIAN ASSISTANT

## 2021-09-28 PROCEDURE — 1160F PR REVIEW ALL MEDS BY PRESCRIBER/CLIN PHARMACIST DOCUMENTED: ICD-10-PCS | Mod: CPTII,S$GLB,, | Performed by: PHYSICIAN ASSISTANT

## 2021-09-28 PROCEDURE — 83921 ORGANIC ACID SINGLE QUANT: CPT | Performed by: PHYSICIAN ASSISTANT

## 2021-09-28 PROCEDURE — 99999 PR PBB SHADOW E&M-EST. PATIENT-LVL V: CPT | Mod: PBBFAC,,, | Performed by: PHYSICIAN ASSISTANT

## 2021-09-28 PROCEDURE — 3008F BODY MASS INDEX DOCD: CPT | Mod: CPTII,S$GLB,, | Performed by: PHYSICIAN ASSISTANT

## 2021-09-28 PROCEDURE — 1160F RVW MEDS BY RX/DR IN RCRD: CPT | Mod: CPTII,S$GLB,, | Performed by: PHYSICIAN ASSISTANT

## 2021-09-28 PROCEDURE — 1159F PR MEDICATION LIST DOCUMENTED IN MEDICAL RECORD: ICD-10-PCS | Mod: CPTII,S$GLB,, | Performed by: PHYSICIAN ASSISTANT

## 2021-09-28 PROCEDURE — 3044F HG A1C LEVEL LT 7.0%: CPT | Mod: CPTII,S$GLB,, | Performed by: PHYSICIAN ASSISTANT

## 2021-09-28 PROCEDURE — 83090 ASSAY OF HOMOCYSTEINE: CPT | Performed by: PHYSICIAN ASSISTANT

## 2021-09-28 PROCEDURE — 3008F PR BODY MASS INDEX (BMI) DOCUMENTED: ICD-10-PCS | Mod: CPTII,S$GLB,, | Performed by: PHYSICIAN ASSISTANT

## 2021-09-28 PROCEDURE — 36415 COLL VENOUS BLD VENIPUNCTURE: CPT | Mod: PO | Performed by: PHYSICIAN ASSISTANT

## 2021-09-28 PROCEDURE — 99214 PR OFFICE/OUTPT VISIT, EST, LEVL IV, 30-39 MIN: ICD-10-PCS | Mod: S$GLB,,, | Performed by: PHYSICIAN ASSISTANT

## 2021-09-28 PROCEDURE — 3075F SYST BP GE 130 - 139MM HG: CPT | Mod: CPTII,S$GLB,, | Performed by: PHYSICIAN ASSISTANT

## 2021-09-28 PROCEDURE — 3079F DIAST BP 80-89 MM HG: CPT | Mod: CPTII,S$GLB,, | Performed by: PHYSICIAN ASSISTANT

## 2021-09-28 PROCEDURE — 3079F PR MOST RECENT DIASTOLIC BLOOD PRESSURE 80-89 MM HG: ICD-10-PCS | Mod: CPTII,S$GLB,, | Performed by: PHYSICIAN ASSISTANT

## 2021-09-28 PROCEDURE — 3044F PR MOST RECENT HEMOGLOBIN A1C LEVEL <7.0%: ICD-10-PCS | Mod: CPTII,S$GLB,, | Performed by: PHYSICIAN ASSISTANT

## 2021-09-28 PROCEDURE — 1159F MED LIST DOCD IN RCRD: CPT | Mod: CPTII,S$GLB,, | Performed by: PHYSICIAN ASSISTANT

## 2021-09-28 PROCEDURE — 82607 VITAMIN B-12: CPT | Performed by: PHYSICIAN ASSISTANT

## 2021-09-28 PROCEDURE — 99999 PR PBB SHADOW E&M-EST. PATIENT-LVL V: ICD-10-PCS | Mod: PBBFAC,,, | Performed by: PHYSICIAN ASSISTANT

## 2021-09-28 PROCEDURE — 3075F PR MOST RECENT SYSTOLIC BLOOD PRESS GE 130-139MM HG: ICD-10-PCS | Mod: CPTII,S$GLB,, | Performed by: PHYSICIAN ASSISTANT

## 2021-10-02 LAB — METHYLMALONATE SERPL-SCNC: 0.15 UMOL/L

## 2021-11-10 ENCOUNTER — TELEPHONE (OUTPATIENT)
Dept: VASCULAR SURGERY | Facility: CLINIC | Age: 61
End: 2021-11-10
Payer: COMMERCIAL

## 2021-11-10 ENCOUNTER — TELEPHONE (OUTPATIENT)
Dept: CARDIOTHORACIC SURGERY | Facility: CLINIC | Age: 61
End: 2021-11-10
Payer: COMMERCIAL

## 2021-11-11 ENCOUNTER — OFFICE VISIT (OUTPATIENT)
Dept: CARDIOTHORACIC SURGERY | Facility: CLINIC | Age: 61
End: 2021-11-11
Payer: COMMERCIAL

## 2021-11-11 ENCOUNTER — TELEPHONE (OUTPATIENT)
Dept: CARDIOTHORACIC SURGERY | Facility: CLINIC | Age: 61
End: 2021-11-11
Payer: COMMERCIAL

## 2021-11-11 DIAGNOSIS — I25.10 CAD (CORONARY ARTERY DISEASE): Primary | ICD-10-CM

## 2021-11-11 DIAGNOSIS — I25.10 NONOBSTRUCTIVE ATHEROSCLEROSIS OF CORONARY ARTERY: Primary | ICD-10-CM

## 2021-11-11 PROCEDURE — 3044F HG A1C LEVEL LT 7.0%: CPT | Mod: CPTII,S$GLB,, | Performed by: THORACIC SURGERY (CARDIOTHORACIC VASCULAR SURGERY)

## 2021-11-11 PROCEDURE — 1160F PR REVIEW ALL MEDS BY PRESCRIBER/CLIN PHARMACIST DOCUMENTED: ICD-10-PCS | Mod: CPTII,S$GLB,, | Performed by: THORACIC SURGERY (CARDIOTHORACIC VASCULAR SURGERY)

## 2021-11-11 PROCEDURE — 99999 PR PBB SHADOW E&M-EST. PATIENT-LVL II: CPT | Mod: PBBFAC,,, | Performed by: THORACIC SURGERY (CARDIOTHORACIC VASCULAR SURGERY)

## 2021-11-11 PROCEDURE — 3044F PR MOST RECENT HEMOGLOBIN A1C LEVEL <7.0%: ICD-10-PCS | Mod: CPTII,S$GLB,, | Performed by: THORACIC SURGERY (CARDIOTHORACIC VASCULAR SURGERY)

## 2021-11-11 PROCEDURE — 99205 OFFICE O/P NEW HI 60 MIN: CPT | Mod: S$GLB,,, | Performed by: THORACIC SURGERY (CARDIOTHORACIC VASCULAR SURGERY)

## 2021-11-11 PROCEDURE — 99999 PR PBB SHADOW E&M-EST. PATIENT-LVL II: ICD-10-PCS | Mod: PBBFAC,,, | Performed by: THORACIC SURGERY (CARDIOTHORACIC VASCULAR SURGERY)

## 2021-11-11 PROCEDURE — 99205 PR OFFICE/OUTPT VISIT, NEW, LEVL V, 60-74 MIN: ICD-10-PCS | Mod: S$GLB,,, | Performed by: THORACIC SURGERY (CARDIOTHORACIC VASCULAR SURGERY)

## 2021-11-11 PROCEDURE — 1159F MED LIST DOCD IN RCRD: CPT | Mod: CPTII,S$GLB,, | Performed by: THORACIC SURGERY (CARDIOTHORACIC VASCULAR SURGERY)

## 2021-11-11 PROCEDURE — 1159F PR MEDICATION LIST DOCUMENTED IN MEDICAL RECORD: ICD-10-PCS | Mod: CPTII,S$GLB,, | Performed by: THORACIC SURGERY (CARDIOTHORACIC VASCULAR SURGERY)

## 2021-11-11 PROCEDURE — 1160F RVW MEDS BY RX/DR IN RCRD: CPT | Mod: CPTII,S$GLB,, | Performed by: THORACIC SURGERY (CARDIOTHORACIC VASCULAR SURGERY)

## 2021-11-11 RX ORDER — POTASSIUM CHLORIDE 14.9 MG/ML
60 INJECTION INTRAVENOUS
Status: CANCELLED | OUTPATIENT
Start: 2021-11-11

## 2021-11-11 RX ORDER — ACETAMINOPHEN 325 MG/1
650 TABLET ORAL EVERY 4 HOURS PRN
Status: CANCELLED | OUTPATIENT
Start: 2021-11-11

## 2021-11-11 RX ORDER — ASPIRIN 325 MG
325 TABLET, DELAYED RELEASE (ENTERIC COATED) ORAL DAILY
Status: CANCELLED | OUTPATIENT
Start: 2021-11-12

## 2021-11-11 RX ORDER — FENTANYL CITRATE 50 UG/ML
25 INJECTION, SOLUTION INTRAMUSCULAR; INTRAVENOUS
Status: CANCELLED | OUTPATIENT
Start: 2021-11-11

## 2021-11-11 RX ORDER — LIDOCAINE HYDROCHLORIDE 10 MG/ML
1 INJECTION, SOLUTION EPIDURAL; INFILTRATION; INTRACAUDAL; PERINEURAL
Status: CANCELLED | OUTPATIENT
Start: 2021-11-11

## 2021-11-11 RX ORDER — METOCLOPRAMIDE HYDROCHLORIDE 5 MG/ML
5 INJECTION INTRAMUSCULAR; INTRAVENOUS EVERY 6 HOURS PRN
Status: CANCELLED | OUTPATIENT
Start: 2021-11-11

## 2021-11-11 RX ORDER — ASPIRIN 325 MG
325 TABLET ORAL ONCE
Status: CANCELLED | OUTPATIENT
Start: 2021-11-11 | End: 2021-11-11

## 2021-11-11 RX ORDER — IPRATROPIUM BROMIDE AND ALBUTEROL SULFATE 2.5; .5 MG/3ML; MG/3ML
3 SOLUTION RESPIRATORY (INHALATION) EVERY 4 HOURS PRN
Status: CANCELLED | OUTPATIENT
Start: 2021-11-11 | End: 2021-11-12

## 2021-11-11 RX ORDER — FAMOTIDINE 20 MG/1
20 TABLET, FILM COATED ORAL 2 TIMES DAILY
Status: CANCELLED | OUTPATIENT
Start: 2021-11-11

## 2021-11-11 RX ORDER — ASPIRIN 300 MG/1
300 SUPPOSITORY RECTAL ONCE AS NEEDED
Status: CANCELLED | OUTPATIENT
Start: 2021-11-11 | End: 2033-04-09

## 2021-11-11 RX ORDER — MUPIROCIN 20 MG/G
1 OINTMENT TOPICAL 2 TIMES DAILY
Status: CANCELLED | OUTPATIENT
Start: 2021-11-11 | End: 2021-11-16

## 2021-11-11 RX ORDER — OXYCODONE HYDROCHLORIDE 5 MG/1
5 TABLET ORAL EVERY 4 HOURS PRN
Status: CANCELLED | OUTPATIENT
Start: 2021-11-11

## 2021-11-11 RX ORDER — POTASSIUM CHLORIDE 20 MEQ/1
20 TABLET, EXTENDED RELEASE ORAL EVERY 12 HOURS
Status: CANCELLED | OUTPATIENT
Start: 2021-11-11

## 2021-11-11 RX ORDER — CLINDAMYCIN PHOSPHATE 900 MG/50ML
900 INJECTION, SOLUTION INTRAVENOUS
Status: CANCELLED | OUTPATIENT
Start: 2021-11-11

## 2021-11-11 RX ORDER — POTASSIUM CHLORIDE 14.9 MG/ML
20 INJECTION INTRAVENOUS
Status: CANCELLED | OUTPATIENT
Start: 2021-11-11

## 2021-11-11 RX ORDER — SODIUM CHLORIDE 9 MG/ML
INJECTION, SOLUTION INTRAVENOUS CONTINUOUS
Status: CANCELLED | OUTPATIENT
Start: 2021-11-11

## 2021-11-11 RX ORDER — POLYETHYLENE GLYCOL 3350 17 G/17G
17 POWDER, FOR SOLUTION ORAL DAILY
Status: CANCELLED | OUTPATIENT
Start: 2021-11-12

## 2021-11-11 RX ORDER — FENTANYL CITRATE 50 UG/ML
50 INJECTION, SOLUTION INTRAMUSCULAR; INTRAVENOUS
Status: CANCELLED | OUTPATIENT
Start: 2021-11-17

## 2021-11-11 RX ORDER — ONDANSETRON 2 MG/ML
4 INJECTION INTRAMUSCULAR; INTRAVENOUS EVERY 12 HOURS PRN
Status: CANCELLED | OUTPATIENT
Start: 2021-11-11

## 2021-11-11 RX ORDER — SODIUM CHLORIDE 0.9 % (FLUSH) 0.9 %
10 SYRINGE (ML) INJECTION
Status: CANCELLED | OUTPATIENT
Start: 2021-11-11

## 2021-11-11 RX ORDER — ATORVASTATIN CALCIUM 40 MG/1
40 TABLET, FILM COATED ORAL NIGHTLY
Status: CANCELLED | OUTPATIENT
Start: 2021-11-11

## 2021-11-11 RX ORDER — FAMOTIDINE 10 MG/ML
20 INJECTION INTRAVENOUS 2 TIMES DAILY
Status: CANCELLED | OUTPATIENT
Start: 2021-11-11

## 2021-11-11 RX ORDER — MAGNESIUM SULFATE HEPTAHYDRATE 40 MG/ML
4 INJECTION, SOLUTION INTRAVENOUS
Status: CANCELLED | OUTPATIENT
Start: 2021-11-11

## 2021-11-11 RX ORDER — IPRATROPIUM BROMIDE AND ALBUTEROL SULFATE 2.5; .5 MG/3ML; MG/3ML
3 SOLUTION RESPIRATORY (INHALATION) EVERY 4 HOURS
Status: CANCELLED | OUTPATIENT
Start: 2021-11-11 | End: 2021-11-12

## 2021-11-11 RX ORDER — BISACODYL 10 MG
10 SUPPOSITORY, RECTAL RECTAL DAILY PRN
Status: CANCELLED | OUTPATIENT
Start: 2021-11-11

## 2021-11-11 RX ORDER — POTASSIUM CHLORIDE 29.8 MG/ML
40 INJECTION INTRAVENOUS
Status: CANCELLED | OUTPATIENT
Start: 2021-11-11

## 2021-11-11 RX ORDER — DEXTROSE MONOHYDRATE, SODIUM CHLORIDE, AND POTASSIUM CHLORIDE 50; 1.49; 4.5 G/1000ML; G/1000ML; G/1000ML
INJECTION, SOLUTION INTRAVENOUS CONTINUOUS
Status: CANCELLED | OUTPATIENT
Start: 2021-11-11

## 2021-11-11 RX ORDER — OXYCODONE HYDROCHLORIDE 10 MG/1
10 TABLET ORAL EVERY 4 HOURS PRN
Status: CANCELLED | OUTPATIENT
Start: 2021-11-11

## 2021-11-11 RX ORDER — ASPIRIN 325 MG
325 TABLET ORAL DAILY
Status: CANCELLED | OUTPATIENT
Start: 2021-11-12

## 2021-11-11 RX ORDER — CLINDAMYCIN PHOSPHATE 900 MG/50ML
900 INJECTION, SOLUTION INTRAVENOUS
Status: CANCELLED | OUTPATIENT
Start: 2021-11-11 | End: 2021-11-13

## 2021-11-11 RX ORDER — DOCUSATE SODIUM 100 MG/1
100 CAPSULE, LIQUID FILLED ORAL 2 TIMES DAILY
Status: CANCELLED | OUTPATIENT
Start: 2021-11-11

## 2021-11-11 RX ORDER — FENTANYL CITRATE 50 UG/ML
25 INJECTION, SOLUTION INTRAMUSCULAR; INTRAVENOUS
Status: CANCELLED | OUTPATIENT
Start: 2021-11-11 | End: 2021-11-16

## 2021-11-11 RX ORDER — ALBUMIN HUMAN 50 G/1000ML
25 SOLUTION INTRAVENOUS ONCE AS NEEDED
Status: CANCELLED | OUTPATIENT
Start: 2021-11-11 | End: 2033-04-09

## 2021-11-11 RX ORDER — MUPIROCIN 20 MG/G
1 OINTMENT TOPICAL
Status: CANCELLED | OUTPATIENT
Start: 2021-11-11

## 2021-11-11 RX ORDER — METOPROLOL TARTRATE 25 MG/1
25 TABLET, FILM COATED ORAL
Status: CANCELLED | OUTPATIENT
Start: 2021-11-11

## 2021-11-11 RX ORDER — MAGNESIUM SULFATE HEPTAHYDRATE 40 MG/ML
2 INJECTION, SOLUTION INTRAVENOUS
Status: CANCELLED | OUTPATIENT
Start: 2021-11-11

## 2021-11-12 ENCOUNTER — OFFICE VISIT (OUTPATIENT)
Dept: CARDIOLOGY | Facility: CLINIC | Age: 61
End: 2021-11-12
Payer: COMMERCIAL

## 2021-11-12 ENCOUNTER — TELEPHONE (OUTPATIENT)
Dept: CARDIOTHORACIC SURGERY | Facility: CLINIC | Age: 61
End: 2021-11-12
Payer: COMMERCIAL

## 2021-11-12 ENCOUNTER — HOSPITAL ENCOUNTER (OUTPATIENT)
Dept: VASCULAR SURGERY | Facility: CLINIC | Age: 61
Discharge: HOME | End: 2021-11-12
Attending: THORACIC SURGERY (CARDIOTHORACIC VASCULAR SURGERY)
Payer: COMMERCIAL

## 2021-11-12 ENCOUNTER — HOSPITAL ENCOUNTER (OUTPATIENT)
Dept: PULMONOLOGY | Facility: CLINIC | Age: 61
Discharge: HOME | End: 2021-11-12
Payer: COMMERCIAL

## 2021-11-12 ENCOUNTER — ANESTHESIA EVENT (OUTPATIENT)
Dept: SURGERY | Facility: HOSPITAL | Age: 61
DRG: 236 | End: 2021-11-12
Payer: COMMERCIAL

## 2021-11-12 ENCOUNTER — HOSPITAL ENCOUNTER (OUTPATIENT)
Dept: CARDIOLOGY | Facility: CLINIC | Age: 61
Discharge: HOME | End: 2021-11-12
Payer: COMMERCIAL

## 2021-11-12 ENCOUNTER — HOSPITAL ENCOUNTER (OUTPATIENT)
Dept: RADIOLOGY | Facility: HOSPITAL | Age: 61
Discharge: HOME | DRG: 236 | End: 2021-11-12
Attending: THORACIC SURGERY (CARDIOTHORACIC VASCULAR SURGERY)
Payer: COMMERCIAL

## 2021-11-12 VITALS
BODY MASS INDEX: 32.77 KG/M2 | SYSTOLIC BLOOD PRESSURE: 142 MMHG | OXYGEN SATURATION: 97 % | DIASTOLIC BLOOD PRESSURE: 68 MMHG | HEIGHT: 63 IN | HEART RATE: 62 BPM | WEIGHT: 184.94 LBS

## 2021-11-12 DIAGNOSIS — I25.10 NONOBSTRUCTIVE ATHEROSCLEROSIS OF CORONARY ARTERY: ICD-10-CM

## 2021-11-12 DIAGNOSIS — I25.10 CORONARY ARTERY DISEASE DUE TO CALCIFIED CORONARY LESION: Primary | ICD-10-CM

## 2021-11-12 DIAGNOSIS — Z01.818 PRE-OP EXAM: ICD-10-CM

## 2021-11-12 DIAGNOSIS — I25.84 CORONARY ARTERY DISEASE DUE TO CALCIFIED CORONARY LESION: Primary | ICD-10-CM

## 2021-11-12 DIAGNOSIS — I25.110 CORONARY ARTERY DISEASE INVOLVING NATIVE CORONARY ARTERY OF NATIVE HEART WITH UNSTABLE ANGINA PECTORIS: Primary | ICD-10-CM

## 2021-11-12 DIAGNOSIS — I65.23 BILATERAL CAROTID ARTERY STENOSIS: ICD-10-CM

## 2021-11-12 LAB
DLCO ADJ PRE: 22.31 ML/(MIN*MMHG) (ref 16.39–27.85)
DLCO SINGLE BREATH LLN: 16.39
DLCO SINGLE BREATH PRE REF: 99.3 %
DLCO SINGLE BREATH REF: 22.12
DLCOC SBVA LLN: 3.1
DLCOC SBVA PRE REF: 90.1 %
DLCOC SBVA REF: 4.64
DLCOC SINGLE BREATH LLN: 16.39
DLCOC SINGLE BREATH PRE REF: 100.9 %
DLCOC SINGLE BREATH REF: 22.12
DLCOCSBVAULN: 6.18
DLCOCSINGLEBREATHULN: 27.85
DLCOSINGLEBREATHULN: 27.85
DLCOVA LLN: 3.1
DLCOVA PRE REF: 88.7 %
DLCOVA PRE: 4.11 ML/(MIN*MMHG*L) (ref 3.1–6.18)
DLCOVA REF: 4.64
DLCOVAULN: 6.18
DLVAADJ PRE: 4.18 ML/(MIN*MMHG*L) (ref 3.1–6.18)
FEF 25 75 LLN: 1.14
FEF 25 75 PRE REF: 99.6 %
FEF 25 75 REF: 2.22
FEV05 LLN: 0.94
FEV05 REF: 1.79
FEV1 FVC LLN: 67
FEV1 FVC PRE REF: 98.6 %
FEV1 FVC REF: 79
FEV1 LLN: 1.83
FEV1 PRE REF: 97.9 %
FEV1 REF: 2.41
FVC LLN: 2.32
FVC PRE REF: 98.7 %
FVC REF: 3.06
IVC PRE: 2.9 L (ref 2.32–3.8)
IVC SINGLE BREATH LLN: 2.32
IVC SINGLE BREATH PRE REF: 95 %
IVC SINGLE BREATH REF: 3.06
IVCSINGLEBREATHULN: 3.8
MVV LLN: 75
MVV PRE REF: 125 %
MVV REF: 89
PEF LLN: 4.48
PEF PRE REF: 127.8 %
PEF REF: 6.14
PHYSICIAN COMMENT: ABNORMAL
PRE DLCO: 21.96 ML/(MIN*MMHG) (ref 16.39–27.85)
PRE FEF 25 75: 2.21 L/S (ref 1.14–3.31)
PRE FET 100: 5.54 SEC
PRE FEV05 REF: 110.1 %
PRE FEV1 FVC: 78.36 % (ref 67.44–91.51)
PRE FEV1: 2.36 L (ref 1.83–3)
PRE FEV5: 1.98 L (ref 0.94–2.65)
PRE FVC: 3.02 L (ref 2.32–3.8)
PRE MVV: 111 L/MIN (ref 75.48–102.12)
PRE PEF: 7.85 L/S (ref 4.48–7.8)
VA PRE: 5.34 L (ref 4.62–4.62)
VA SINGLE BREATH PRE REF: 115.6 %
VA SINGLE BREATH REF: 4.62

## 2021-11-12 PROCEDURE — 93010 EKG 12-LEAD: ICD-10-PCS | Mod: S$GLB,,, | Performed by: INTERNAL MEDICINE

## 2021-11-12 PROCEDURE — 93005 EKG 12-LEAD: ICD-10-PCS | Mod: S$GLB,,, | Performed by: THORACIC SURGERY (CARDIOTHORACIC VASCULAR SURGERY)

## 2021-11-12 PROCEDURE — 94729 DIFFUSING CAPACITY: CPT | Mod: S$GLB,,, | Performed by: INTERNAL MEDICINE

## 2021-11-12 PROCEDURE — 94010 BREATHING CAPACITY TEST: ICD-10-PCS | Mod: S$GLB,,, | Performed by: INTERNAL MEDICINE

## 2021-11-12 PROCEDURE — 3044F PR MOST RECENT HEMOGLOBIN A1C LEVEL <7.0%: ICD-10-PCS | Mod: CPTII,S$GLB,, | Performed by: INTERNAL MEDICINE

## 2021-11-12 PROCEDURE — 99999 PR PBB SHADOW E&M-EST. PATIENT-LVL IV: CPT | Mod: PBBFAC,,, | Performed by: INTERNAL MEDICINE

## 2021-11-12 PROCEDURE — 93010 ELECTROCARDIOGRAM REPORT: CPT | Mod: S$GLB,,, | Performed by: INTERNAL MEDICINE

## 2021-11-12 PROCEDURE — 3077F PR MOST RECENT SYSTOLIC BLOOD PRESSURE >= 140 MM HG: ICD-10-PCS | Mod: CPTII,S$GLB,, | Performed by: INTERNAL MEDICINE

## 2021-11-12 PROCEDURE — 71046 X-RAY EXAM CHEST 2 VIEWS: CPT | Mod: TC,FY

## 2021-11-12 PROCEDURE — 94729 PR C02/MEMBANE DIFFUSE CAPACITY: ICD-10-PCS | Mod: S$GLB,,, | Performed by: INTERNAL MEDICINE

## 2021-11-12 PROCEDURE — 93005 ELECTROCARDIOGRAM TRACING: CPT | Mod: S$GLB,,, | Performed by: THORACIC SURGERY (CARDIOTHORACIC VASCULAR SURGERY)

## 2021-11-12 PROCEDURE — 3044F HG A1C LEVEL LT 7.0%: CPT | Mod: CPTII,S$GLB,, | Performed by: INTERNAL MEDICINE

## 2021-11-12 PROCEDURE — 3078F DIAST BP <80 MM HG: CPT | Mod: CPTII,S$GLB,, | Performed by: INTERNAL MEDICINE

## 2021-11-12 PROCEDURE — 93880 EXTRACRANIAL BILAT STUDY: CPT | Mod: S$GLB,,, | Performed by: SURGERY

## 2021-11-12 PROCEDURE — 94010 BREATHING CAPACITY TEST: CPT | Mod: S$GLB,,, | Performed by: INTERNAL MEDICINE

## 2021-11-12 PROCEDURE — 71046 X-RAY EXAM CHEST 2 VIEWS: CPT | Mod: 26,,, | Performed by: RADIOLOGY

## 2021-11-12 PROCEDURE — 99999 PR PBB SHADOW E&M-EST. PATIENT-LVL IV: ICD-10-PCS | Mod: PBBFAC,,, | Performed by: INTERNAL MEDICINE

## 2021-11-12 PROCEDURE — 99205 PR OFFICE/OUTPT VISIT, NEW, LEVL V, 60-74 MIN: ICD-10-PCS | Mod: S$GLB,,, | Performed by: INTERNAL MEDICINE

## 2021-11-12 PROCEDURE — 3008F BODY MASS INDEX DOCD: CPT | Mod: CPTII,S$GLB,, | Performed by: INTERNAL MEDICINE

## 2021-11-12 PROCEDURE — 71046 XR CHEST PA AND LATERAL PRE-OP: ICD-10-PCS | Mod: 26,,, | Performed by: RADIOLOGY

## 2021-11-12 PROCEDURE — 3008F PR BODY MASS INDEX (BMI) DOCUMENTED: ICD-10-PCS | Mod: CPTII,S$GLB,, | Performed by: INTERNAL MEDICINE

## 2021-11-12 PROCEDURE — 3077F SYST BP >= 140 MM HG: CPT | Mod: CPTII,S$GLB,, | Performed by: INTERNAL MEDICINE

## 2021-11-12 PROCEDURE — 93880 PR DUPLEX SCAN EXTRACRANIAL,BILAT: ICD-10-PCS | Mod: S$GLB,,, | Performed by: SURGERY

## 2021-11-12 PROCEDURE — 3078F PR MOST RECENT DIASTOLIC BLOOD PRESSURE < 80 MM HG: ICD-10-PCS | Mod: CPTII,S$GLB,, | Performed by: INTERNAL MEDICINE

## 2021-11-12 PROCEDURE — 99205 OFFICE O/P NEW HI 60 MIN: CPT | Mod: S$GLB,,, | Performed by: INTERNAL MEDICINE

## 2021-11-15 ENCOUNTER — ANESTHESIA (OUTPATIENT)
Dept: SURGERY | Facility: HOSPITAL | Age: 61
DRG: 236 | End: 2021-11-15
Payer: COMMERCIAL

## 2021-11-15 ENCOUNTER — HOSPITAL ENCOUNTER (INPATIENT)
Facility: HOSPITAL | Age: 61
LOS: 6 days | Discharge: HOME | DRG: 236 | End: 2021-11-21
Attending: THORACIC SURGERY (CARDIOTHORACIC VASCULAR SURGERY) | Admitting: THORACIC SURGERY (CARDIOTHORACIC VASCULAR SURGERY)
Payer: COMMERCIAL

## 2021-11-15 DIAGNOSIS — Z95.1 S/P CABG X 3: ICD-10-CM

## 2021-11-15 DIAGNOSIS — I49.9 ARRHYTHMIA: ICD-10-CM

## 2021-11-15 DIAGNOSIS — Z98.890 HISTORY OF HEART SURGERY: ICD-10-CM

## 2021-11-15 DIAGNOSIS — I25.10 CAD (CORONARY ARTERY DISEASE): ICD-10-CM

## 2021-11-15 LAB
ALBUMIN SERPL BCP-MCNC: 2.4 G/DL (ref 3.5–5.2)
ALLENS TEST: ABNORMAL
ALLENS TEST: ABNORMAL
ALP SERPL-CCNC: 103 U/L (ref 55–135)
ALT SERPL W/O P-5'-P-CCNC: 24 U/L (ref 10–44)
ANION GAP SERPL CALC-SCNC: 8 MMOL/L (ref 8–16)
APTT BLDCRRT: 25.6 SEC (ref 21–32)
AST SERPL-CCNC: 38 U/L (ref 10–40)
BASOPHILS # BLD AUTO: 0.03 K/UL (ref 0–0.2)
BASOPHILS NFR BLD: 0.2 % (ref 0–1.9)
BILIRUB SERPL-MCNC: 0.7 MG/DL (ref 0.1–1)
BUN SERPL-MCNC: 11 MG/DL (ref 6–20)
CALCIUM SERPL-MCNC: 7.7 MG/DL (ref 8.7–10.5)
CHLORIDE SERPL-SCNC: 115 MMOL/L (ref 95–110)
CO2 SERPL-SCNC: 21 MMOL/L (ref 23–29)
CREAT SERPL-MCNC: 0.5 MG/DL (ref 0.5–1.4)
DELSYS: ABNORMAL
DELSYS: ABNORMAL
DIFFERENTIAL METHOD: ABNORMAL
EOSINOPHIL # BLD AUTO: 0 K/UL (ref 0–0.5)
EOSINOPHIL NFR BLD: 0.1 % (ref 0–8)
ERYTHROCYTE [DISTWIDTH] IN BLOOD BY AUTOMATED COUNT: 12.7 % (ref 11.5–14.5)
ERYTHROCYTE [SEDIMENTATION RATE] IN BLOOD BY WESTERGREN METHOD: 15 MM/H
ERYTHROCYTE [SEDIMENTATION RATE] IN BLOOD BY WESTERGREN METHOD: 15 MM/H
EST. GFR  (AFRICAN AMERICAN): >60 ML/MIN/1.73 M^2
EST. GFR  (NON AFRICAN AMERICAN): >60 ML/MIN/1.73 M^2
FIBRINOGEN PPP-MCNC: 253 MG/DL (ref 182–400)
FIO2: 100
FIO2: 100
FIO2: 50
FIO2: 55
FIO2: 60
GLUCOSE SERPL-MCNC: 129 MG/DL (ref 70–110)
GLUCOSE SERPL-MCNC: 184 MG/DL (ref 70–110)
GLUCOSE SERPL-MCNC: 196 MG/DL (ref 70–110)
GLUCOSE SERPL-MCNC: 226 MG/DL (ref 70–110)
GLUCOSE SERPL-MCNC: 242 MG/DL (ref 70–110)
GLUCOSE SERPL-MCNC: 242 MG/DL (ref 70–110)
GLUCOSE SERPL-MCNC: 96 MG/DL (ref 70–110)
HCO3 UR-SCNC: 23.3 MMOL/L (ref 24–28)
HCO3 UR-SCNC: 23.8 MMOL/L (ref 24–28)
HCO3 UR-SCNC: 23.9 MMOL/L (ref 24–28)
HCO3 UR-SCNC: 24.1 MMOL/L (ref 24–28)
HCO3 UR-SCNC: 24.1 MMOL/L (ref 24–28)
HCO3 UR-SCNC: 24.6 MMOL/L (ref 24–28)
HCO3 UR-SCNC: 25.1 MMOL/L (ref 24–28)
HCT VFR BLD AUTO: 27.4 % (ref 37–48.5)
HCT VFR BLD CALC: 22 %PCV (ref 36–54)
HCT VFR BLD CALC: 23 %PCV (ref 36–54)
HCT VFR BLD CALC: 24 %PCV (ref 36–54)
HCT VFR BLD CALC: 25 %PCV (ref 36–54)
HCT VFR BLD CALC: 29 %PCV (ref 36–54)
HGB BLD-MCNC: 9.3 G/DL (ref 12–16)
IMM GRANULOCYTES # BLD AUTO: 0.14 K/UL (ref 0–0.04)
IMM GRANULOCYTES NFR BLD AUTO: 0.8 % (ref 0–0.5)
INR PPP: 1.1 (ref 0.8–1.2)
LDH SERPL L TO P-CCNC: 0.82 MMOL/L (ref 0.36–1.25)
LDH SERPL L TO P-CCNC: 1.1 MMOL/L (ref 0.36–1.25)
LDH SERPL L TO P-CCNC: 1.58 MMOL/L (ref 0.36–1.25)
LDH SERPL L TO P-CCNC: 2.51 MMOL/L (ref 0.36–1.25)
LDH SERPL L TO P-CCNC: 3.27 MMOL/L (ref 0.36–1.25)
LYMPHOCYTES # BLD AUTO: 1.3 K/UL (ref 1–4.8)
LYMPHOCYTES NFR BLD: 7.5 % (ref 18–48)
MAGNESIUM SERPL-MCNC: 2.5 MG/DL (ref 1.6–2.6)
MCH RBC QN AUTO: 29.1 PG (ref 27–31)
MCHC RBC AUTO-ENTMCNC: 33.9 G/DL (ref 32–36)
MCV RBC AUTO: 86 FL (ref 82–98)
MODE: ABNORMAL
MODE: ABNORMAL
MONOCYTES # BLD AUTO: 0.9 K/UL (ref 0.3–1)
MONOCYTES NFR BLD: 4.9 % (ref 4–15)
NEUTROPHILS # BLD AUTO: 15.2 K/UL (ref 1.8–7.7)
NEUTROPHILS NFR BLD: 86.5 % (ref 38–73)
NRBC BLD-RTO: 0 /100 WBC
PCO2 BLDA: 38.8 MMHG (ref 35–45)
PCO2 BLDA: 39.4 MMHG (ref 35–45)
PCO2 BLDA: 41 MMHG (ref 35–45)
PCO2 BLDA: 41.1 MMHG (ref 35–45)
PCO2 BLDA: 41.2 MMHG (ref 35–45)
PCO2 BLDA: 41.5 MMHG (ref 35–45)
PCO2 BLDA: 44.5 MMHG (ref 35–45)
PEEP: 5
PEEP: 5
PH SMN: 7.34 [PH] (ref 7.35–7.45)
PH SMN: 7.36 [PH] (ref 7.35–7.45)
PH SMN: 7.37 [PH] (ref 7.35–7.45)
PH SMN: 7.37 [PH] (ref 7.35–7.45)
PH SMN: 7.38 [PH] (ref 7.35–7.45)
PH SMN: 7.39 [PH] (ref 7.35–7.45)
PH SMN: 7.41 [PH] (ref 7.35–7.45)
PHOSPHATE SERPL-MCNC: 3 MG/DL (ref 2.7–4.5)
PLATELET # BLD AUTO: 178 K/UL (ref 150–450)
PMV BLD AUTO: 10.5 FL (ref 9.2–12.9)
PO2 BLDA: 178 MMHG (ref 80–100)
PO2 BLDA: 179 MMHG (ref 80–100)
PO2 BLDA: 217 MMHG (ref 80–100)
PO2 BLDA: 238 MMHG (ref 80–100)
PO2 BLDA: 241 MMHG (ref 80–100)
PO2 BLDA: 260 MMHG (ref 80–100)
PO2 BLDA: 39 MMHG (ref 40–60)
POC BE: -1 MMOL/L
POC BE: -2 MMOL/L
POC BE: -2 MMOL/L
POC BE: 0 MMOL/L
POC BE: 0 MMOL/L
POC IONIZED CALCIUM: 1.05 MMOL/L (ref 1.06–1.42)
POC IONIZED CALCIUM: 1.05 MMOL/L (ref 1.06–1.42)
POC IONIZED CALCIUM: 1.09 MMOL/L (ref 1.06–1.42)
POC IONIZED CALCIUM: 1.11 MMOL/L (ref 1.06–1.42)
POC IONIZED CALCIUM: 1.18 MMOL/L (ref 1.06–1.42)
POC IONIZED CALCIUM: 1.23 MMOL/L (ref 1.06–1.42)
POC IONIZED CALCIUM: 1.39 MMOL/L (ref 1.06–1.42)
POC SATURATED O2: 100 % (ref 95–100)
POC SATURATED O2: 74 % (ref 95–100)
POC TCO2: 25 MMOL/L (ref 23–27)
POC TCO2: 25 MMOL/L (ref 24–29)
POC TCO2: 26 MMOL/L (ref 23–27)
POC TCO2: 26 MMOL/L (ref 23–27)
POCT GLUCOSE: 110 MG/DL (ref 70–110)
POCT GLUCOSE: 115 MG/DL (ref 70–110)
POCT GLUCOSE: 128 MG/DL (ref 70–110)
POCT GLUCOSE: 129 MG/DL (ref 70–110)
POCT GLUCOSE: 143 MG/DL (ref 70–110)
POTASSIUM BLD-SCNC: 3.3 MMOL/L (ref 3.5–5.1)
POTASSIUM BLD-SCNC: 3.4 MMOL/L (ref 3.5–5.1)
POTASSIUM BLD-SCNC: 3.4 MMOL/L (ref 3.5–5.1)
POTASSIUM BLD-SCNC: 3.8 MMOL/L (ref 3.5–5.1)
POTASSIUM BLD-SCNC: 4.1 MMOL/L (ref 3.5–5.1)
POTASSIUM BLD-SCNC: 4.3 MMOL/L (ref 3.5–5.1)
POTASSIUM BLD-SCNC: 4.4 MMOL/L (ref 3.5–5.1)
POTASSIUM SERPL-SCNC: 3.5 MMOL/L (ref 3.5–5.1)
POTASSIUM SERPL-SCNC: 3.5 MMOL/L (ref 3.5–5.1)
PROT SERPL-MCNC: 3.8 G/DL (ref 6–8.4)
PROTHROMBIN TIME: 11.7 SEC (ref 9–12.5)
RBC # BLD AUTO: 3.2 M/UL (ref 4–5.4)
SAMPLE: ABNORMAL
SAMPLE: NORMAL
SAMPLE: NORMAL
SITE: ABNORMAL
SITE: ABNORMAL
SODIUM BLD-SCNC: 140 MMOL/L (ref 136–145)
SODIUM BLD-SCNC: 140 MMOL/L (ref 136–145)
SODIUM BLD-SCNC: 142 MMOL/L (ref 136–145)
SODIUM BLD-SCNC: 142 MMOL/L (ref 136–145)
SODIUM BLD-SCNC: 143 MMOL/L (ref 136–145)
SODIUM BLD-SCNC: 145 MMOL/L (ref 136–145)
SODIUM BLD-SCNC: 145 MMOL/L (ref 136–145)
SODIUM SERPL-SCNC: 144 MMOL/L (ref 136–145)
VT: 350
VT: 350
WBC # BLD AUTO: 17.56 K/UL (ref 3.9–12.7)

## 2021-11-15 PROCEDURE — P9045 ALBUMIN (HUMAN), 5%, 250 ML: HCPCS | Mod: JG | Performed by: STUDENT IN AN ORGANIZED HEALTH CARE EDUCATION/TRAINING PROGRAM

## 2021-11-15 PROCEDURE — 36000712 HC OR TIME LEV V 1ST 15 MIN: Performed by: THORACIC SURGERY (CARDIOTHORACIC VASCULAR SURGERY)

## 2021-11-15 PROCEDURE — 25000003 PHARM REV CODE 250: Performed by: STUDENT IN AN ORGANIZED HEALTH CARE EDUCATION/TRAINING PROGRAM

## 2021-11-15 PROCEDURE — S0020 INJECTION, BUPIVICAINE HYDRO: HCPCS | Performed by: ANESTHESIOLOGY

## 2021-11-15 PROCEDURE — 36000713 HC OR TIME LEV V EA ADD 15 MIN: Performed by: THORACIC SURGERY (CARDIOTHORACIC VASCULAR SURGERY)

## 2021-11-15 PROCEDURE — 94761 N-INVAS EAR/PLS OXIMETRY MLT: CPT

## 2021-11-15 PROCEDURE — 27000191 HC C-V MONITORING

## 2021-11-15 PROCEDURE — 27100088 HC CELL SAVER

## 2021-11-15 PROCEDURE — 85384 FIBRINOGEN ACTIVITY: CPT | Performed by: STUDENT IN AN ORGANIZED HEALTH CARE EDUCATION/TRAINING PROGRAM

## 2021-11-15 PROCEDURE — 33533 CABG ARTERIAL SINGLE: CPT | Mod: ,,, | Performed by: THORACIC SURGERY (CARDIOTHORACIC VASCULAR SURGERY)

## 2021-11-15 PROCEDURE — 83735 ASSAY OF MAGNESIUM: CPT | Performed by: STUDENT IN AN ORGANIZED HEALTH CARE EDUCATION/TRAINING PROGRAM

## 2021-11-15 PROCEDURE — 63600175 PHARM REV CODE 636 W HCPCS: Performed by: STUDENT IN AN ORGANIZED HEALTH CARE EDUCATION/TRAINING PROGRAM

## 2021-11-15 PROCEDURE — 63600175 PHARM REV CODE 636 W HCPCS: Mod: JG | Performed by: STUDENT IN AN ORGANIZED HEALTH CARE EDUCATION/TRAINING PROGRAM

## 2021-11-15 PROCEDURE — 99499 NO LOS: ICD-10-PCS | Mod: ,,, | Performed by: ANESTHESIOLOGY

## 2021-11-15 PROCEDURE — 37000008 HC ANESTHESIA 1ST 15 MINUTES: Performed by: THORACIC SURGERY (CARDIOTHORACIC VASCULAR SURGERY)

## 2021-11-15 PROCEDURE — 63600175 PHARM REV CODE 636 W HCPCS: Performed by: THORACIC SURGERY (CARDIOTHORACIC VASCULAR SURGERY)

## 2021-11-15 PROCEDURE — 82330 ASSAY OF CALCIUM: CPT

## 2021-11-15 PROCEDURE — 84295 ASSAY OF SERUM SODIUM: CPT

## 2021-11-15 PROCEDURE — 27000175 HC ADULT BYPASS PUMP

## 2021-11-15 PROCEDURE — 99900035 HC TECH TIME PER 15 MIN (STAT)

## 2021-11-15 PROCEDURE — 36620 INSERTION CATHETER ARTERY: CPT | Mod: 59,,, | Performed by: ANESTHESIOLOGY

## 2021-11-15 PROCEDURE — 82803 BLOOD GASES ANY COMBINATION: CPT

## 2021-11-15 PROCEDURE — 27202608 HC CANNULA, MISC

## 2021-11-15 PROCEDURE — 33518 PR CABG, ARTERY-VEIN, TWO: ICD-10-PCS | Mod: ,,, | Performed by: THORACIC SURGERY (CARDIOTHORACIC VASCULAR SURGERY)

## 2021-11-15 PROCEDURE — 85014 HEMATOCRIT: CPT

## 2021-11-15 PROCEDURE — 36556 PR INSERT NON-TUNNEL CV CATH 5+ YRS OLD: ICD-10-PCS | Mod: 59,,, | Performed by: ANESTHESIOLOGY

## 2021-11-15 PROCEDURE — 83605 ASSAY OF LACTIC ACID: CPT

## 2021-11-15 PROCEDURE — C1729 CATH, DRAINAGE: HCPCS | Performed by: THORACIC SURGERY (CARDIOTHORACIC VASCULAR SURGERY)

## 2021-11-15 PROCEDURE — 85025 COMPLETE CBC W/AUTO DIFF WBC: CPT | Performed by: STUDENT IN AN ORGANIZED HEALTH CARE EDUCATION/TRAINING PROGRAM

## 2021-11-15 PROCEDURE — 27200953 HC CARDIOPLEGIA SYSTEM

## 2021-11-15 PROCEDURE — 64450 PR NERVE BLOCK INJ, ANES/STEROID, OTHER PERIPHERAL: ICD-10-PCS | Mod: 59,50,, | Performed by: ANESTHESIOLOGY

## 2021-11-15 PROCEDURE — 84132 ASSAY OF SERUM POTASSIUM: CPT

## 2021-11-15 PROCEDURE — 27201037 HC PRESSURE MONITORING SET UP

## 2021-11-15 PROCEDURE — 27100026 HC SHUNT SENSOR, TERUMO

## 2021-11-15 PROCEDURE — 94002 VENT MGMT INPAT INIT DAY: CPT

## 2021-11-15 PROCEDURE — 33533 PR CABG, ARTERIAL, SINGLE: ICD-10-PCS | Mod: ,,, | Performed by: THORACIC SURGERY (CARDIOTHORACIC VASCULAR SURGERY)

## 2021-11-15 PROCEDURE — 25000003 PHARM REV CODE 250: Performed by: THORACIC SURGERY (CARDIOTHORACIC VASCULAR SURGERY)

## 2021-11-15 PROCEDURE — C9248 INJ, CLEVIDIPINE BUTYRATE: HCPCS | Mod: JG | Performed by: STUDENT IN AN ORGANIZED HEALTH CARE EDUCATION/TRAINING PROGRAM

## 2021-11-15 PROCEDURE — 93312 ECHO TRANSESOPHAGEAL: CPT | Mod: 26,59,, | Performed by: ANESTHESIOLOGY

## 2021-11-15 PROCEDURE — 85730 THROMBOPLASTIN TIME PARTIAL: CPT | Performed by: STUDENT IN AN ORGANIZED HEALTH CARE EDUCATION/TRAINING PROGRAM

## 2021-11-15 PROCEDURE — 37799 UNLISTED PX VASCULAR SURGERY: CPT

## 2021-11-15 PROCEDURE — 93312: ICD-10-PCS | Mod: 26,59,, | Performed by: ANESTHESIOLOGY

## 2021-11-15 PROCEDURE — 99900026 HC AIRWAY MAINTENANCE (STAT)

## 2021-11-15 PROCEDURE — 25000003 PHARM REV CODE 250: Performed by: ANESTHESIOLOGY

## 2021-11-15 PROCEDURE — 25000003 PHARM REV CODE 250: Performed by: PHYSICIAN ASSISTANT

## 2021-11-15 PROCEDURE — 85520 HEPARIN ASSAY: CPT

## 2021-11-15 PROCEDURE — 99499 UNLISTED E&M SERVICE: CPT | Mod: ,,, | Performed by: ANESTHESIOLOGY

## 2021-11-15 PROCEDURE — D9220A PRA ANESTHESIA: ICD-10-PCS | Mod: ,,, | Performed by: ANESTHESIOLOGY

## 2021-11-15 PROCEDURE — 36620 PR INSERT CATH,ART,PERCUT,SHORTTERM: ICD-10-PCS | Mod: 59,,, | Performed by: ANESTHESIOLOGY

## 2021-11-15 PROCEDURE — 36556 INSERT NON-TUNNEL CV CATH: CPT | Mod: 59,,, | Performed by: ANESTHESIOLOGY

## 2021-11-15 PROCEDURE — D9220A PRA ANESTHESIA: Mod: ,,, | Performed by: ANESTHESIOLOGY

## 2021-11-15 PROCEDURE — 27201423 OPTIME MED/SURG SUP & DEVICES STERILE SUPPLY: Performed by: THORACIC SURGERY (CARDIOTHORACIC VASCULAR SURGERY)

## 2021-11-15 PROCEDURE — 33508 PR ENDOSCOPY W/VIDEO-ASST VEIN HARVEST,CABG: ICD-10-PCS | Mod: 59,,, | Performed by: THORACIC SURGERY (CARDIOTHORACIC VASCULAR SURGERY)

## 2021-11-15 PROCEDURE — 82800 BLOOD PH: CPT

## 2021-11-15 PROCEDURE — 84100 ASSAY OF PHOSPHORUS: CPT | Performed by: STUDENT IN AN ORGANIZED HEALTH CARE EDUCATION/TRAINING PROGRAM

## 2021-11-15 PROCEDURE — 85610 PROTHROMBIN TIME: CPT | Performed by: STUDENT IN AN ORGANIZED HEALTH CARE EDUCATION/TRAINING PROGRAM

## 2021-11-15 PROCEDURE — 37000009 HC ANESTHESIA EA ADD 15 MINS: Performed by: THORACIC SURGERY (CARDIOTHORACIC VASCULAR SURGERY)

## 2021-11-15 PROCEDURE — 33518 CABG ARTERY-VEIN TWO: CPT | Mod: ,,, | Performed by: THORACIC SURGERY (CARDIOTHORACIC VASCULAR SURGERY)

## 2021-11-15 PROCEDURE — 80053 COMPREHEN METABOLIC PANEL: CPT | Performed by: STUDENT IN AN ORGANIZED HEALTH CARE EDUCATION/TRAINING PROGRAM

## 2021-11-15 PROCEDURE — 27800903 OPTIME MED/SURG SUP & DEVICES OTHER IMPLANTS: Performed by: THORACIC SURGERY (CARDIOTHORACIC VASCULAR SURGERY)

## 2021-11-15 PROCEDURE — 64450 NJX AA&/STRD OTHER PN/BRANCH: CPT | Mod: 59,50,, | Performed by: ANESTHESIOLOGY

## 2021-11-15 PROCEDURE — 27000221 HC OXYGEN, UP TO 24 HOURS

## 2021-11-15 PROCEDURE — 93005 ELECTROCARDIOGRAM TRACING: CPT

## 2021-11-15 PROCEDURE — 33508 ENDOSCOPIC VEIN HARVEST: CPT | Mod: 59,,, | Performed by: THORACIC SURGERY (CARDIOTHORACIC VASCULAR SURGERY)

## 2021-11-15 PROCEDURE — 93010 EKG 12-LEAD: ICD-10-PCS | Mod: ,,, | Performed by: INTERNAL MEDICINE

## 2021-11-15 PROCEDURE — 36592 COLLECT BLOOD FROM PICC: CPT

## 2021-11-15 PROCEDURE — 93010 ELECTROCARDIOGRAM REPORT: CPT | Mod: ,,, | Performed by: INTERNAL MEDICINE

## 2021-11-15 PROCEDURE — 20000000 HC ICU ROOM

## 2021-11-15 RX ORDER — BUPIVACAINE HYDROCHLORIDE 7.5 MG/ML
INJECTION, SOLUTION EPIDURAL; RETROBULBAR
Status: COMPLETED | OUTPATIENT
Start: 2021-11-15 | End: 2021-11-15

## 2021-11-15 RX ORDER — ROCURONIUM BROMIDE 10 MG/ML
INJECTION, SOLUTION INTRAVENOUS
Status: DISCONTINUED | OUTPATIENT
Start: 2021-11-15 | End: 2021-11-15

## 2021-11-15 RX ORDER — HYDROMORPHONE HYDROCHLORIDE 1 MG/ML
1 INJECTION, SOLUTION INTRAMUSCULAR; INTRAVENOUS; SUBCUTANEOUS EVERY 4 HOURS PRN
Status: DISCONTINUED | OUTPATIENT
Start: 2021-11-15 | End: 2021-11-17

## 2021-11-15 RX ORDER — NOREPINEPHRINE BITARTRATE 1 MG/ML
INJECTION, SOLUTION INTRAVENOUS CONTINUOUS PRN
Status: DISCONTINUED | OUTPATIENT
Start: 2021-11-15 | End: 2021-11-15

## 2021-11-15 RX ORDER — SCOLOPAMINE TRANSDERMAL SYSTEM 1 MG/1
1 PATCH, EXTENDED RELEASE TRANSDERMAL
Status: COMPLETED | OUTPATIENT
Start: 2021-11-15 | End: 2021-11-18

## 2021-11-15 RX ORDER — PAPAVERINE HYDROCHLORIDE 30 MG/ML
INJECTION INTRAMUSCULAR; INTRAVENOUS
Status: DISCONTINUED | OUTPATIENT
Start: 2021-11-15 | End: 2021-11-15

## 2021-11-15 RX ORDER — MAGNESIUM SULFATE HEPTAHYDRATE 40 MG/ML
2 INJECTION, SOLUTION INTRAVENOUS
Status: DISCONTINUED | OUTPATIENT
Start: 2021-11-15 | End: 2021-11-19

## 2021-11-15 RX ORDER — MUPIROCIN 20 MG/G
OINTMENT TOPICAL 2 TIMES DAILY
Status: COMPLETED | OUTPATIENT
Start: 2021-11-15 | End: 2021-11-20

## 2021-11-15 RX ORDER — NITROGLYCERIN 5 MG/ML
INJECTION, SOLUTION INTRAVENOUS
Status: DISCONTINUED | OUTPATIENT
Start: 2021-11-15 | End: 2021-11-15

## 2021-11-15 RX ORDER — ATORVASTATIN CALCIUM 20 MG/1
20 TABLET, FILM COATED ORAL DAILY
Status: DISCONTINUED | OUTPATIENT
Start: 2021-11-16 | End: 2021-11-21 | Stop reason: HOSPADM

## 2021-11-15 RX ORDER — ACETAMINOPHEN 500 MG
1000 TABLET ORAL EVERY 8 HOURS
Status: DISCONTINUED | OUTPATIENT
Start: 2021-11-15 | End: 2021-11-17

## 2021-11-15 RX ORDER — HEPARIN SODIUM 1000 [USP'U]/ML
INJECTION, SOLUTION INTRAVENOUS; SUBCUTANEOUS
Status: DISCONTINUED | OUTPATIENT
Start: 2021-11-15 | End: 2021-11-15

## 2021-11-15 RX ORDER — SODIUM CHLORIDE 9 MG/ML
INJECTION, SOLUTION INTRAVENOUS CONTINUOUS
Status: DISCONTINUED | OUTPATIENT
Start: 2021-11-15 | End: 2021-11-15

## 2021-11-15 RX ORDER — CLINDAMYCIN PHOSPHATE 900 MG/50ML
900 INJECTION, SOLUTION INTRAVENOUS
Status: COMPLETED | OUTPATIENT
Start: 2021-11-15 | End: 2021-11-16

## 2021-11-15 RX ORDER — MAGNESIUM SULFATE HEPTAHYDRATE 40 MG/ML
4 INJECTION, SOLUTION INTRAVENOUS
Status: DISCONTINUED | OUTPATIENT
Start: 2021-11-15 | End: 2021-11-19

## 2021-11-15 RX ORDER — FENTANYL CITRATE 50 UG/ML
INJECTION, SOLUTION INTRAMUSCULAR; INTRAVENOUS
Status: DISCONTINUED | OUTPATIENT
Start: 2021-11-15 | End: 2021-11-15

## 2021-11-15 RX ORDER — LIDOCAINE HYDROCHLORIDE 20 MG/ML
INJECTION INTRAVENOUS
Status: DISCONTINUED | OUTPATIENT
Start: 2021-11-15 | End: 2021-11-15

## 2021-11-15 RX ORDER — NOREPINEPHRINE BITARTRATE 1 MG/ML
INJECTION, SOLUTION INTRAVENOUS
Status: DISCONTINUED | OUTPATIENT
Start: 2021-11-15 | End: 2021-11-15

## 2021-11-15 RX ORDER — POTASSIUM CHLORIDE 29.8 MG/ML
40 INJECTION INTRAVENOUS
Status: DISCONTINUED | OUTPATIENT
Start: 2021-11-15 | End: 2021-11-19

## 2021-11-15 RX ORDER — PROTAMINE SULFATE 10 MG/ML
INJECTION, SOLUTION INTRAVENOUS
Status: DISCONTINUED | OUTPATIENT
Start: 2021-11-15 | End: 2021-11-15

## 2021-11-15 RX ORDER — METOPROLOL TARTRATE 25 MG/1
25 TABLET, FILM COATED ORAL
Status: COMPLETED | OUTPATIENT
Start: 2021-11-15 | End: 2021-11-15

## 2021-11-15 RX ORDER — ASPIRIN 325 MG
325 TABLET ORAL ONCE
Status: COMPLETED | OUTPATIENT
Start: 2021-11-15 | End: 2021-11-15

## 2021-11-15 RX ORDER — LEVOTHYROXINE SODIUM 112 UG/1
112 TABLET ORAL
Status: DISCONTINUED | OUTPATIENT
Start: 2021-11-16 | End: 2021-11-21 | Stop reason: HOSPADM

## 2021-11-15 RX ORDER — POTASSIUM CHLORIDE 14.9 MG/ML
40 INJECTION INTRAVENOUS
Status: DISCONTINUED | OUTPATIENT
Start: 2021-11-15 | End: 2021-11-19

## 2021-11-15 RX ORDER — CALCIUM CHLORIDE INJECTION 100 MG/ML
INJECTION, SOLUTION INTRAVENOUS
Status: DISCONTINUED | OUTPATIENT
Start: 2021-11-15 | End: 2021-11-15

## 2021-11-15 RX ORDER — CLINDAMYCIN PHOSPHATE 900 MG/50ML
900 INJECTION, SOLUTION INTRAVENOUS
Status: DISCONTINUED | OUTPATIENT
Start: 2021-11-15 | End: 2021-11-15

## 2021-11-15 RX ORDER — PROPOFOL 10 MG/ML
0-50 INJECTION, EMULSION INTRAVENOUS CONTINUOUS
Status: DISCONTINUED | OUTPATIENT
Start: 2021-11-15 | End: 2021-11-16

## 2021-11-15 RX ORDER — PROPOFOL 10 MG/ML
VIAL (ML) INTRAVENOUS
Status: DISCONTINUED | OUTPATIENT
Start: 2021-11-15 | End: 2021-11-15

## 2021-11-15 RX ORDER — ALBUMIN HUMAN 50 G/1000ML
25 SOLUTION INTRAVENOUS ONCE
Status: COMPLETED | OUTPATIENT
Start: 2021-11-15 | End: 2021-11-15

## 2021-11-15 RX ORDER — POLYETHYLENE GLYCOL 3350 17 G/17G
17 POWDER, FOR SOLUTION ORAL DAILY
Status: DISCONTINUED | OUTPATIENT
Start: 2021-11-16 | End: 2021-11-21 | Stop reason: HOSPADM

## 2021-11-15 RX ORDER — ASCORBIC ACID 500 MG
500 TABLET ORAL 2 TIMES DAILY
Status: DISCONTINUED | OUTPATIENT
Start: 2021-11-15 | End: 2021-11-17

## 2021-11-15 RX ORDER — MUPIROCIN 20 MG/G
1 OINTMENT TOPICAL
Status: COMPLETED | OUTPATIENT
Start: 2021-11-15 | End: 2021-11-15

## 2021-11-15 RX ORDER — ONDANSETRON 2 MG/ML
4 INJECTION INTRAMUSCULAR; INTRAVENOUS EVERY 12 HOURS PRN
Status: DISCONTINUED | OUTPATIENT
Start: 2021-11-15 | End: 2021-11-18

## 2021-11-15 RX ORDER — POTASSIUM CHLORIDE 14.9 MG/ML
20 INJECTION INTRAVENOUS
Status: DISCONTINUED | OUTPATIENT
Start: 2021-11-15 | End: 2021-11-19

## 2021-11-15 RX ORDER — LIDOCAINE HYDROCHLORIDE 10 MG/ML
1 INJECTION, SOLUTION EPIDURAL; INFILTRATION; INTRACAUDAL; PERINEURAL
Status: DISCONTINUED | OUTPATIENT
Start: 2021-11-15 | End: 2021-11-15 | Stop reason: HOSPADM

## 2021-11-15 RX ORDER — HYDROMORPHONE HYDROCHLORIDE 1 MG/ML
2 INJECTION, SOLUTION INTRAMUSCULAR; INTRAVENOUS; SUBCUTANEOUS EVERY 4 HOURS PRN
Status: DISCONTINUED | OUTPATIENT
Start: 2021-11-15 | End: 2021-11-17

## 2021-11-15 RX ORDER — VASOPRESSIN 20 [USP'U]/ML
INJECTION, SOLUTION INTRAMUSCULAR; SUBCUTANEOUS
Status: DISCONTINUED | OUTPATIENT
Start: 2021-11-15 | End: 2021-11-15

## 2021-11-15 RX ORDER — PROPOFOL 10 MG/ML
VIAL (ML) INTRAVENOUS CONTINUOUS PRN
Status: DISCONTINUED | OUTPATIENT
Start: 2021-11-15 | End: 2021-11-15

## 2021-11-15 RX ORDER — TRANEXAMIC ACID 100 MG/ML
INJECTION, SOLUTION INTRAVENOUS CONTINUOUS PRN
Status: DISCONTINUED | OUTPATIENT
Start: 2021-11-15 | End: 2021-11-15

## 2021-11-15 RX ORDER — MIDAZOLAM HYDROCHLORIDE 1 MG/ML
INJECTION INTRAMUSCULAR; INTRAVENOUS
Status: DISCONTINUED | OUTPATIENT
Start: 2021-11-15 | End: 2021-11-15

## 2021-11-15 RX ORDER — EPINEPHRINE 1 MG/ML
INJECTION, SOLUTION INTRACARDIAC; INTRAMUSCULAR; INTRAVENOUS; SUBCUTANEOUS
Status: DISCONTINUED | OUTPATIENT
Start: 2021-11-15 | End: 2021-11-15

## 2021-11-15 RX ORDER — FAMOTIDINE 10 MG/ML
20 INJECTION INTRAVENOUS 2 TIMES DAILY
Status: DISCONTINUED | OUTPATIENT
Start: 2021-11-15 | End: 2021-11-17

## 2021-11-15 RX ORDER — ACETAMINOPHEN 500 MG
1000 TABLET ORAL
Status: COMPLETED | OUTPATIENT
Start: 2021-11-15 | End: 2021-11-15

## 2021-11-15 RX ORDER — HEPARIN SODIUM 200 [USP'U]/100ML
INJECTION, SOLUTION INTRAVENOUS
Status: DISCONTINUED | OUTPATIENT
Start: 2021-11-15 | End: 2021-11-15

## 2021-11-15 RX ORDER — SODIUM,POTASSIUM PHOSPHATES 280-250MG
2 POWDER IN PACKET (EA) ORAL ONCE
Status: COMPLETED | OUTPATIENT
Start: 2021-11-15 | End: 2021-11-15

## 2021-11-15 RX ORDER — KETAMINE HCL IN 0.9 % NACL 50 MG/5 ML
SYRINGE (ML) INTRAVENOUS
Status: DISCONTINUED | OUTPATIENT
Start: 2021-11-15 | End: 2021-11-15

## 2021-11-15 RX ORDER — ASPIRIN 325 MG
325 TABLET ORAL DAILY
Status: DISCONTINUED | OUTPATIENT
Start: 2021-11-16 | End: 2021-11-21 | Stop reason: HOSPADM

## 2021-11-15 RX ADMIN — PROTAMINE SULFATE 210 MG: 10 INJECTION, SOLUTION INTRAVENOUS at 06:11

## 2021-11-15 RX ADMIN — CLEVIPIDINE 2 MG/HR: 0.5 EMULSION INTRAVENOUS at 02:11

## 2021-11-15 RX ADMIN — FENTANYL CITRATE 50 MCG: 50 INJECTION INTRAMUSCULAR; INTRAVENOUS at 01:11

## 2021-11-15 RX ADMIN — CLINDAMYCIN IN 5 PERCENT DEXTROSE 900 MG: 18 INJECTION, SOLUTION INTRAVENOUS at 07:11

## 2021-11-15 RX ADMIN — Medication 50 MCG/KG/MIN: at 06:11

## 2021-11-15 RX ADMIN — NOREPINEPHRINE BITARTRATE 8 MCG: 1 INJECTION, SOLUTION, CONCENTRATE INTRAVENOUS at 01:11

## 2021-11-15 RX ADMIN — MUPIROCIN: 20 OINTMENT TOPICAL at 09:11

## 2021-11-15 RX ADMIN — EPINEPHRINE 20 MCG: 1 INJECTION, SOLUTION INTRAMUSCULAR; SUBCUTANEOUS at 06:11

## 2021-11-15 RX ADMIN — PROPOFOL 40 MCG/KG/MIN: 10 INJECTION, EMULSION INTRAVENOUS at 10:11

## 2021-11-15 RX ADMIN — ROCURONIUM BROMIDE 80 MG: 10 INJECTION, SOLUTION INTRAVENOUS at 01:11

## 2021-11-15 RX ADMIN — ACETAMINOPHEN 1000 MG: 500 TABLET ORAL at 09:11

## 2021-11-15 RX ADMIN — POTASSIUM & SODIUM PHOSPHATES POWDER PACK 280-160-250 MG 2 PACKET: 280-160-250 PACK at 09:11

## 2021-11-15 RX ADMIN — NOREPINEPHRINE BITARTRATE 4 MCG: 1 INJECTION, SOLUTION, CONCENTRATE INTRAVENOUS at 04:11

## 2021-11-15 RX ADMIN — HYDROMORPHONE HYDROCHLORIDE 1 MG: 1 INJECTION, SOLUTION INTRAMUSCULAR; INTRAVENOUS; SUBCUTANEOUS at 07:11

## 2021-11-15 RX ADMIN — NITROGLYCERIN 50 MCG: 5 INJECTION, SOLUTION INTRAVENOUS at 03:11

## 2021-11-15 RX ADMIN — EPINEPHRINE 0.02 MCG/KG/MIN: 1 INJECTION INTRAMUSCULAR; INTRAVENOUS; SUBCUTANEOUS at 06:11

## 2021-11-15 RX ADMIN — NITROGLYCERIN 50 MCG: 5 INJECTION, SOLUTION INTRAVENOUS at 02:11

## 2021-11-15 RX ADMIN — FENTANYL CITRATE 100 MCG: 50 INJECTION INTRAMUSCULAR; INTRAVENOUS at 02:11

## 2021-11-15 RX ADMIN — Medication 30 MG: at 02:11

## 2021-11-15 RX ADMIN — ALBUMIN (HUMAN) 25 G: 12.5 SOLUTION INTRAVENOUS at 07:11

## 2021-11-15 RX ADMIN — VASOPRESSIN 2 UNITS: 20 INJECTION INTRAVENOUS at 04:11

## 2021-11-15 RX ADMIN — ROCURONIUM BROMIDE 30 MG: 10 INJECTION, SOLUTION INTRAVENOUS at 05:11

## 2021-11-15 RX ADMIN — MUPIROCIN 1 G: 20 OINTMENT TOPICAL at 09:11

## 2021-11-15 RX ADMIN — METOPROLOL TARTRATE 25 MG: 25 TABLET, FILM COATED ORAL at 09:11

## 2021-11-15 RX ADMIN — ROCURONIUM BROMIDE 20 MG: 10 INJECTION, SOLUTION INTRAVENOUS at 02:11

## 2021-11-15 RX ADMIN — MIDAZOLAM HYDROCHLORIDE 2 MG: 1 INJECTION, SOLUTION INTRAMUSCULAR; INTRAVENOUS at 01:11

## 2021-11-15 RX ADMIN — NITROGLYCERIN 50 MCG: 5 INJECTION, SOLUTION INTRAVENOUS at 04:11

## 2021-11-15 RX ADMIN — VASOPRESSIN 1 UNITS: 20 INJECTION INTRAVENOUS at 04:11

## 2021-11-15 RX ADMIN — SODIUM CHLORIDE 1 MG/KG/HR: 9 INJECTION, SOLUTION INTRAVENOUS at 02:11

## 2021-11-15 RX ADMIN — FAMOTIDINE 20 MG: 10 INJECTION INTRAVENOUS at 09:11

## 2021-11-15 RX ADMIN — HEPARIN SODIUM 21000 UNITS: 1000 INJECTION, SOLUTION INTRAVENOUS; SUBCUTANEOUS at 03:11

## 2021-11-15 RX ADMIN — NITROGLYCERIN 100 MCG: 5 INJECTION, SOLUTION INTRAVENOUS at 03:11

## 2021-11-15 RX ADMIN — NOREPINEPHRINE BITARTRATE 0.05 MCG/KG/MIN: 1 INJECTION, SOLUTION INTRAVENOUS at 04:11

## 2021-11-15 RX ADMIN — PROPOFOL 30 MG: 10 INJECTION, EMULSION INTRAVENOUS at 03:11

## 2021-11-15 RX ADMIN — SODIUM CHLORIDE: 0.9 INJECTION, SOLUTION INTRAVENOUS at 09:11

## 2021-11-15 RX ADMIN — INSULIN HUMAN 1.5 UNITS/HR: 1 INJECTION, SOLUTION INTRAVENOUS at 08:11

## 2021-11-15 RX ADMIN — ROCURONIUM BROMIDE 40 MG: 10 INJECTION, SOLUTION INTRAVENOUS at 03:11

## 2021-11-15 RX ADMIN — BUPIVACAINE HYDROCHLORIDE 30 ML: 7.5 INJECTION, SOLUTION EPIDURAL; RETROBULBAR at 01:11

## 2021-11-15 RX ADMIN — LIDOCAINE HYDROCHLORIDE 100 MG: 20 INJECTION, SOLUTION INTRAVENOUS at 06:11

## 2021-11-15 RX ADMIN — CLEVIPIDINE 4 MG/HR: 0.5 EMULSION INTRAVENOUS at 10:11

## 2021-11-15 RX ADMIN — PROPOFOL 50 MG: 10 INJECTION, EMULSION INTRAVENOUS at 01:11

## 2021-11-15 RX ADMIN — INSULIN HUMAN 5 UNITS/HR: 1 INJECTION, SOLUTION INTRAVENOUS at 04:11

## 2021-11-15 RX ADMIN — SODIUM CHLORIDE, SODIUM GLUCONATE, SODIUM ACETATE, POTASSIUM CHLORIDE, MAGNESIUM CHLORIDE, SODIUM PHOSPHATE, DIBASIC, AND POTASSIUM PHOSPHATE: .53; .5; .37; .037; .03; .012; .00082 INJECTION, SOLUTION INTRAVENOUS at 02:11

## 2021-11-15 RX ADMIN — ONDANSETRON 1 G: 2 INJECTION INTRAMUSCULAR; INTRAVENOUS at 06:11

## 2021-11-15 RX ADMIN — HEPARIN SODIUM 10000 UNITS: 1000 INJECTION, SOLUTION INTRAVENOUS; SUBCUTANEOUS at 03:11

## 2021-11-15 RX ADMIN — PROPOFOL 20 MG: 10 INJECTION, EMULSION INTRAVENOUS at 01:11

## 2021-11-15 RX ADMIN — OXYCODONE HYDROCHLORIDE AND ACETAMINOPHEN 500 MG: 500 TABLET ORAL at 09:11

## 2021-11-15 RX ADMIN — SCOPALAMINE 1 PATCH: 1 PATCH, EXTENDED RELEASE TRANSDERMAL at 09:11

## 2021-11-15 RX ADMIN — CLEVIPIDINE 3 MG/HR: 0.5 EMULSION INTRAVENOUS at 08:11

## 2021-11-15 RX ADMIN — FENTANYL CITRATE 100 MCG: 50 INJECTION INTRAMUSCULAR; INTRAVENOUS at 01:11

## 2021-11-15 RX ADMIN — Medication 20 MG: at 01:11

## 2021-11-15 RX ADMIN — EPINEPHRINE 20 MCG: 1 INJECTION, SOLUTION INTRAMUSCULAR; SUBCUTANEOUS at 02:11

## 2021-11-15 RX ADMIN — LIDOCAINE HYDROCHLORIDE 80 MG: 20 INJECTION, SOLUTION INTRAVENOUS at 01:11

## 2021-11-15 RX ADMIN — ASPIRIN 325 MG ORAL TABLET 325 MG: 325 PILL ORAL at 10:11

## 2021-11-16 DIAGNOSIS — Z95.1 S/P CABG X 3: ICD-10-CM

## 2021-11-16 DIAGNOSIS — Z95.1 S/P CABG (CORONARY ARTERY BYPASS GRAFT): Primary | ICD-10-CM

## 2021-11-16 PROBLEM — R73.9 STRESS HYPERGLYCEMIA: Status: ACTIVE | Noted: 2021-11-16

## 2021-11-16 LAB
ALBUMIN SERPL BCP-MCNC: 3.3 G/DL (ref 3.5–5.2)
ALBUMIN SERPL BCP-MCNC: 3.5 G/DL (ref 3.5–5.2)
ALLENS TEST: ABNORMAL
ALLENS TEST: NORMAL
ALP SERPL-CCNC: 107 U/L (ref 55–135)
ALP SERPL-CCNC: 114 U/L (ref 55–135)
ALT SERPL W/O P-5'-P-CCNC: 33 U/L (ref 10–44)
ALT SERPL W/O P-5'-P-CCNC: 37 U/L (ref 10–44)
ANION GAP SERPL CALC-SCNC: 10 MMOL/L (ref 8–16)
ANION GAP SERPL CALC-SCNC: 8 MMOL/L (ref 8–16)
APTT BLDCRRT: 24.9 SEC (ref 21–32)
APTT BLDCRRT: 26.4 SEC (ref 21–32)
AST SERPL-CCNC: 42 U/L (ref 10–40)
AST SERPL-CCNC: 53 U/L (ref 10–40)
BASOPHILS # BLD AUTO: 0.02 K/UL (ref 0–0.2)
BASOPHILS # BLD AUTO: 0.02 K/UL (ref 0–0.2)
BASOPHILS NFR BLD: 0.1 % (ref 0–1.9)
BASOPHILS NFR BLD: 0.2 % (ref 0–1.9)
BILIRUB SERPL-MCNC: 0.7 MG/DL (ref 0.1–1)
BILIRUB SERPL-MCNC: 1 MG/DL (ref 0.1–1)
BUN SERPL-MCNC: 10 MG/DL (ref 6–20)
BUN SERPL-MCNC: 11 MG/DL (ref 6–20)
CA-I BLDV-SCNC: 1.17 MMOL/L (ref 1.06–1.42)
CALCIUM SERPL-MCNC: 7.9 MG/DL (ref 8.7–10.5)
CALCIUM SERPL-MCNC: 8.1 MG/DL (ref 8.7–10.5)
CHLORIDE SERPL-SCNC: 111 MMOL/L (ref 95–110)
CHLORIDE SERPL-SCNC: 111 MMOL/L (ref 95–110)
CO2 SERPL-SCNC: 21 MMOL/L (ref 23–29)
CO2 SERPL-SCNC: 22 MMOL/L (ref 23–29)
CREAT SERPL-MCNC: 0.5 MG/DL (ref 0.5–1.4)
CREAT SERPL-MCNC: 0.5 MG/DL (ref 0.5–1.4)
DELSYS: NORMAL
DIFFERENTIAL METHOD: ABNORMAL
DIFFERENTIAL METHOD: ABNORMAL
EOSINOPHIL # BLD AUTO: 0 K/UL (ref 0–0.5)
EOSINOPHIL # BLD AUTO: 0 K/UL (ref 0–0.5)
EOSINOPHIL NFR BLD: 0 % (ref 0–8)
EOSINOPHIL NFR BLD: 0 % (ref 0–8)
ERYTHROCYTE [DISTWIDTH] IN BLOOD BY AUTOMATED COUNT: 12.9 % (ref 11.5–14.5)
ERYTHROCYTE [DISTWIDTH] IN BLOOD BY AUTOMATED COUNT: 13 % (ref 11.5–14.5)
EST. GFR  (AFRICAN AMERICAN): >60 ML/MIN/1.73 M^2
EST. GFR  (AFRICAN AMERICAN): >60 ML/MIN/1.73 M^2
EST. GFR  (NON AFRICAN AMERICAN): >60 ML/MIN/1.73 M^2
EST. GFR  (NON AFRICAN AMERICAN): >60 ML/MIN/1.73 M^2
FIBRINOGEN PPP-MCNC: 299 MG/DL (ref 182–400)
FIO2: 30
GLUCOSE SERPL-MCNC: 135 MG/DL (ref 70–110)
GLUCOSE SERPL-MCNC: 139 MG/DL (ref 70–110)
GLUCOSE SERPL-MCNC: 241 MG/DL (ref 70–110)
HCO3 UR-SCNC: 21 MMOL/L (ref 24–28)
HCO3 UR-SCNC: 23.5 MMOL/L (ref 24–28)
HCO3 UR-SCNC: 23.9 MMOL/L (ref 24–28)
HCO3 UR-SCNC: 25.3 MMOL/L (ref 24–28)
HCO3 UR-SCNC: 25.8 MMOL/L (ref 24–28)
HCT VFR BLD AUTO: 28 % (ref 37–48.5)
HCT VFR BLD AUTO: 30.2 % (ref 37–48.5)
HCT VFR BLD CALC: 23 %PCV (ref 36–54)
HCT VFR BLD CALC: 25 %PCV (ref 36–54)
HCT VFR BLD CALC: 29 %PCV (ref 36–54)
HGB BLD-MCNC: 9.3 G/DL (ref 12–16)
HGB BLD-MCNC: 9.8 G/DL (ref 12–16)
IMM GRANULOCYTES # BLD AUTO: 0.04 K/UL (ref 0–0.04)
IMM GRANULOCYTES # BLD AUTO: 0.06 K/UL (ref 0–0.04)
IMM GRANULOCYTES NFR BLD AUTO: 0.3 % (ref 0–0.5)
IMM GRANULOCYTES NFR BLD AUTO: 0.4 % (ref 0–0.5)
INR PPP: 1 (ref 0.8–1.2)
INR PPP: 1 (ref 0.8–1.2)
LDH SERPL L TO P-CCNC: 0.93 MMOL/L (ref 0.36–1.25)
LDH SERPL L TO P-CCNC: 1.04 MMOL/L (ref 0.36–1.25)
LYMPHOCYTES # BLD AUTO: 0.6 K/UL (ref 1–4.8)
LYMPHOCYTES # BLD AUTO: 0.9 K/UL (ref 1–4.8)
LYMPHOCYTES NFR BLD: 4.4 % (ref 18–48)
LYMPHOCYTES NFR BLD: 7.8 % (ref 18–48)
MAGNESIUM SERPL-MCNC: 2.1 MG/DL (ref 1.6–2.6)
MCH RBC QN AUTO: 27.8 PG (ref 27–31)
MCH RBC QN AUTO: 28.6 PG (ref 27–31)
MCHC RBC AUTO-ENTMCNC: 32.5 G/DL (ref 32–36)
MCHC RBC AUTO-ENTMCNC: 33.2 G/DL (ref 32–36)
MCV RBC AUTO: 86 FL (ref 82–98)
MCV RBC AUTO: 86 FL (ref 82–98)
MODE: NORMAL
MONOCYTES # BLD AUTO: 0.7 K/UL (ref 0.3–1)
MONOCYTES # BLD AUTO: 0.7 K/UL (ref 0.3–1)
MONOCYTES NFR BLD: 4.6 % (ref 4–15)
MONOCYTES NFR BLD: 5.7 % (ref 4–15)
NEUTROPHILS # BLD AUTO: 12.7 K/UL (ref 1.8–7.7)
NEUTROPHILS # BLD AUTO: 9.9 K/UL (ref 1.8–7.7)
NEUTROPHILS NFR BLD: 86 % (ref 38–73)
NEUTROPHILS NFR BLD: 90.5 % (ref 38–73)
NRBC BLD-RTO: 0 /100 WBC
NRBC BLD-RTO: 0 /100 WBC
PCO2 BLDA: 32.7 MMHG (ref 35–45)
PCO2 BLDA: 37.2 MMHG (ref 35–45)
PCO2 BLDA: 38 MMHG (ref 35–45)
PCO2 BLDA: 39.3 MMHG (ref 35–45)
PCO2 BLDA: 44.7 MMHG (ref 35–45)
PEEP: 5
PH SMN: 7.33 [PH] (ref 7.35–7.45)
PH SMN: 7.41 [PH] (ref 7.35–7.45)
PH SMN: 7.42 [PH] (ref 7.35–7.45)
PH SMN: 7.42 [PH] (ref 7.35–7.45)
PH SMN: 7.43 [PH] (ref 7.35–7.45)
PHOSPHATE SERPL-MCNC: 4 MG/DL (ref 2.7–4.5)
PLATELET # BLD AUTO: 194 K/UL (ref 150–450)
PLATELET # BLD AUTO: 196 K/UL (ref 150–450)
PMV BLD AUTO: 10.7 FL (ref 9.2–12.9)
PMV BLD AUTO: 10.8 FL (ref 9.2–12.9)
PO2 BLDA: 100 MMHG (ref 80–100)
PO2 BLDA: 106 MMHG (ref 80–100)
PO2 BLDA: 118 MMHG (ref 80–100)
PO2 BLDA: 331 MMHG (ref 80–100)
PO2 BLDA: 80 MMHG (ref 80–100)
POC BE: -1 MMOL/L
POC BE: -2 MMOL/L
POC BE: -4 MMOL/L
POC BE: 1 MMOL/L
POC BE: 1 MMOL/L
POC IONIZED CALCIUM: 0.99 MMOL/L (ref 1.06–1.42)
POC IONIZED CALCIUM: 1.09 MMOL/L (ref 1.06–1.42)
POC IONIZED CALCIUM: 1.21 MMOL/L (ref 1.06–1.42)
POC SATURATED O2: 100 % (ref 95–100)
POC SATURATED O2: 96 % (ref 95–100)
POC SATURATED O2: 98 % (ref 95–100)
POC TCO2: 22 MMOL/L (ref 23–27)
POC TCO2: 25 MMOL/L (ref 23–27)
POC TCO2: 25 MMOL/L (ref 23–27)
POC TCO2: 26 MMOL/L (ref 23–27)
POC TCO2: 27 MMOL/L (ref 23–27)
POCT GLUCOSE: 106 MG/DL (ref 70–110)
POCT GLUCOSE: 110 MG/DL (ref 70–110)
POCT GLUCOSE: 115 MG/DL (ref 70–110)
POCT GLUCOSE: 117 MG/DL (ref 70–110)
POCT GLUCOSE: 119 MG/DL (ref 70–110)
POCT GLUCOSE: 119 MG/DL (ref 70–110)
POCT GLUCOSE: 120 MG/DL (ref 70–110)
POCT GLUCOSE: 124 MG/DL (ref 70–110)
POCT GLUCOSE: 126 MG/DL (ref 70–110)
POCT GLUCOSE: 129 MG/DL (ref 70–110)
POCT GLUCOSE: 134 MG/DL (ref 70–110)
POCT GLUCOSE: 136 MG/DL (ref 70–110)
POCT GLUCOSE: 140 MG/DL (ref 70–110)
POCT GLUCOSE: 146 MG/DL (ref 70–110)
POCT GLUCOSE: 147 MG/DL (ref 70–110)
POTASSIUM BLD-SCNC: 3.6 MMOL/L (ref 3.5–5.1)
POTASSIUM BLD-SCNC: 3.7 MMOL/L (ref 3.5–5.1)
POTASSIUM BLD-SCNC: 5.8 MMOL/L (ref 3.5–5.1)
POTASSIUM SERPL-SCNC: 3.8 MMOL/L (ref 3.5–5.1)
POTASSIUM SERPL-SCNC: 3.8 MMOL/L (ref 3.5–5.1)
PROT SERPL-MCNC: 4.9 G/DL (ref 6–8.4)
PROT SERPL-MCNC: 5.1 G/DL (ref 6–8.4)
PROTHROMBIN TIME: 10.7 SEC (ref 9–12.5)
PROTHROMBIN TIME: 10.9 SEC (ref 9–12.5)
PS: 5
RBC # BLD AUTO: 3.25 M/UL (ref 4–5.4)
RBC # BLD AUTO: 3.52 M/UL (ref 4–5.4)
SAMPLE: ABNORMAL
SAMPLE: NORMAL
SITE: ABNORMAL
SITE: NORMAL
SODIUM BLD-SCNC: 138 MMOL/L (ref 136–145)
SODIUM BLD-SCNC: 145 MMOL/L (ref 136–145)
SODIUM BLD-SCNC: 147 MMOL/L (ref 136–145)
SODIUM SERPL-SCNC: 140 MMOL/L (ref 136–145)
SODIUM SERPL-SCNC: 143 MMOL/L (ref 136–145)
SP02: 98
WBC # BLD AUTO: 11.47 K/UL (ref 3.9–12.7)
WBC # BLD AUTO: 14.04 K/UL (ref 3.9–12.7)

## 2021-11-16 PROCEDURE — 84295 ASSAY OF SERUM SODIUM: CPT

## 2021-11-16 PROCEDURE — 63600175 PHARM REV CODE 636 W HCPCS: Performed by: STUDENT IN AN ORGANIZED HEALTH CARE EDUCATION/TRAINING PROGRAM

## 2021-11-16 PROCEDURE — 85730 THROMBOPLASTIN TIME PARTIAL: CPT | Mod: 91 | Performed by: STUDENT IN AN ORGANIZED HEALTH CARE EDUCATION/TRAINING PROGRAM

## 2021-11-16 PROCEDURE — 97530 THERAPEUTIC ACTIVITIES: CPT

## 2021-11-16 PROCEDURE — 83735 ASSAY OF MAGNESIUM: CPT | Performed by: STUDENT IN AN ORGANIZED HEALTH CARE EDUCATION/TRAINING PROGRAM

## 2021-11-16 PROCEDURE — 85025 COMPLETE CBC W/AUTO DIFF WBC: CPT | Performed by: STUDENT IN AN ORGANIZED HEALTH CARE EDUCATION/TRAINING PROGRAM

## 2021-11-16 PROCEDURE — 94761 N-INVAS EAR/PLS OXIMETRY MLT: CPT

## 2021-11-16 PROCEDURE — 83605 ASSAY OF LACTIC ACID: CPT

## 2021-11-16 PROCEDURE — 82803 BLOOD GASES ANY COMBINATION: CPT

## 2021-11-16 PROCEDURE — 20000000 HC ICU ROOM

## 2021-11-16 PROCEDURE — 99499 NO LOS: ICD-10-PCS | Mod: ,,, | Performed by: ANESTHESIOLOGY

## 2021-11-16 PROCEDURE — 85014 HEMATOCRIT: CPT

## 2021-11-16 PROCEDURE — 85384 FIBRINOGEN ACTIVITY: CPT | Performed by: STUDENT IN AN ORGANIZED HEALTH CARE EDUCATION/TRAINING PROGRAM

## 2021-11-16 PROCEDURE — 99499 UNLISTED E&M SERVICE: CPT | Mod: ,,, | Performed by: ANESTHESIOLOGY

## 2021-11-16 PROCEDURE — 85610 PROTHROMBIN TIME: CPT | Performed by: STUDENT IN AN ORGANIZED HEALTH CARE EDUCATION/TRAINING PROGRAM

## 2021-11-16 PROCEDURE — 37799 UNLISTED PX VASCULAR SURGERY: CPT

## 2021-11-16 PROCEDURE — 94664 DEMO&/EVAL PT USE INHALER: CPT

## 2021-11-16 PROCEDURE — 25000003 PHARM REV CODE 250: Performed by: STUDENT IN AN ORGANIZED HEALTH CARE EDUCATION/TRAINING PROGRAM

## 2021-11-16 PROCEDURE — 84132 ASSAY OF SERUM POTASSIUM: CPT

## 2021-11-16 PROCEDURE — 97162 PT EVAL MOD COMPLEX 30 MIN: CPT

## 2021-11-16 PROCEDURE — 82330 ASSAY OF CALCIUM: CPT | Performed by: STUDENT IN AN ORGANIZED HEALTH CARE EDUCATION/TRAINING PROGRAM

## 2021-11-16 PROCEDURE — 94799 UNLISTED PULMONARY SVC/PX: CPT

## 2021-11-16 PROCEDURE — 99900035 HC TECH TIME PER 15 MIN (STAT)

## 2021-11-16 PROCEDURE — 80053 COMPREHEN METABOLIC PANEL: CPT | Mod: 91 | Performed by: STUDENT IN AN ORGANIZED HEALTH CARE EDUCATION/TRAINING PROGRAM

## 2021-11-16 PROCEDURE — 99900026 HC AIRWAY MAINTENANCE (STAT)

## 2021-11-16 PROCEDURE — 82800 BLOOD PH: CPT

## 2021-11-16 PROCEDURE — 85730 THROMBOPLASTIN TIME PARTIAL: CPT | Performed by: STUDENT IN AN ORGANIZED HEALTH CARE EDUCATION/TRAINING PROGRAM

## 2021-11-16 PROCEDURE — 97116 GAIT TRAINING THERAPY: CPT

## 2021-11-16 PROCEDURE — 27000221 HC OXYGEN, UP TO 24 HOURS

## 2021-11-16 PROCEDURE — 99223 1ST HOSP IP/OBS HIGH 75: CPT | Mod: ,,, | Performed by: NURSE PRACTITIONER

## 2021-11-16 PROCEDURE — 97165 OT EVAL LOW COMPLEX 30 MIN: CPT

## 2021-11-16 PROCEDURE — 80053 COMPREHEN METABOLIC PANEL: CPT | Performed by: STUDENT IN AN ORGANIZED HEALTH CARE EDUCATION/TRAINING PROGRAM

## 2021-11-16 PROCEDURE — 94003 VENT MGMT INPAT SUBQ DAY: CPT

## 2021-11-16 PROCEDURE — 84100 ASSAY OF PHOSPHORUS: CPT | Performed by: STUDENT IN AN ORGANIZED HEALTH CARE EDUCATION/TRAINING PROGRAM

## 2021-11-16 PROCEDURE — 82330 ASSAY OF CALCIUM: CPT

## 2021-11-16 PROCEDURE — 85610 PROTHROMBIN TIME: CPT | Mod: 91 | Performed by: STUDENT IN AN ORGANIZED HEALTH CARE EDUCATION/TRAINING PROGRAM

## 2021-11-16 PROCEDURE — 99223 PR INITIAL HOSPITAL CARE,LEVL III: ICD-10-PCS | Mod: ,,, | Performed by: NURSE PRACTITIONER

## 2021-11-16 PROCEDURE — 85025 COMPLETE CBC W/AUTO DIFF WBC: CPT | Mod: 91 | Performed by: STUDENT IN AN ORGANIZED HEALTH CARE EDUCATION/TRAINING PROGRAM

## 2021-11-16 RX ORDER — INSULIN ASPART 100 [IU]/ML
0-5 INJECTION, SOLUTION INTRAVENOUS; SUBCUTANEOUS
Status: DISCONTINUED | OUTPATIENT
Start: 2021-11-16 | End: 2021-11-19

## 2021-11-16 RX ORDER — IBUPROFEN 200 MG
24 TABLET ORAL
Status: DISCONTINUED | OUTPATIENT
Start: 2021-11-16 | End: 2021-11-19

## 2021-11-16 RX ORDER — SODIUM,POTASSIUM PHOSPHATES 280-250MG
2 POWDER IN PACKET (EA) ORAL ONCE
Status: COMPLETED | OUTPATIENT
Start: 2021-11-16 | End: 2021-11-16

## 2021-11-16 RX ORDER — GLUCAGON 1 MG
1 KIT INJECTION
Status: DISCONTINUED | OUTPATIENT
Start: 2021-11-16 | End: 2021-11-19

## 2021-11-16 RX ORDER — IBUPROFEN 200 MG
16 TABLET ORAL
Status: DISCONTINUED | OUTPATIENT
Start: 2021-11-16 | End: 2021-11-19

## 2021-11-16 RX ADMIN — ONDANSETRON 4 MG: 2 INJECTION INTRAMUSCULAR; INTRAVENOUS at 06:11

## 2021-11-16 RX ADMIN — ACETAMINOPHEN 1000 MG: 500 TABLET ORAL at 01:11

## 2021-11-16 RX ADMIN — CLINDAMYCIN IN 5 PERCENT DEXTROSE 900 MG: 18 INJECTION, SOLUTION INTRAVENOUS at 04:11

## 2021-11-16 RX ADMIN — HYDROMORPHONE HYDROCHLORIDE 1 MG: 1 INJECTION, SOLUTION INTRAMUSCULAR; INTRAVENOUS; SUBCUTANEOUS at 05:11

## 2021-11-16 RX ADMIN — OXYCODONE HYDROCHLORIDE AND ACETAMINOPHEN 500 MG: 500 TABLET ORAL at 09:11

## 2021-11-16 RX ADMIN — POTASSIUM & SODIUM PHOSPHATES POWDER PACK 280-160-250 MG 2 PACKET: 280-160-250 PACK at 02:11

## 2021-11-16 RX ADMIN — HYDROMORPHONE HYDROCHLORIDE 1 MG: 1 INJECTION, SOLUTION INTRAMUSCULAR; INTRAVENOUS; SUBCUTANEOUS at 01:11

## 2021-11-16 RX ADMIN — MUPIROCIN: 20 OINTMENT TOPICAL at 08:11

## 2021-11-16 RX ADMIN — ONDANSETRON 4 MG: 2 INJECTION INTRAMUSCULAR; INTRAVENOUS at 09:11

## 2021-11-16 RX ADMIN — OXYCODONE HYDROCHLORIDE AND ACETAMINOPHEN 500 MG: 500 TABLET ORAL at 08:11

## 2021-11-16 RX ADMIN — HYDROMORPHONE HYDROCHLORIDE 1 MG: 1 INJECTION, SOLUTION INTRAMUSCULAR; INTRAVENOUS; SUBCUTANEOUS at 06:11

## 2021-11-16 RX ADMIN — ACETAMINOPHEN 1000 MG: 500 TABLET ORAL at 09:11

## 2021-11-16 RX ADMIN — MUPIROCIN: 20 OINTMENT TOPICAL at 09:11

## 2021-11-16 RX ADMIN — FAMOTIDINE 20 MG: 10 INJECTION INTRAVENOUS at 10:11

## 2021-11-16 RX ADMIN — ASPIRIN 325 MG ORAL TABLET 325 MG: 325 PILL ORAL at 08:11

## 2021-11-16 RX ADMIN — POLYETHYLENE GLYCOL 3350 17 G: 17 POWDER, FOR SOLUTION ORAL at 08:11

## 2021-11-16 RX ADMIN — PROPOFOL 40 MCG/KG/MIN: 10 INJECTION, EMULSION INTRAVENOUS at 03:11

## 2021-11-16 RX ADMIN — ATORVASTATIN CALCIUM 20 MG: 20 TABLET, FILM COATED ORAL at 08:11

## 2021-11-16 RX ADMIN — LEVOTHYROXINE SODIUM 112 MCG: 112 TABLET ORAL at 06:11

## 2021-11-16 RX ADMIN — ACETAMINOPHEN 1000 MG: 500 TABLET ORAL at 06:11

## 2021-11-16 RX ADMIN — CLINDAMYCIN IN 5 PERCENT DEXTROSE 900 MG: 18 INJECTION, SOLUTION INTRAVENOUS at 11:11

## 2021-11-16 RX ADMIN — CALCIUM GLUCONATE 3 G: 98 INJECTION, SOLUTION INTRAVENOUS at 09:11

## 2021-11-16 RX ADMIN — HYDROMORPHONE HYDROCHLORIDE 1 MG: 1 INJECTION, SOLUTION INTRAMUSCULAR; INTRAVENOUS; SUBCUTANEOUS at 10:11

## 2021-11-17 LAB
ALBUMIN SERPL BCP-MCNC: 3 G/DL (ref 3.5–5.2)
ALP SERPL-CCNC: 110 U/L (ref 55–135)
ALT SERPL W/O P-5'-P-CCNC: 25 U/L (ref 10–44)
ANION GAP SERPL CALC-SCNC: 11 MMOL/L (ref 8–16)
ANION GAP SERPL CALC-SCNC: 6 MMOL/L (ref 8–16)
APTT BLDCRRT: 27.6 SEC (ref 21–32)
AST SERPL-CCNC: 25 U/L (ref 10–40)
BASOPHILS # BLD AUTO: 0.06 K/UL (ref 0–0.2)
BASOPHILS NFR BLD: 0.4 % (ref 0–1.9)
BILIRUB SERPL-MCNC: 1.2 MG/DL (ref 0.1–1)
BUN SERPL-MCNC: 10 MG/DL (ref 6–20)
BUN SERPL-MCNC: 13 MG/DL (ref 6–20)
CALCIUM SERPL-MCNC: 7.9 MG/DL (ref 8.7–10.5)
CALCIUM SERPL-MCNC: 8.2 MG/DL (ref 8.7–10.5)
CHLORIDE SERPL-SCNC: 105 MMOL/L (ref 95–110)
CHLORIDE SERPL-SCNC: 107 MMOL/L (ref 95–110)
CO2 SERPL-SCNC: 21 MMOL/L (ref 23–29)
CO2 SERPL-SCNC: 25 MMOL/L (ref 23–29)
CREAT SERPL-MCNC: 0.5 MG/DL (ref 0.5–1.4)
CREAT SERPL-MCNC: 0.6 MG/DL (ref 0.5–1.4)
DIFFERENTIAL METHOD: ABNORMAL
EOSINOPHIL # BLD AUTO: 0.1 K/UL (ref 0–0.5)
EOSINOPHIL NFR BLD: 0.6 % (ref 0–8)
ERYTHROCYTE [DISTWIDTH] IN BLOOD BY AUTOMATED COUNT: 13.4 % (ref 11.5–14.5)
EST. GFR  (AFRICAN AMERICAN): >60 ML/MIN/1.73 M^2
EST. GFR  (AFRICAN AMERICAN): >60 ML/MIN/1.73 M^2
EST. GFR  (NON AFRICAN AMERICAN): >60 ML/MIN/1.73 M^2
EST. GFR  (NON AFRICAN AMERICAN): >60 ML/MIN/1.73 M^2
FIBRINOGEN PPP-MCNC: 533 MG/DL (ref 182–400)
GLUCOSE SERPL-MCNC: 124 MG/DL (ref 70–110)
GLUCOSE SERPL-MCNC: 142 MG/DL (ref 70–110)
HCT VFR BLD AUTO: 28.3 % (ref 37–48.5)
HGB BLD-MCNC: 9 G/DL (ref 12–16)
IMM GRANULOCYTES # BLD AUTO: 0.07 K/UL (ref 0–0.04)
IMM GRANULOCYTES NFR BLD AUTO: 0.5 % (ref 0–0.5)
INR PPP: 1 (ref 0.8–1.2)
LYMPHOCYTES # BLD AUTO: 1.8 K/UL (ref 1–4.8)
LYMPHOCYTES NFR BLD: 12.9 % (ref 18–48)
MAGNESIUM SERPL-MCNC: 1.9 MG/DL (ref 1.6–2.6)
MCH RBC QN AUTO: 27.6 PG (ref 27–31)
MCHC RBC AUTO-ENTMCNC: 31.8 G/DL (ref 32–36)
MCV RBC AUTO: 87 FL (ref 82–98)
MONOCYTES # BLD AUTO: 0.9 K/UL (ref 0.3–1)
MONOCYTES NFR BLD: 6.9 % (ref 4–15)
NEUTROPHILS # BLD AUTO: 10.7 K/UL (ref 1.8–7.7)
NEUTROPHILS NFR BLD: 78.7 % (ref 38–73)
NRBC BLD-RTO: 0 /100 WBC
PHOSPHATE SERPL-MCNC: 2.5 MG/DL (ref 2.7–4.5)
PLATELET # BLD AUTO: 201 K/UL (ref 150–450)
PMV BLD AUTO: 10.7 FL (ref 9.2–12.9)
POCT GLUCOSE: 132 MG/DL (ref 70–110)
POCT GLUCOSE: 150 MG/DL (ref 70–110)
POCT GLUCOSE: 159 MG/DL (ref 70–110)
POTASSIUM SERPL-SCNC: 4.2 MMOL/L (ref 3.5–5.1)
POTASSIUM SERPL-SCNC: 4.4 MMOL/L (ref 3.5–5.1)
PROT SERPL-MCNC: 4.9 G/DL (ref 6–8.4)
PROTHROMBIN TIME: 11 SEC (ref 9–12.5)
RBC # BLD AUTO: 3.26 M/UL (ref 4–5.4)
SODIUM SERPL-SCNC: 136 MMOL/L (ref 136–145)
SODIUM SERPL-SCNC: 139 MMOL/L (ref 136–145)
WBC # BLD AUTO: 13.62 K/UL (ref 3.9–12.7)

## 2021-11-17 PROCEDURE — 85025 COMPLETE CBC W/AUTO DIFF WBC: CPT | Performed by: STUDENT IN AN ORGANIZED HEALTH CARE EDUCATION/TRAINING PROGRAM

## 2021-11-17 PROCEDURE — 94761 N-INVAS EAR/PLS OXIMETRY MLT: CPT

## 2021-11-17 PROCEDURE — 80053 COMPREHEN METABOLIC PANEL: CPT | Performed by: STUDENT IN AN ORGANIZED HEALTH CARE EDUCATION/TRAINING PROGRAM

## 2021-11-17 PROCEDURE — 25000003 PHARM REV CODE 250: Performed by: THORACIC SURGERY (CARDIOTHORACIC VASCULAR SURGERY)

## 2021-11-17 PROCEDURE — 84100 ASSAY OF PHOSPHORUS: CPT | Performed by: STUDENT IN AN ORGANIZED HEALTH CARE EDUCATION/TRAINING PROGRAM

## 2021-11-17 PROCEDURE — 99232 PR SUBSEQUENT HOSPITAL CARE,LEVL II: ICD-10-PCS | Mod: ,,, | Performed by: ANESTHESIOLOGY

## 2021-11-17 PROCEDURE — 97535 SELF CARE MNGMENT TRAINING: CPT

## 2021-11-17 PROCEDURE — 85610 PROTHROMBIN TIME: CPT | Performed by: THORACIC SURGERY (CARDIOTHORACIC VASCULAR SURGERY)

## 2021-11-17 PROCEDURE — 63600175 PHARM REV CODE 636 W HCPCS: Performed by: STUDENT IN AN ORGANIZED HEALTH CARE EDUCATION/TRAINING PROGRAM

## 2021-11-17 PROCEDURE — 93005 ELECTROCARDIOGRAM TRACING: CPT

## 2021-11-17 PROCEDURE — 99232 SBSQ HOSP IP/OBS MODERATE 35: CPT | Mod: ,,, | Performed by: NURSE PRACTITIONER

## 2021-11-17 PROCEDURE — 25000003 PHARM REV CODE 250

## 2021-11-17 PROCEDURE — 63600175 PHARM REV CODE 636 W HCPCS

## 2021-11-17 PROCEDURE — 97116 GAIT TRAINING THERAPY: CPT

## 2021-11-17 PROCEDURE — 85384 FIBRINOGEN ACTIVITY: CPT | Performed by: THORACIC SURGERY (CARDIOTHORACIC VASCULAR SURGERY)

## 2021-11-17 PROCEDURE — 20000000 HC ICU ROOM

## 2021-11-17 PROCEDURE — 63600175 PHARM REV CODE 636 W HCPCS: Performed by: NURSE PRACTITIONER

## 2021-11-17 PROCEDURE — 99232 SBSQ HOSP IP/OBS MODERATE 35: CPT | Mod: ,,, | Performed by: ANESTHESIOLOGY

## 2021-11-17 PROCEDURE — 99232 PR SUBSEQUENT HOSPITAL CARE,LEVL II: ICD-10-PCS | Mod: ,,, | Performed by: NURSE PRACTITIONER

## 2021-11-17 PROCEDURE — 80048 BASIC METABOLIC PNL TOTAL CA: CPT | Performed by: STUDENT IN AN ORGANIZED HEALTH CARE EDUCATION/TRAINING PROGRAM

## 2021-11-17 PROCEDURE — 25000003 PHARM REV CODE 250: Performed by: STUDENT IN AN ORGANIZED HEALTH CARE EDUCATION/TRAINING PROGRAM

## 2021-11-17 PROCEDURE — 83735 ASSAY OF MAGNESIUM: CPT | Performed by: STUDENT IN AN ORGANIZED HEALTH CARE EDUCATION/TRAINING PROGRAM

## 2021-11-17 PROCEDURE — 93010 EKG 12-LEAD: ICD-10-PCS | Mod: ,,, | Performed by: INTERNAL MEDICINE

## 2021-11-17 PROCEDURE — 99900035 HC TECH TIME PER 15 MIN (STAT)

## 2021-11-17 PROCEDURE — 93010 ELECTROCARDIOGRAM REPORT: CPT | Mod: ,,, | Performed by: INTERNAL MEDICINE

## 2021-11-17 PROCEDURE — 27000221 HC OXYGEN, UP TO 24 HOURS

## 2021-11-17 PROCEDURE — 85730 THROMBOPLASTIN TIME PARTIAL: CPT | Performed by: THORACIC SURGERY (CARDIOTHORACIC VASCULAR SURGERY)

## 2021-11-17 RX ORDER — PANTOPRAZOLE SODIUM 40 MG/1
40 TABLET, DELAYED RELEASE ORAL DAILY
Status: DISCONTINUED | OUTPATIENT
Start: 2021-11-17 | End: 2021-11-18

## 2021-11-17 RX ORDER — HYDROMORPHONE HYDROCHLORIDE 1 MG/ML
1 INJECTION, SOLUTION INTRAMUSCULAR; INTRAVENOUS; SUBCUTANEOUS EVERY 4 HOURS PRN
Status: DISCONTINUED | OUTPATIENT
Start: 2021-11-17 | End: 2021-11-19

## 2021-11-17 RX ORDER — METOPROLOL TARTRATE 1 MG/ML
5 INJECTION, SOLUTION INTRAVENOUS EVERY 5 MIN PRN
Status: DISCONTINUED | OUTPATIENT
Start: 2021-11-17 | End: 2021-11-21 | Stop reason: HOSPADM

## 2021-11-17 RX ORDER — MAGNESIUM SULFATE HEPTAHYDRATE 40 MG/ML
2 INJECTION, SOLUTION INTRAVENOUS ONCE
Status: COMPLETED | OUTPATIENT
Start: 2021-11-17 | End: 2021-11-17

## 2021-11-17 RX ORDER — OXYCODONE HYDROCHLORIDE 10 MG/1
10 TABLET ORAL EVERY 4 HOURS PRN
Status: DISCONTINUED | OUTPATIENT
Start: 2021-11-17 | End: 2021-11-21 | Stop reason: HOSPADM

## 2021-11-17 RX ORDER — ACETAMINOPHEN 500 MG
1000 TABLET ORAL EVERY 8 HOURS
Status: DISCONTINUED | OUTPATIENT
Start: 2021-11-17 | End: 2021-11-21 | Stop reason: HOSPADM

## 2021-11-17 RX ORDER — FAMOTIDINE 20 MG/1
20 TABLET, FILM COATED ORAL 2 TIMES DAILY
Status: DISCONTINUED | OUTPATIENT
Start: 2021-11-17 | End: 2021-11-17

## 2021-11-17 RX ORDER — METOPROLOL TARTRATE 1 MG/ML
INJECTION, SOLUTION INTRAVENOUS
Status: COMPLETED
Start: 2021-11-17 | End: 2021-11-17

## 2021-11-17 RX ORDER — METOPROLOL TARTRATE 25 MG/1
25 TABLET, FILM COATED ORAL 2 TIMES DAILY
Status: DISCONTINUED | OUTPATIENT
Start: 2021-11-17 | End: 2021-11-21 | Stop reason: HOSPADM

## 2021-11-17 RX ORDER — OXYCODONE HYDROCHLORIDE 5 MG/1
5 TABLET ORAL EVERY 4 HOURS PRN
Status: DISCONTINUED | OUTPATIENT
Start: 2021-11-17 | End: 2021-11-21 | Stop reason: HOSPADM

## 2021-11-17 RX ORDER — ONDANSETRON 2 MG/ML
4 INJECTION INTRAMUSCULAR; INTRAVENOUS ONCE
Status: COMPLETED | OUTPATIENT
Start: 2021-11-17 | End: 2021-11-17

## 2021-11-17 RX ADMIN — AMIODARONE HYDROCHLORIDE 1 MG/MIN: 1.8 INJECTION, SOLUTION INTRAVENOUS at 08:11

## 2021-11-17 RX ADMIN — ATORVASTATIN CALCIUM 20 MG: 20 TABLET, FILM COATED ORAL at 10:11

## 2021-11-17 RX ADMIN — MUPIROCIN: 20 OINTMENT TOPICAL at 08:11

## 2021-11-17 RX ADMIN — OXYCODONE 5 MG: 5 TABLET ORAL at 03:11

## 2021-11-17 RX ADMIN — PANTOPRAZOLE SODIUM 40 MG: 40 TABLET, DELAYED RELEASE ORAL at 03:11

## 2021-11-17 RX ADMIN — METOROPROLOL TARTRATE 5 MG: 5 INJECTION, SOLUTION INTRAVENOUS at 08:11

## 2021-11-17 RX ADMIN — POLYETHYLENE GLYCOL 3350 17 G: 17 POWDER, FOR SOLUTION ORAL at 10:11

## 2021-11-17 RX ADMIN — ONDANSETRON 4 MG: 2 INJECTION INTRAMUSCULAR; INTRAVENOUS at 05:11

## 2021-11-17 RX ADMIN — ONDANSETRON 4 MG: 2 INJECTION INTRAMUSCULAR; INTRAVENOUS at 04:11

## 2021-11-17 RX ADMIN — METOROPROLOL TARTRATE 5 MG: 5 INJECTION, SOLUTION INTRAVENOUS at 07:11

## 2021-11-17 RX ADMIN — MUPIROCIN: 20 OINTMENT TOPICAL at 10:11

## 2021-11-17 RX ADMIN — OXYCODONE HYDROCHLORIDE AND ACETAMINOPHEN 500 MG: 500 TABLET ORAL at 10:11

## 2021-11-17 RX ADMIN — INSULIN ASPART 1 UNITS: 100 INJECTION, SOLUTION INTRAVENOUS; SUBCUTANEOUS at 05:11

## 2021-11-17 RX ADMIN — MAGNESIUM SULFATE 2 G: 2 INJECTION INTRAVENOUS at 09:11

## 2021-11-17 RX ADMIN — ONDANSETRON 4 MG: 2 INJECTION INTRAMUSCULAR; INTRAVENOUS at 08:11

## 2021-11-17 RX ADMIN — ONDANSETRON 4 MG: 2 INJECTION INTRAMUSCULAR; INTRAVENOUS at 10:11

## 2021-11-17 RX ADMIN — OXYCODONE 5 MG: 5 TABLET ORAL at 08:11

## 2021-11-17 RX ADMIN — HYDROMORPHONE HYDROCHLORIDE 1 MG: 1 INJECTION, SOLUTION INTRAMUSCULAR; INTRAVENOUS; SUBCUTANEOUS at 07:11

## 2021-11-17 RX ADMIN — AMIODARONE HYDROCHLORIDE 1 MG/MIN: 1.8 INJECTION, SOLUTION INTRAVENOUS at 02:11

## 2021-11-17 RX ADMIN — CALCIUM GLUCONATE 1 G: 98 INJECTION, SOLUTION INTRAVENOUS at 10:11

## 2021-11-17 RX ADMIN — HYDROMORPHONE HYDROCHLORIDE 1 MG: 1 INJECTION, SOLUTION INTRAMUSCULAR; INTRAVENOUS; SUBCUTANEOUS at 03:11

## 2021-11-17 RX ADMIN — METOPROLOL TARTRATE 25 MG: 25 TABLET, FILM COATED ORAL at 08:11

## 2021-11-17 RX ADMIN — ACETAMINOPHEN 1000 MG: 500 TABLET ORAL at 05:11

## 2021-11-17 RX ADMIN — ASPIRIN 325 MG ORAL TABLET 325 MG: 325 PILL ORAL at 10:11

## 2021-11-17 RX ADMIN — METOPROLOL TARTRATE 25 MG: 25 TABLET, FILM COATED ORAL at 10:11

## 2021-11-17 RX ADMIN — ACETAMINOPHEN 1000 MG: 500 TABLET ORAL at 01:11

## 2021-11-17 RX ADMIN — LEVOTHYROXINE SODIUM 112 MCG: 112 TABLET ORAL at 05:11

## 2021-11-18 ENCOUNTER — TELEPHONE (OUTPATIENT)
Dept: CARDIOTHORACIC SURGERY | Facility: CLINIC | Age: 61
End: 2021-11-18
Payer: COMMERCIAL

## 2021-11-18 LAB
ALBUMIN SERPL BCP-MCNC: 2.7 G/DL (ref 3.5–5.2)
ALP SERPL-CCNC: 118 U/L (ref 55–135)
ALT SERPL W/O P-5'-P-CCNC: 20 U/L (ref 10–44)
ANION GAP SERPL CALC-SCNC: 7 MMOL/L (ref 8–16)
AST SERPL-CCNC: 14 U/L (ref 10–40)
BASOPHILS # BLD AUTO: 0.04 K/UL (ref 0–0.2)
BASOPHILS NFR BLD: 0.3 % (ref 0–1.9)
BILIRUB SERPL-MCNC: 0.7 MG/DL (ref 0.1–1)
BUN SERPL-MCNC: 9 MG/DL (ref 6–20)
CALCIUM SERPL-MCNC: 7.9 MG/DL (ref 8.7–10.5)
CHLORIDE SERPL-SCNC: 104 MMOL/L (ref 95–110)
CO2 SERPL-SCNC: 25 MMOL/L (ref 23–29)
CREAT SERPL-MCNC: 0.6 MG/DL (ref 0.5–1.4)
DIFFERENTIAL METHOD: ABNORMAL
EOSINOPHIL # BLD AUTO: 0.2 K/UL (ref 0–0.5)
EOSINOPHIL NFR BLD: 1.1 % (ref 0–8)
ERYTHROCYTE [DISTWIDTH] IN BLOOD BY AUTOMATED COUNT: 13.2 % (ref 11.5–14.5)
EST. GFR  (AFRICAN AMERICAN): >60 ML/MIN/1.73 M^2
EST. GFR  (NON AFRICAN AMERICAN): >60 ML/MIN/1.73 M^2
GLUCOSE SERPL-MCNC: 113 MG/DL (ref 70–110)
HCT VFR BLD AUTO: 26.2 % (ref 37–48.5)
HGB BLD-MCNC: 8.5 G/DL (ref 12–16)
IMM GRANULOCYTES # BLD AUTO: 0.07 K/UL (ref 0–0.04)
IMM GRANULOCYTES NFR BLD AUTO: 0.5 % (ref 0–0.5)
LYMPHOCYTES # BLD AUTO: 1.5 K/UL (ref 1–4.8)
LYMPHOCYTES NFR BLD: 10.5 % (ref 18–48)
MAGNESIUM SERPL-MCNC: 2 MG/DL (ref 1.6–2.6)
MCH RBC QN AUTO: 28.6 PG (ref 27–31)
MCHC RBC AUTO-ENTMCNC: 32.4 G/DL (ref 32–36)
MCV RBC AUTO: 88 FL (ref 82–98)
MONOCYTES # BLD AUTO: 0.9 K/UL (ref 0.3–1)
MONOCYTES NFR BLD: 5.9 % (ref 4–15)
NEUTROPHILS # BLD AUTO: 12 K/UL (ref 1.8–7.7)
NEUTROPHILS NFR BLD: 81.7 % (ref 38–73)
NRBC BLD-RTO: 0 /100 WBC
PHOSPHATE SERPL-MCNC: 2.1 MG/DL (ref 2.7–4.5)
PLATELET # BLD AUTO: 186 K/UL (ref 150–450)
PMV BLD AUTO: 10.6 FL (ref 9.2–12.9)
POCT GLUCOSE: 111 MG/DL (ref 70–110)
POCT GLUCOSE: 124 MG/DL (ref 70–110)
POCT GLUCOSE: 129 MG/DL (ref 70–110)
POTASSIUM SERPL-SCNC: 4.1 MMOL/L (ref 3.5–5.1)
PROT SERPL-MCNC: 4.7 G/DL (ref 6–8.4)
RBC # BLD AUTO: 2.97 M/UL (ref 4–5.4)
SODIUM SERPL-SCNC: 136 MMOL/L (ref 136–145)
WBC # BLD AUTO: 14.64 K/UL (ref 3.9–12.7)

## 2021-11-18 PROCEDURE — 25000003 PHARM REV CODE 250: Performed by: STUDENT IN AN ORGANIZED HEALTH CARE EDUCATION/TRAINING PROGRAM

## 2021-11-18 PROCEDURE — 84100 ASSAY OF PHOSPHORUS: CPT | Performed by: STUDENT IN AN ORGANIZED HEALTH CARE EDUCATION/TRAINING PROGRAM

## 2021-11-18 PROCEDURE — 63600175 PHARM REV CODE 636 W HCPCS: Performed by: STUDENT IN AN ORGANIZED HEALTH CARE EDUCATION/TRAINING PROGRAM

## 2021-11-18 PROCEDURE — 99232 PR SUBSEQUENT HOSPITAL CARE,LEVL II: ICD-10-PCS | Mod: ,,, | Performed by: ANESTHESIOLOGY

## 2021-11-18 PROCEDURE — 94761 N-INVAS EAR/PLS OXIMETRY MLT: CPT

## 2021-11-18 PROCEDURE — 83735 ASSAY OF MAGNESIUM: CPT | Performed by: STUDENT IN AN ORGANIZED HEALTH CARE EDUCATION/TRAINING PROGRAM

## 2021-11-18 PROCEDURE — 99232 SBSQ HOSP IP/OBS MODERATE 35: CPT | Mod: ,,, | Performed by: ANESTHESIOLOGY

## 2021-11-18 PROCEDURE — 20600001 HC STEP DOWN PRIVATE ROOM

## 2021-11-18 PROCEDURE — 27000221 HC OXYGEN, UP TO 24 HOURS

## 2021-11-18 PROCEDURE — 63700000 PHARM REV CODE 250 ALT 637 W/O HCPCS: Performed by: STUDENT IN AN ORGANIZED HEALTH CARE EDUCATION/TRAINING PROGRAM

## 2021-11-18 PROCEDURE — 80053 COMPREHEN METABOLIC PANEL: CPT | Performed by: STUDENT IN AN ORGANIZED HEALTH CARE EDUCATION/TRAINING PROGRAM

## 2021-11-18 PROCEDURE — 94640 AIRWAY INHALATION TREATMENT: CPT

## 2021-11-18 PROCEDURE — 97116 GAIT TRAINING THERAPY: CPT

## 2021-11-18 PROCEDURE — 99900035 HC TECH TIME PER 15 MIN (STAT)

## 2021-11-18 PROCEDURE — 97530 THERAPEUTIC ACTIVITIES: CPT

## 2021-11-18 PROCEDURE — 85025 COMPLETE CBC W/AUTO DIFF WBC: CPT | Performed by: STUDENT IN AN ORGANIZED HEALTH CARE EDUCATION/TRAINING PROGRAM

## 2021-11-18 PROCEDURE — 97535 SELF CARE MNGMENT TRAINING: CPT

## 2021-11-18 PROCEDURE — 94799 UNLISTED PULMONARY SVC/PX: CPT

## 2021-11-18 PROCEDURE — 27100171 HC OXYGEN HIGH FLOW UP TO 24 HOURS

## 2021-11-18 RX ORDER — PANTOPRAZOLE SODIUM 40 MG/1
40 TABLET, DELAYED RELEASE ORAL ONCE
Status: DISCONTINUED | OUTPATIENT
Start: 2021-11-18 | End: 2021-11-18

## 2021-11-18 RX ORDER — PANTOPRAZOLE SODIUM 40 MG/1
80 TABLET, DELAYED RELEASE ORAL DAILY
Status: DISCONTINUED | OUTPATIENT
Start: 2021-11-19 | End: 2021-11-18

## 2021-11-18 RX ORDER — SODIUM,POTASSIUM PHOSPHATES 280-250MG
2 POWDER IN PACKET (EA) ORAL ONCE
Status: DISCONTINUED | OUTPATIENT
Start: 2021-11-18 | End: 2021-11-18

## 2021-11-18 RX ORDER — DESVENLAFAXINE SUCCINATE 50 MG/1
50 TABLET, EXTENDED RELEASE ORAL DAILY
Status: DISCONTINUED | OUTPATIENT
Start: 2021-11-18 | End: 2021-11-21 | Stop reason: HOSPADM

## 2021-11-18 RX ORDER — SODIUM,POTASSIUM PHOSPHATES 280-250MG
2 POWDER IN PACKET (EA) ORAL ONCE
Status: COMPLETED | OUTPATIENT
Start: 2021-11-18 | End: 2021-11-18

## 2021-11-18 RX ORDER — ONDANSETRON 2 MG/ML
4 INJECTION INTRAMUSCULAR; INTRAVENOUS EVERY 6 HOURS PRN
Status: DISCONTINUED | OUTPATIENT
Start: 2021-11-18 | End: 2021-11-21 | Stop reason: HOSPADM

## 2021-11-18 RX ORDER — CALCIUM CARBONATE 200(500)MG
500 TABLET,CHEWABLE ORAL 3 TIMES DAILY PRN
Status: DISCONTINUED | OUTPATIENT
Start: 2021-11-18 | End: 2021-11-21 | Stop reason: HOSPADM

## 2021-11-18 RX ORDER — SUCRALFATE 1 G/1
1 TABLET ORAL
Status: DISCONTINUED | OUTPATIENT
Start: 2021-11-18 | End: 2021-11-18

## 2021-11-18 RX ORDER — BUPROPION HYDROCHLORIDE 150 MG/1
150 TABLET ORAL DAILY
Status: DISCONTINUED | OUTPATIENT
Start: 2021-11-18 | End: 2021-11-21 | Stop reason: HOSPADM

## 2021-11-18 RX ORDER — AMIODARONE HYDROCHLORIDE 200 MG/1
400 TABLET ORAL 2 TIMES DAILY
Status: DISCONTINUED | OUTPATIENT
Start: 2021-11-18 | End: 2021-11-21 | Stop reason: HOSPADM

## 2021-11-18 RX ORDER — DEXLANSOPRAZOLE 60 MG/1
60 CAPSULE, DELAYED RELEASE ORAL DAILY
Status: DISCONTINUED | OUTPATIENT
Start: 2021-11-18 | End: 2021-11-21 | Stop reason: HOSPADM

## 2021-11-18 RX ADMIN — MUPIROCIN: 20 OINTMENT TOPICAL at 09:11

## 2021-11-18 RX ADMIN — HYDROMORPHONE HYDROCHLORIDE 1 MG: 1 INJECTION, SOLUTION INTRAMUSCULAR; INTRAVENOUS; SUBCUTANEOUS at 05:11

## 2021-11-18 RX ADMIN — ASPIRIN 325 MG ORAL TABLET 325 MG: 325 PILL ORAL at 08:11

## 2021-11-18 RX ADMIN — OXYCODONE 5 MG: 5 TABLET ORAL at 03:11

## 2021-11-18 RX ADMIN — METOPROLOL TARTRATE 25 MG: 25 TABLET, FILM COATED ORAL at 08:11

## 2021-11-18 RX ADMIN — DEXLANSOPRAZOLE 60 MG: 60 CAPSULE, DELAYED RELEASE ORAL at 09:11

## 2021-11-18 RX ADMIN — AMIODARONE HYDROCHLORIDE 1 MG/MIN: 1.8 INJECTION, SOLUTION INTRAVENOUS at 01:11

## 2021-11-18 RX ADMIN — ONDANSETRON 4 MG: 2 INJECTION INTRAMUSCULAR; INTRAVENOUS at 01:11

## 2021-11-18 RX ADMIN — AMIODARONE HYDROCHLORIDE 400 MG: 200 TABLET ORAL at 09:11

## 2021-11-18 RX ADMIN — ATORVASTATIN CALCIUM 20 MG: 20 TABLET, FILM COATED ORAL at 08:11

## 2021-11-18 RX ADMIN — DESVENLAFAXINE 50 MG: 50 TABLET, EXTENDED RELEASE ORAL at 12:11

## 2021-11-18 RX ADMIN — ACETAMINOPHEN 1000 MG: 500 TABLET ORAL at 02:11

## 2021-11-18 RX ADMIN — HYDROMORPHONE HYDROCHLORIDE 1 MG: 1 INJECTION, SOLUTION INTRAMUSCULAR; INTRAVENOUS; SUBCUTANEOUS at 11:11

## 2021-11-18 RX ADMIN — LEVOTHYROXINE SODIUM 112 MCG: 112 TABLET ORAL at 05:11

## 2021-11-18 RX ADMIN — POTASSIUM & SODIUM PHOSPHATES POWDER PACK 280-160-250 MG 2 PACKET: 280-160-250 PACK at 05:11

## 2021-11-18 RX ADMIN — OXYCODONE HYDROCHLORIDE 10 MG: 10 TABLET ORAL at 07:11

## 2021-11-18 RX ADMIN — ACETAMINOPHEN 1000 MG: 500 TABLET ORAL at 05:11

## 2021-11-18 RX ADMIN — AMIODARONE HYDROCHLORIDE 400 MG: 200 TABLET ORAL at 11:11

## 2021-11-18 RX ADMIN — POLYETHYLENE GLYCOL 3350 17 G: 17 POWDER, FOR SOLUTION ORAL at 08:11

## 2021-11-18 RX ADMIN — ACETAMINOPHEN 1000 MG: 500 TABLET ORAL at 09:11

## 2021-11-18 RX ADMIN — BUPROPION HYDROCHLORIDE 150 MG: 150 TABLET, FILM COATED, EXTENDED RELEASE ORAL at 12:11

## 2021-11-18 RX ADMIN — OXYCODONE 5 MG: 5 TABLET ORAL at 10:11

## 2021-11-18 RX ADMIN — MUPIROCIN: 20 OINTMENT TOPICAL at 08:11

## 2021-11-18 RX ADMIN — PANTOPRAZOLE SODIUM 40 MG: 40 TABLET, DELAYED RELEASE ORAL at 08:11

## 2021-11-19 ENCOUNTER — RESEARCH ENCOUNTER (OUTPATIENT)
Dept: RESEARCH | Facility: HOSPITAL | Age: 61
End: 2021-11-19
Payer: COMMERCIAL

## 2021-11-19 PROBLEM — I48.91 A-FIB: Status: ACTIVE | Noted: 2021-11-19

## 2021-11-19 PROBLEM — D62 ACUTE BLOOD LOSS ANEMIA: Status: ACTIVE | Noted: 2021-11-19

## 2021-11-19 LAB
ALBUMIN SERPL BCP-MCNC: 2.5 G/DL (ref 3.5–5.2)
ALP SERPL-CCNC: 121 U/L (ref 55–135)
ALT SERPL W/O P-5'-P-CCNC: 17 U/L (ref 10–44)
ANION GAP SERPL CALC-SCNC: 10 MMOL/L (ref 8–16)
AST SERPL-CCNC: 13 U/L (ref 10–40)
BASOPHILS # BLD AUTO: 0.04 K/UL (ref 0–0.2)
BASOPHILS NFR BLD: 0.4 % (ref 0–1.9)
BILIRUB SERPL-MCNC: 0.8 MG/DL (ref 0.1–1)
BUN SERPL-MCNC: 12 MG/DL (ref 6–20)
CALCIUM SERPL-MCNC: 7.9 MG/DL (ref 8.7–10.5)
CHLORIDE SERPL-SCNC: 102 MMOL/L (ref 95–110)
CO2 SERPL-SCNC: 25 MMOL/L (ref 23–29)
CREAT SERPL-MCNC: 0.5 MG/DL (ref 0.5–1.4)
DIFFERENTIAL METHOD: ABNORMAL
EOSINOPHIL # BLD AUTO: 0.3 K/UL (ref 0–0.5)
EOSINOPHIL NFR BLD: 2.3 % (ref 0–8)
ERYTHROCYTE [DISTWIDTH] IN BLOOD BY AUTOMATED COUNT: 13.2 % (ref 11.5–14.5)
EST. GFR  (AFRICAN AMERICAN): >60 ML/MIN/1.73 M^2
EST. GFR  (NON AFRICAN AMERICAN): >60 ML/MIN/1.73 M^2
GLUCOSE SERPL-MCNC: 91 MG/DL (ref 70–110)
HCT VFR BLD AUTO: 28.1 % (ref 37–48.5)
HGB BLD-MCNC: 8.7 G/DL (ref 12–16)
IMM GRANULOCYTES # BLD AUTO: 0.07 K/UL (ref 0–0.04)
IMM GRANULOCYTES NFR BLD AUTO: 0.6 % (ref 0–0.5)
LYMPHOCYTES # BLD AUTO: 1.3 K/UL (ref 1–4.8)
LYMPHOCYTES NFR BLD: 12 % (ref 18–48)
MCH RBC QN AUTO: 28 PG (ref 27–31)
MCHC RBC AUTO-ENTMCNC: 31 G/DL (ref 32–36)
MCV RBC AUTO: 90 FL (ref 82–98)
MONOCYTES # BLD AUTO: 0.6 K/UL (ref 0.3–1)
MONOCYTES NFR BLD: 5.7 % (ref 4–15)
NEUTROPHILS # BLD AUTO: 8.8 K/UL (ref 1.8–7.7)
NEUTROPHILS NFR BLD: 79 % (ref 38–73)
NRBC BLD-RTO: 0 /100 WBC
PLATELET # BLD AUTO: 211 K/UL (ref 150–450)
PMV BLD AUTO: 11.6 FL (ref 9.2–12.9)
POCT GLUCOSE: 108 MG/DL (ref 70–110)
POTASSIUM SERPL-SCNC: 3.9 MMOL/L (ref 3.5–5.1)
PROT SERPL-MCNC: 4.8 G/DL (ref 6–8.4)
RBC # BLD AUTO: 3.11 M/UL (ref 4–5.4)
SODIUM SERPL-SCNC: 137 MMOL/L (ref 136–145)
WBC # BLD AUTO: 11.19 K/UL (ref 3.9–12.7)

## 2021-11-19 PROCEDURE — 63600175 PHARM REV CODE 636 W HCPCS: Performed by: PHYSICIAN ASSISTANT

## 2021-11-19 PROCEDURE — 25000003 PHARM REV CODE 250: Performed by: STUDENT IN AN ORGANIZED HEALTH CARE EDUCATION/TRAINING PROGRAM

## 2021-11-19 PROCEDURE — 80053 COMPREHEN METABOLIC PANEL: CPT | Performed by: STUDENT IN AN ORGANIZED HEALTH CARE EDUCATION/TRAINING PROGRAM

## 2021-11-19 PROCEDURE — 36415 COLL VENOUS BLD VENIPUNCTURE: CPT | Performed by: STUDENT IN AN ORGANIZED HEALTH CARE EDUCATION/TRAINING PROGRAM

## 2021-11-19 PROCEDURE — 25000003 PHARM REV CODE 250: Performed by: PHYSICIAN ASSISTANT

## 2021-11-19 PROCEDURE — 20600001 HC STEP DOWN PRIVATE ROOM

## 2021-11-19 PROCEDURE — 63600175 PHARM REV CODE 636 W HCPCS: Performed by: STUDENT IN AN ORGANIZED HEALTH CARE EDUCATION/TRAINING PROGRAM

## 2021-11-19 PROCEDURE — 85025 COMPLETE CBC W/AUTO DIFF WBC: CPT | Performed by: STUDENT IN AN ORGANIZED HEALTH CARE EDUCATION/TRAINING PROGRAM

## 2021-11-19 PROCEDURE — 63700000 PHARM REV CODE 250 ALT 637 W/O HCPCS: Performed by: STUDENT IN AN ORGANIZED HEALTH CARE EDUCATION/TRAINING PROGRAM

## 2021-11-19 RX ORDER — TALC
12 POWDER (GRAM) TOPICAL NIGHTLY PRN
Status: DISCONTINUED | OUTPATIENT
Start: 2021-11-19 | End: 2021-11-21 | Stop reason: HOSPADM

## 2021-11-19 RX ORDER — POTASSIUM CHLORIDE 20 MEQ/1
20 TABLET, EXTENDED RELEASE ORAL 2 TIMES DAILY
Status: DISCONTINUED | OUTPATIENT
Start: 2021-11-19 | End: 2021-11-21 | Stop reason: HOSPADM

## 2021-11-19 RX ORDER — FUROSEMIDE 10 MG/ML
20 INJECTION INTRAMUSCULAR; INTRAVENOUS
Status: DISCONTINUED | OUTPATIENT
Start: 2021-11-19 | End: 2021-11-20

## 2021-11-19 RX ADMIN — METOPROLOL TARTRATE 25 MG: 25 TABLET, FILM COATED ORAL at 08:11

## 2021-11-19 RX ADMIN — HYDROMORPHONE HYDROCHLORIDE 1 MG: 1 INJECTION, SOLUTION INTRAMUSCULAR; INTRAVENOUS; SUBCUTANEOUS at 07:11

## 2021-11-19 RX ADMIN — ONDANSETRON 4 MG: 2 INJECTION INTRAMUSCULAR; INTRAVENOUS at 02:11

## 2021-11-19 RX ADMIN — ATORVASTATIN CALCIUM 20 MG: 20 TABLET, FILM COATED ORAL at 08:11

## 2021-11-19 RX ADMIN — FUROSEMIDE 20 MG: 10 INJECTION, SOLUTION INTRAVENOUS at 09:11

## 2021-11-19 RX ADMIN — OXYCODONE 5 MG: 5 TABLET ORAL at 04:11

## 2021-11-19 RX ADMIN — LEVOTHYROXINE SODIUM 112 MCG: 112 TABLET ORAL at 06:11

## 2021-11-19 RX ADMIN — OXYCODONE HYDROCHLORIDE 10 MG: 10 TABLET ORAL at 11:11

## 2021-11-19 RX ADMIN — FUROSEMIDE 20 MG: 10 INJECTION, SOLUTION INTRAVENOUS at 10:11

## 2021-11-19 RX ADMIN — AMIODARONE HYDROCHLORIDE 400 MG: 200 TABLET ORAL at 08:11

## 2021-11-19 RX ADMIN — DESVENLAFAXINE 50 MG: 50 TABLET, EXTENDED RELEASE ORAL at 08:11

## 2021-11-19 RX ADMIN — ONDANSETRON 4 MG: 2 INJECTION INTRAMUSCULAR; INTRAVENOUS at 07:11

## 2021-11-19 RX ADMIN — OXYCODONE HYDROCHLORIDE 10 MG: 10 TABLET ORAL at 04:11

## 2021-11-19 RX ADMIN — POTASSIUM CHLORIDE 20 MEQ: 1500 TABLET, EXTENDED RELEASE ORAL at 09:11

## 2021-11-19 RX ADMIN — ASPIRIN 325 MG ORAL TABLET 325 MG: 325 PILL ORAL at 08:11

## 2021-11-19 RX ADMIN — Medication 12 MG: at 10:11

## 2021-11-19 RX ADMIN — POLYETHYLENE GLYCOL 3350 17 G: 17 POWDER, FOR SOLUTION ORAL at 08:11

## 2021-11-19 RX ADMIN — POTASSIUM CHLORIDE 20 MEQ: 1500 TABLET, EXTENDED RELEASE ORAL at 08:11

## 2021-11-19 RX ADMIN — ACETAMINOPHEN 1000 MG: 500 TABLET ORAL at 06:11

## 2021-11-19 RX ADMIN — BUPROPION HYDROCHLORIDE 150 MG: 150 TABLET, FILM COATED, EXTENDED RELEASE ORAL at 08:11

## 2021-11-19 RX ADMIN — ACETAMINOPHEN 1000 MG: 500 TABLET ORAL at 10:11

## 2021-11-19 RX ADMIN — PROMETHAZINE HYDROCHLORIDE 6.25 MG: 25 INJECTION INTRAMUSCULAR; INTRAVENOUS at 05:11

## 2021-11-19 RX ADMIN — MUPIROCIN: 20 OINTMENT TOPICAL at 08:11

## 2021-11-19 RX ADMIN — DEXLANSOPRAZOLE 60 MG: 60 CAPSULE, DELAYED RELEASE ORAL at 09:11

## 2021-11-20 LAB
ALBUMIN SERPL BCP-MCNC: 2.7 G/DL (ref 3.5–5.2)
ALP SERPL-CCNC: 128 U/L (ref 55–135)
ALT SERPL W/O P-5'-P-CCNC: 15 U/L (ref 10–44)
ANION GAP SERPL CALC-SCNC: 12 MMOL/L (ref 8–16)
AST SERPL-CCNC: 12 U/L (ref 10–40)
BASOPHILS # BLD AUTO: 0.05 K/UL (ref 0–0.2)
BASOPHILS NFR BLD: 0.5 % (ref 0–1.9)
BILIRUB SERPL-MCNC: 0.7 MG/DL (ref 0.1–1)
BUN SERPL-MCNC: 9 MG/DL (ref 6–20)
CALCIUM SERPL-MCNC: 8 MG/DL (ref 8.7–10.5)
CHLORIDE SERPL-SCNC: 100 MMOL/L (ref 95–110)
CO2 SERPL-SCNC: 26 MMOL/L (ref 23–29)
CREAT SERPL-MCNC: 0.5 MG/DL (ref 0.5–1.4)
DIFFERENTIAL METHOD: ABNORMAL
EOSINOPHIL # BLD AUTO: 0.3 K/UL (ref 0–0.5)
EOSINOPHIL NFR BLD: 2.9 % (ref 0–8)
ERYTHROCYTE [DISTWIDTH] IN BLOOD BY AUTOMATED COUNT: 13.1 % (ref 11.5–14.5)
EST. GFR  (AFRICAN AMERICAN): >60 ML/MIN/1.73 M^2
EST. GFR  (NON AFRICAN AMERICAN): >60 ML/MIN/1.73 M^2
GLUCOSE SERPL-MCNC: 100 MG/DL (ref 70–110)
HCT VFR BLD AUTO: 29.9 % (ref 37–48.5)
HGB BLD-MCNC: 9.3 G/DL (ref 12–16)
IMM GRANULOCYTES # BLD AUTO: 0.06 K/UL (ref 0–0.04)
IMM GRANULOCYTES NFR BLD AUTO: 0.5 % (ref 0–0.5)
LYMPHOCYTES # BLD AUTO: 1.6 K/UL (ref 1–4.8)
LYMPHOCYTES NFR BLD: 14.8 % (ref 18–48)
MCH RBC QN AUTO: 27.9 PG (ref 27–31)
MCHC RBC AUTO-ENTMCNC: 31.1 G/DL (ref 32–36)
MCV RBC AUTO: 90 FL (ref 82–98)
MONOCYTES # BLD AUTO: 0.6 K/UL (ref 0.3–1)
MONOCYTES NFR BLD: 5.5 % (ref 4–15)
NEUTROPHILS # BLD AUTO: 8.3 K/UL (ref 1.8–7.7)
NEUTROPHILS NFR BLD: 75.8 % (ref 38–73)
NRBC BLD-RTO: 0 /100 WBC
PLATELET # BLD AUTO: 293 K/UL (ref 150–450)
PMV BLD AUTO: 10.6 FL (ref 9.2–12.9)
POTASSIUM SERPL-SCNC: 4 MMOL/L (ref 3.5–5.1)
PROT SERPL-MCNC: 5.2 G/DL (ref 6–8.4)
RBC # BLD AUTO: 3.33 M/UL (ref 4–5.4)
SODIUM SERPL-SCNC: 138 MMOL/L (ref 136–145)
WBC # BLD AUTO: 11.01 K/UL (ref 3.9–12.7)

## 2021-11-20 PROCEDURE — 25000003 PHARM REV CODE 250: Performed by: STUDENT IN AN ORGANIZED HEALTH CARE EDUCATION/TRAINING PROGRAM

## 2021-11-20 PROCEDURE — 36415 COLL VENOUS BLD VENIPUNCTURE: CPT | Performed by: STUDENT IN AN ORGANIZED HEALTH CARE EDUCATION/TRAINING PROGRAM

## 2021-11-20 PROCEDURE — 20600001 HC STEP DOWN PRIVATE ROOM

## 2021-11-20 PROCEDURE — 85025 COMPLETE CBC W/AUTO DIFF WBC: CPT | Performed by: STUDENT IN AN ORGANIZED HEALTH CARE EDUCATION/TRAINING PROGRAM

## 2021-11-20 PROCEDURE — 94799 UNLISTED PULMONARY SVC/PX: CPT

## 2021-11-20 PROCEDURE — 99900035 HC TECH TIME PER 15 MIN (STAT)

## 2021-11-20 PROCEDURE — 63600175 PHARM REV CODE 636 W HCPCS: Performed by: THORACIC SURGERY (CARDIOTHORACIC VASCULAR SURGERY)

## 2021-11-20 PROCEDURE — 80053 COMPREHEN METABOLIC PANEL: CPT | Performed by: STUDENT IN AN ORGANIZED HEALTH CARE EDUCATION/TRAINING PROGRAM

## 2021-11-20 PROCEDURE — 25000003 PHARM REV CODE 250: Performed by: PHYSICIAN ASSISTANT

## 2021-11-20 PROCEDURE — 63600175 PHARM REV CODE 636 W HCPCS: Performed by: PHYSICIAN ASSISTANT

## 2021-11-20 PROCEDURE — 63600175 PHARM REV CODE 636 W HCPCS: Performed by: STUDENT IN AN ORGANIZED HEALTH CARE EDUCATION/TRAINING PROGRAM

## 2021-11-20 PROCEDURE — 97530 THERAPEUTIC ACTIVITIES: CPT

## 2021-11-20 PROCEDURE — 63700000 PHARM REV CODE 250 ALT 637 W/O HCPCS: Performed by: STUDENT IN AN ORGANIZED HEALTH CARE EDUCATION/TRAINING PROGRAM

## 2021-11-20 PROCEDURE — 94761 N-INVAS EAR/PLS OXIMETRY MLT: CPT

## 2021-11-20 RX ORDER — ASPIRIN 325 MG
325 TABLET ORAL DAILY
Qty: 30 TABLET | Refills: 11 | Status: SHIPPED | OUTPATIENT
Start: 2021-11-21 | End: 2022-11-14 | Stop reason: DRUGHIGH

## 2021-11-20 RX ORDER — CHLORHEXIDINE GLUCONATE 4 %
12 LIQUID (ML) TOPICAL NIGHTLY PRN
Qty: 30 TABLET | Refills: 0 | Status: SHIPPED | OUTPATIENT
Start: 2021-11-20 | End: 2022-09-23

## 2021-11-20 RX ORDER — POTASSIUM CHLORIDE 20 MEQ/1
TABLET, EXTENDED RELEASE ORAL
Qty: 30 TABLET | Refills: 3 | Status: SHIPPED | OUTPATIENT
Start: 2021-11-20 | End: 2021-12-14 | Stop reason: ALTCHOICE

## 2021-11-20 RX ORDER — ACETAMINOPHEN 500 MG
1000 TABLET ORAL EVERY 6 HOURS PRN
Qty: 30 TABLET | Refills: 0 | Status: SHIPPED | OUTPATIENT
Start: 2021-11-20

## 2021-11-20 RX ORDER — METOPROLOL TARTRATE 25 MG/1
25 TABLET, FILM COATED ORAL 2 TIMES DAILY
Qty: 60 TABLET | Refills: 11 | Status: SHIPPED | OUTPATIENT
Start: 2021-11-20 | End: 2022-01-13 | Stop reason: ALTCHOICE

## 2021-11-20 RX ORDER — FUROSEMIDE 20 MG/1
TABLET ORAL
Qty: 30 TABLET | Refills: 11 | Status: SHIPPED | OUTPATIENT
Start: 2021-11-20 | End: 2021-12-14 | Stop reason: ALTCHOICE

## 2021-11-20 RX ORDER — ONDANSETRON 4 MG/1
8 TABLET, ORALLY DISINTEGRATING ORAL EVERY 6 HOURS PRN
Qty: 30 TABLET | Refills: 1 | Status: SHIPPED | OUTPATIENT
Start: 2021-11-20 | End: 2021-12-14 | Stop reason: SDUPTHER

## 2021-11-20 RX ORDER — AMIODARONE HYDROCHLORIDE 400 MG/1
400 TABLET ORAL 2 TIMES DAILY
Qty: 60 TABLET | Refills: 11 | Status: SHIPPED | OUTPATIENT
Start: 2021-11-20 | End: 2021-12-14 | Stop reason: ALTCHOICE

## 2021-11-20 RX ORDER — POLYETHYLENE GLYCOL 3350 17 G/17G
17 POWDER, FOR SOLUTION ORAL DAILY
Qty: 10 EACH | Refills: 0 | Status: SHIPPED | OUTPATIENT
Start: 2021-11-21 | End: 2021-12-14 | Stop reason: ALTCHOICE

## 2021-11-20 RX ORDER — OXYCODONE HYDROCHLORIDE 5 MG/1
5 TABLET ORAL EVERY 4 HOURS PRN
Qty: 42 TABLET | Refills: 0 | Status: SHIPPED | OUTPATIENT
Start: 2021-11-20 | End: 2021-11-27

## 2021-11-20 RX ORDER — FUROSEMIDE 10 MG/ML
20 INJECTION INTRAMUSCULAR; INTRAVENOUS
Status: DISCONTINUED | OUTPATIENT
Start: 2021-11-20 | End: 2021-11-21 | Stop reason: HOSPADM

## 2021-11-20 RX ADMIN — OXYCODONE 5 MG: 5 TABLET ORAL at 03:11

## 2021-11-20 RX ADMIN — DESVENLAFAXINE 50 MG: 50 TABLET, EXTENDED RELEASE ORAL at 11:11

## 2021-11-20 RX ADMIN — MUPIROCIN: 20 OINTMENT TOPICAL at 08:11

## 2021-11-20 RX ADMIN — ACETAMINOPHEN 1000 MG: 500 TABLET ORAL at 10:11

## 2021-11-20 RX ADMIN — METOPROLOL TARTRATE 25 MG: 25 TABLET, FILM COATED ORAL at 09:11

## 2021-11-20 RX ADMIN — ONDANSETRON 4 MG: 2 INJECTION INTRAMUSCULAR; INTRAVENOUS at 09:11

## 2021-11-20 RX ADMIN — ACETAMINOPHEN 1000 MG: 500 TABLET ORAL at 02:11

## 2021-11-20 RX ADMIN — AMIODARONE HYDROCHLORIDE 400 MG: 200 TABLET ORAL at 08:11

## 2021-11-20 RX ADMIN — Medication 12 MG: at 06:11

## 2021-11-20 RX ADMIN — AMIODARONE HYDROCHLORIDE 400 MG: 200 TABLET ORAL at 09:11

## 2021-11-20 RX ADMIN — ASPIRIN 325 MG ORAL TABLET 325 MG: 325 PILL ORAL at 09:11

## 2021-11-20 RX ADMIN — ONDANSETRON 4 MG: 2 INJECTION INTRAMUSCULAR; INTRAVENOUS at 03:11

## 2021-11-20 RX ADMIN — FUROSEMIDE 20 MG: 10 INJECTION, SOLUTION INTRAVENOUS at 09:11

## 2021-11-20 RX ADMIN — OXYCODONE HYDROCHLORIDE 10 MG: 10 TABLET ORAL at 08:11

## 2021-11-20 RX ADMIN — MUPIROCIN: 20 OINTMENT TOPICAL at 09:11

## 2021-11-20 RX ADMIN — METOPROLOL TARTRATE 25 MG: 25 TABLET, FILM COATED ORAL at 08:11

## 2021-11-20 RX ADMIN — POLYETHYLENE GLYCOL 3350 17 G: 17 POWDER, FOR SOLUTION ORAL at 09:11

## 2021-11-20 RX ADMIN — LEVOTHYROXINE SODIUM 112 MCG: 112 TABLET ORAL at 05:11

## 2021-11-20 RX ADMIN — POTASSIUM CHLORIDE 20 MEQ: 1500 TABLET, EXTENDED RELEASE ORAL at 08:11

## 2021-11-20 RX ADMIN — ACETAMINOPHEN 1000 MG: 500 TABLET ORAL at 05:11

## 2021-11-20 RX ADMIN — DEXLANSOPRAZOLE 60 MG: 60 CAPSULE, DELAYED RELEASE ORAL at 09:11

## 2021-11-20 RX ADMIN — BUPROPION HYDROCHLORIDE 150 MG: 150 TABLET, FILM COATED, EXTENDED RELEASE ORAL at 09:11

## 2021-11-20 RX ADMIN — ATORVASTATIN CALCIUM 20 MG: 20 TABLET, FILM COATED ORAL at 09:11

## 2021-11-20 RX ADMIN — POTASSIUM CHLORIDE 20 MEQ: 1500 TABLET, EXTENDED RELEASE ORAL at 09:11

## 2021-11-20 RX ADMIN — FUROSEMIDE 20 MG: 10 INJECTION, SOLUTION INTRAVENOUS at 08:11

## 2021-11-21 VITALS
WEIGHT: 181.69 LBS | RESPIRATION RATE: 18 BRPM | HEART RATE: 78 BPM | HEIGHT: 63 IN | DIASTOLIC BLOOD PRESSURE: 62 MMHG | BODY MASS INDEX: 32.19 KG/M2 | SYSTOLIC BLOOD PRESSURE: 118 MMHG | TEMPERATURE: 99 F | OXYGEN SATURATION: 97 %

## 2021-11-21 PROBLEM — R73.9 STRESS HYPERGLYCEMIA: Status: RESOLVED | Noted: 2021-11-16 | Resolved: 2021-11-21

## 2021-11-21 LAB
ANION GAP SERPL CALC-SCNC: 10 MMOL/L (ref 8–16)
BASOPHILS # BLD AUTO: 0.05 K/UL (ref 0–0.2)
BASOPHILS NFR BLD: 0.5 % (ref 0–1.9)
BUN SERPL-MCNC: 10 MG/DL (ref 6–20)
CALCIUM SERPL-MCNC: 8.8 MG/DL (ref 8.7–10.5)
CHLORIDE SERPL-SCNC: 103 MMOL/L (ref 95–110)
CO2 SERPL-SCNC: 27 MMOL/L (ref 23–29)
CREAT SERPL-MCNC: 0.6 MG/DL (ref 0.5–1.4)
DIFFERENTIAL METHOD: ABNORMAL
EOSINOPHIL # BLD AUTO: 0.3 K/UL (ref 0–0.5)
EOSINOPHIL NFR BLD: 3.3 % (ref 0–8)
ERYTHROCYTE [DISTWIDTH] IN BLOOD BY AUTOMATED COUNT: 13 % (ref 11.5–14.5)
EST. GFR  (AFRICAN AMERICAN): >60 ML/MIN/1.73 M^2
EST. GFR  (NON AFRICAN AMERICAN): >60 ML/MIN/1.73 M^2
GLUCOSE SERPL-MCNC: 88 MG/DL (ref 70–110)
HCT VFR BLD AUTO: 30.6 % (ref 37–48.5)
HGB BLD-MCNC: 9.6 G/DL (ref 12–16)
IMM GRANULOCYTES # BLD AUTO: 0.1 K/UL (ref 0–0.04)
IMM GRANULOCYTES NFR BLD AUTO: 1.1 % (ref 0–0.5)
LYMPHOCYTES # BLD AUTO: 2.3 K/UL (ref 1–4.8)
LYMPHOCYTES NFR BLD: 24.2 % (ref 18–48)
MAGNESIUM SERPL-MCNC: 1.8 MG/DL (ref 1.6–2.6)
MCH RBC QN AUTO: 27.8 PG (ref 27–31)
MCHC RBC AUTO-ENTMCNC: 31.4 G/DL (ref 32–36)
MCV RBC AUTO: 89 FL (ref 82–98)
MONOCYTES # BLD AUTO: 0.6 K/UL (ref 0.3–1)
MONOCYTES NFR BLD: 6.5 % (ref 4–15)
NEUTROPHILS # BLD AUTO: 6 K/UL (ref 1.8–7.7)
NEUTROPHILS NFR BLD: 64.4 % (ref 38–73)
NRBC BLD-RTO: 0 /100 WBC
PHOSPHATE SERPL-MCNC: 3.4 MG/DL (ref 2.7–4.5)
PLATELET # BLD AUTO: 334 K/UL (ref 150–450)
PMV BLD AUTO: 10.5 FL (ref 9.2–12.9)
POTASSIUM SERPL-SCNC: 4.4 MMOL/L (ref 3.5–5.1)
RBC # BLD AUTO: 3.45 M/UL (ref 4–5.4)
SODIUM SERPL-SCNC: 140 MMOL/L (ref 136–145)
WBC # BLD AUTO: 9.32 K/UL (ref 3.9–12.7)

## 2021-11-21 PROCEDURE — 63700000 PHARM REV CODE 250 ALT 637 W/O HCPCS: Performed by: STUDENT IN AN ORGANIZED HEALTH CARE EDUCATION/TRAINING PROGRAM

## 2021-11-21 PROCEDURE — 25000003 PHARM REV CODE 250: Performed by: STUDENT IN AN ORGANIZED HEALTH CARE EDUCATION/TRAINING PROGRAM

## 2021-11-21 PROCEDURE — 94761 N-INVAS EAR/PLS OXIMETRY MLT: CPT

## 2021-11-21 PROCEDURE — 80048 BASIC METABOLIC PNL TOTAL CA: CPT | Performed by: PHYSICIAN ASSISTANT

## 2021-11-21 PROCEDURE — 83735 ASSAY OF MAGNESIUM: CPT | Performed by: PHYSICIAN ASSISTANT

## 2021-11-21 PROCEDURE — 99900035 HC TECH TIME PER 15 MIN (STAT)

## 2021-11-21 PROCEDURE — 85025 COMPLETE CBC W/AUTO DIFF WBC: CPT | Performed by: STUDENT IN AN ORGANIZED HEALTH CARE EDUCATION/TRAINING PROGRAM

## 2021-11-21 PROCEDURE — 63600175 PHARM REV CODE 636 W HCPCS: Performed by: THORACIC SURGERY (CARDIOTHORACIC VASCULAR SURGERY)

## 2021-11-21 PROCEDURE — 25000003 PHARM REV CODE 250: Performed by: PHYSICIAN ASSISTANT

## 2021-11-21 PROCEDURE — 84100 ASSAY OF PHOSPHORUS: CPT | Performed by: PHYSICIAN ASSISTANT

## 2021-11-21 RX ADMIN — AMIODARONE HYDROCHLORIDE 400 MG: 200 TABLET ORAL at 08:11

## 2021-11-21 RX ADMIN — FUROSEMIDE 20 MG: 10 INJECTION, SOLUTION INTRAVENOUS at 08:11

## 2021-11-21 RX ADMIN — ATORVASTATIN CALCIUM 20 MG: 20 TABLET, FILM COATED ORAL at 08:11

## 2021-11-21 RX ADMIN — POTASSIUM CHLORIDE 20 MEQ: 1500 TABLET, EXTENDED RELEASE ORAL at 08:11

## 2021-11-21 RX ADMIN — ACETAMINOPHEN 1000 MG: 500 TABLET ORAL at 05:11

## 2021-11-21 RX ADMIN — ASPIRIN 325 MG ORAL TABLET 325 MG: 325 PILL ORAL at 08:11

## 2021-11-21 RX ADMIN — BUPROPION HYDROCHLORIDE 150 MG: 150 TABLET, FILM COATED, EXTENDED RELEASE ORAL at 08:11

## 2021-11-21 RX ADMIN — METOPROLOL TARTRATE 25 MG: 25 TABLET, FILM COATED ORAL at 08:11

## 2021-11-21 RX ADMIN — LEVOTHYROXINE SODIUM 112 MCG: 112 TABLET ORAL at 05:11

## 2021-11-21 RX ADMIN — DEXLANSOPRAZOLE 60 MG: 60 CAPSULE, DELAYED RELEASE ORAL at 09:11

## 2021-11-22 ENCOUNTER — PATIENT MESSAGE (OUTPATIENT)
Dept: CARDIOTHORACIC SURGERY | Facility: CLINIC | Age: 61
End: 2021-11-22
Payer: COMMERCIAL

## 2021-11-23 ENCOUNTER — PATIENT OUTREACH (OUTPATIENT)
Dept: ADMINISTRATIVE | Facility: CLINIC | Age: 61
End: 2021-11-23
Payer: COMMERCIAL

## 2021-11-23 ENCOUNTER — TELEPHONE (OUTPATIENT)
Dept: CARDIOTHORACIC SURGERY | Facility: CLINIC | Age: 61
End: 2021-11-23
Payer: COMMERCIAL

## 2021-11-26 ENCOUNTER — TELEPHONE (OUTPATIENT)
Dept: CARDIOTHORACIC SURGERY | Facility: CLINIC | Age: 61
End: 2021-11-26
Payer: COMMERCIAL

## 2021-12-01 ENCOUNTER — PATIENT MESSAGE (OUTPATIENT)
Dept: CARDIOTHORACIC SURGERY | Facility: CLINIC | Age: 61
End: 2021-12-01
Payer: COMMERCIAL

## 2021-12-14 ENCOUNTER — DOCUMENTATION ONLY (OUTPATIENT)
Dept: CARDIOTHORACIC SURGERY | Facility: CLINIC | Age: 61
End: 2021-12-14
Payer: COMMERCIAL

## 2021-12-14 ENCOUNTER — OFFICE VISIT (OUTPATIENT)
Dept: CARDIOTHORACIC SURGERY | Facility: CLINIC | Age: 61
End: 2021-12-14
Payer: COMMERCIAL

## 2021-12-14 VITALS
BODY MASS INDEX: 32.66 KG/M2 | SYSTOLIC BLOOD PRESSURE: 122 MMHG | HEIGHT: 63 IN | RESPIRATION RATE: 18 BRPM | OXYGEN SATURATION: 99 % | DIASTOLIC BLOOD PRESSURE: 64 MMHG | WEIGHT: 184.31 LBS | HEART RATE: 52 BPM

## 2021-12-14 DIAGNOSIS — Z95.1 S/P CABG (CORONARY ARTERY BYPASS GRAFT): Primary | ICD-10-CM

## 2021-12-14 DIAGNOSIS — Z95.1 S/P CABG X 3: Primary | ICD-10-CM

## 2021-12-14 PROCEDURE — 99999 PR PBB SHADOW E&M-EST. PATIENT-LVL V: ICD-10-PCS | Mod: PBBFAC,,, | Performed by: THORACIC SURGERY (CARDIOTHORACIC VASCULAR SURGERY)

## 2021-12-14 PROCEDURE — 99999 PR PBB SHADOW E&M-EST. PATIENT-LVL V: CPT | Mod: PBBFAC,,, | Performed by: THORACIC SURGERY (CARDIOTHORACIC VASCULAR SURGERY)

## 2021-12-14 PROCEDURE — 99024 POSTOP FOLLOW-UP VISIT: CPT | Mod: S$GLB,,, | Performed by: THORACIC SURGERY (CARDIOTHORACIC VASCULAR SURGERY)

## 2021-12-14 PROCEDURE — 99024 PR POST-OP FOLLOW-UP VISIT: ICD-10-PCS | Mod: S$GLB,,, | Performed by: THORACIC SURGERY (CARDIOTHORACIC VASCULAR SURGERY)

## 2021-12-14 RX ORDER — ERENUMAB-AOOE 140 MG/ML
1 INJECTION, SOLUTION SUBCUTANEOUS
COMMUNITY
End: 2021-12-29

## 2021-12-14 RX ORDER — BUTALBITAL, ASPIRIN, AND CAFFEINE 325; 50; 40 MG/1; MG/1; MG/1
CAPSULE ORAL
COMMUNITY
End: 2021-12-29

## 2021-12-14 RX ORDER — UBROGEPANT 50 MG/1
TABLET ORAL
COMMUNITY
End: 2022-01-25

## 2021-12-14 RX ORDER — ONDANSETRON 4 MG/1
8 TABLET, ORALLY DISINTEGRATING ORAL EVERY 6 HOURS PRN
Qty: 30 TABLET | Refills: 1 | Status: SHIPPED | OUTPATIENT
Start: 2021-12-14 | End: 2022-01-25

## 2021-12-14 RX ORDER — PROPRANOLOL HYDROCHLORIDE 20 MG/1
20 TABLET ORAL 2 TIMES DAILY
COMMUNITY
Start: 2021-12-10 | End: 2021-12-29

## 2021-12-14 RX ORDER — PRAVASTATIN SODIUM 10 MG/1
TABLET ORAL
COMMUNITY
End: 2021-12-29

## 2021-12-14 RX ORDER — ALENDRONATE SODIUM 70 MG/1
TABLET ORAL
COMMUNITY
End: 2021-12-29

## 2022-01-13 ENCOUNTER — LAB VISIT (OUTPATIENT)
Dept: PRIMARY CARE CLINIC | Facility: CLINIC | Age: 62
End: 2022-01-13
Payer: COMMERCIAL

## 2022-01-13 DIAGNOSIS — Z20.822 CONTACT WITH AND (SUSPECTED) EXPOSURE TO COVID-19: ICD-10-CM

## 2022-01-13 PROBLEM — K91.2 POSTSURGICAL MALABSORPTION: Status: RESOLVED | Noted: 2019-07-30 | Resolved: 2022-01-13

## 2022-01-13 PROBLEM — G47.30 HYPERSOMNIA WITH SLEEP APNEA: Status: ACTIVE | Noted: 2022-01-13

## 2022-01-13 PROBLEM — G47.10 HYPERSOMNIA WITH SLEEP APNEA: Status: ACTIVE | Noted: 2022-01-13

## 2022-01-13 PROBLEM — M75.102 ROTATOR CUFF SYNDROME, LEFT: Status: RESOLVED | Noted: 2020-08-10 | Resolved: 2022-01-13

## 2022-01-13 PROBLEM — M75.101 ROTATOR CUFF SYNDROME, RIGHT: Status: RESOLVED | Noted: 2021-05-10 | Resolved: 2022-01-13

## 2022-01-13 PROBLEM — U07.1 COVID-19: Status: ACTIVE | Noted: 2022-01-13

## 2022-01-13 PROBLEM — Z95.1 S/P CABG X 3: Chronic | Status: ACTIVE | Noted: 2021-11-15

## 2022-01-13 PROBLEM — D62 ACUTE BLOOD LOSS ANEMIA: Status: RESOLVED | Noted: 2021-11-19 | Resolved: 2022-01-13

## 2022-01-13 LAB
CTP QC/QA: YES
SARS-COV-2 AG RESP QL IA.RAPID: POSITIVE

## 2022-01-13 PROCEDURE — 87811 SARS-COV-2 COVID19 W/OPTIC: CPT

## 2022-01-14 PROBLEM — U07.1 COVID-19: Chronic | Status: ACTIVE | Noted: 2022-01-13

## 2022-08-31 PROBLEM — T14.8XXA MUSCLE STRAIN: Status: ACTIVE | Noted: 2022-08-31

## 2023-01-27 ENCOUNTER — PATIENT MESSAGE (OUTPATIENT)
Dept: PSYCHIATRY | Facility: CLINIC | Age: 63
End: 2023-01-27
Payer: COMMERCIAL

## 2023-03-09 ENCOUNTER — OFFICE VISIT (OUTPATIENT)
Dept: PSYCHIATRY | Facility: CLINIC | Age: 63
End: 2023-03-09
Payer: COMMERCIAL

## 2023-03-09 VITALS
BODY MASS INDEX: 33.55 KG/M2 | HEIGHT: 63 IN | HEART RATE: 66 BPM | DIASTOLIC BLOOD PRESSURE: 80 MMHG | SYSTOLIC BLOOD PRESSURE: 130 MMHG | WEIGHT: 189.38 LBS

## 2023-03-09 DIAGNOSIS — F33.2 SEVERE EPISODE OF RECURRENT MAJOR DEPRESSIVE DISORDER, WITHOUT PSYCHOTIC FEATURES: Primary | ICD-10-CM

## 2023-03-09 DIAGNOSIS — F41.1 GENERALIZED ANXIETY DISORDER: ICD-10-CM

## 2023-03-09 DIAGNOSIS — G47.00 INSOMNIA, UNSPECIFIED TYPE: ICD-10-CM

## 2023-03-09 PROCEDURE — 1159F PR MEDICATION LIST DOCUMENTED IN MEDICAL RECORD: ICD-10-PCS | Mod: CPTII,S$GLB,,

## 2023-03-09 PROCEDURE — 3075F PR MOST RECENT SYSTOLIC BLOOD PRESS GE 130-139MM HG: ICD-10-PCS | Mod: CPTII,S$GLB,,

## 2023-03-09 PROCEDURE — 99999 PR PBB SHADOW E&M-EST. PATIENT-LVL V: ICD-10-PCS | Mod: PBBFAC,,,

## 2023-03-09 PROCEDURE — 99999 PR PBB SHADOW E&M-EST. PATIENT-LVL V: CPT | Mod: PBBFAC,,,

## 2023-03-09 PROCEDURE — 3008F PR BODY MASS INDEX (BMI) DOCUMENTED: ICD-10-PCS | Mod: CPTII,S$GLB,,

## 2023-03-09 PROCEDURE — 1160F RVW MEDS BY RX/DR IN RCRD: CPT | Mod: CPTII,S$GLB,,

## 2023-03-09 PROCEDURE — 1160F PR REVIEW ALL MEDS BY PRESCRIBER/CLIN PHARMACIST DOCUMENTED: ICD-10-PCS | Mod: CPTII,S$GLB,,

## 2023-03-09 PROCEDURE — 4010F ACE/ARB THERAPY RXD/TAKEN: CPT | Mod: CPTII,S$GLB,,

## 2023-03-09 PROCEDURE — 4010F PR ACE/ARB THEARPY RXD/TAKEN: ICD-10-PCS | Mod: CPTII,S$GLB,,

## 2023-03-09 PROCEDURE — 3075F SYST BP GE 130 - 139MM HG: CPT | Mod: CPTII,S$GLB,,

## 2023-03-09 PROCEDURE — 1159F MED LIST DOCD IN RCRD: CPT | Mod: CPTII,S$GLB,,

## 2023-03-09 PROCEDURE — 90792 PSYCH DIAG EVAL W/MED SRVCS: CPT | Mod: S$GLB,,,

## 2023-03-09 PROCEDURE — 3079F DIAST BP 80-89 MM HG: CPT | Mod: CPTII,S$GLB,,

## 2023-03-09 PROCEDURE — 3044F HG A1C LEVEL LT 7.0%: CPT | Mod: CPTII,S$GLB,,

## 2023-03-09 PROCEDURE — 3008F BODY MASS INDEX DOCD: CPT | Mod: CPTII,S$GLB,,

## 2023-03-09 PROCEDURE — 90792 PR PSYCHIATRIC DIAGNOSTIC EVALUATION W/MEDICAL SERVICES: ICD-10-PCS | Mod: S$GLB,,,

## 2023-03-09 PROCEDURE — 3044F PR MOST RECENT HEMOGLOBIN A1C LEVEL <7.0%: ICD-10-PCS | Mod: CPTII,S$GLB,,

## 2023-03-09 PROCEDURE — 3079F PR MOST RECENT DIASTOLIC BLOOD PRESSURE 80-89 MM HG: ICD-10-PCS | Mod: CPTII,S$GLB,,

## 2023-03-09 RX ORDER — BUPROPION HYDROCHLORIDE 150 MG/1
150 TABLET ORAL DAILY
Qty: 30 TABLET | Refills: 2 | Status: SHIPPED | OUTPATIENT
Start: 2023-03-09 | End: 2023-03-30

## 2023-03-09 RX ORDER — HYDROXYZINE PAMOATE 25 MG/1
25 CAPSULE ORAL 3 TIMES DAILY
Qty: 90 CAPSULE | Refills: 2 | Status: SHIPPED | OUTPATIENT
Start: 2023-03-09 | End: 2023-03-30

## 2023-03-09 RX ORDER — PAROXETINE 30 MG/1
30 TABLET, FILM COATED ORAL EVERY MORNING
Qty: 30 TABLET | Refills: 2 | Status: SHIPPED | OUTPATIENT
Start: 2023-03-09 | End: 2023-05-11

## 2023-03-09 RX ORDER — CETIRIZINE HYDROCHLORIDE 10 MG/1
10 TABLET ORAL DAILY
COMMUNITY
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
## 2023-04-10 ENCOUNTER — PATIENT MESSAGE (OUTPATIENT)
Dept: PSYCHIATRY | Facility: CLINIC | Age: 63
End: 2023-04-10
Payer: COMMERCIAL

## 2023-04-11 ENCOUNTER — OFFICE VISIT (OUTPATIENT)
Dept: PSYCHIATRY | Facility: CLINIC | Age: 63
End: 2023-04-11
Payer: COMMERCIAL

## 2023-04-11 VITALS
HEART RATE: 71 BPM | HEIGHT: 63 IN | SYSTOLIC BLOOD PRESSURE: 95 MMHG | BODY MASS INDEX: 32.78 KG/M2 | WEIGHT: 185 LBS | DIASTOLIC BLOOD PRESSURE: 61 MMHG

## 2023-04-11 DIAGNOSIS — F33.2 SEVERE EPISODE OF RECURRENT MAJOR DEPRESSIVE DISORDER, WITHOUT PSYCHOTIC FEATURES: Primary | ICD-10-CM

## 2023-04-11 DIAGNOSIS — F41.1 GENERALIZED ANXIETY DISORDER: ICD-10-CM

## 2023-04-11 DIAGNOSIS — G47.00 INSOMNIA, UNSPECIFIED TYPE: ICD-10-CM

## 2023-04-11 PROCEDURE — 99999 PR PBB SHADOW E&M-EST. PATIENT-LVL V: ICD-10-PCS | Mod: PBBFAC,,,

## 2023-04-11 PROCEDURE — 3074F SYST BP LT 130 MM HG: CPT | Mod: CPTII,S$GLB,,

## 2023-04-11 PROCEDURE — 1159F MED LIST DOCD IN RCRD: CPT | Mod: CPTII,S$GLB,,

## 2023-04-11 PROCEDURE — 1159F PR MEDICATION LIST DOCUMENTED IN MEDICAL RECORD: ICD-10-PCS | Mod: CPTII,S$GLB,,

## 2023-04-11 PROCEDURE — 99214 PR OFFICE/OUTPT VISIT, EST, LEVL IV, 30-39 MIN: ICD-10-PCS | Mod: S$GLB,,,

## 2023-04-11 PROCEDURE — 99999 PR PBB SHADOW E&M-EST. PATIENT-LVL V: CPT | Mod: PBBFAC,,,

## 2023-04-11 PROCEDURE — 1160F PR REVIEW ALL MEDS BY PRESCRIBER/CLIN PHARMACIST DOCUMENTED: ICD-10-PCS | Mod: CPTII,S$GLB,,

## 2023-04-11 PROCEDURE — 4010F PR ACE/ARB THEARPY RXD/TAKEN: ICD-10-PCS | Mod: CPTII,S$GLB,,

## 2023-04-11 PROCEDURE — 3008F PR BODY MASS INDEX (BMI) DOCUMENTED: ICD-10-PCS | Mod: CPTII,S$GLB,,

## 2023-04-11 PROCEDURE — 99214 OFFICE O/P EST MOD 30 MIN: CPT | Mod: S$GLB,,,

## 2023-04-11 PROCEDURE — 3074F PR MOST RECENT SYSTOLIC BLOOD PRESSURE < 130 MM HG: ICD-10-PCS | Mod: CPTII,S$GLB,,

## 2023-04-11 PROCEDURE — 3044F PR MOST RECENT HEMOGLOBIN A1C LEVEL <7.0%: ICD-10-PCS | Mod: CPTII,S$GLB,,

## 2023-04-11 PROCEDURE — 3008F BODY MASS INDEX DOCD: CPT | Mod: CPTII,S$GLB,,

## 2023-04-11 PROCEDURE — 3078F DIAST BP <80 MM HG: CPT | Mod: CPTII,S$GLB,,

## 2023-04-11 PROCEDURE — 3044F HG A1C LEVEL LT 7.0%: CPT | Mod: CPTII,S$GLB,,

## 2023-04-11 PROCEDURE — 3078F PR MOST RECENT DIASTOLIC BLOOD PRESSURE < 80 MM HG: ICD-10-PCS | Mod: CPTII,S$GLB,,

## 2023-04-11 PROCEDURE — 1160F RVW MEDS BY RX/DR IN RCRD: CPT | Mod: CPTII,S$GLB,,

## 2023-04-11 PROCEDURE — 4010F ACE/ARB THERAPY RXD/TAKEN: CPT | Mod: CPTII,S$GLB,,
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
## 2023-05-11 ENCOUNTER — OFFICE VISIT (OUTPATIENT)
Dept: PSYCHIATRY | Facility: CLINIC | Age: 63
End: 2023-05-11
Payer: COMMERCIAL

## 2023-05-11 VITALS
BODY MASS INDEX: 30.82 KG/M2 | DIASTOLIC BLOOD PRESSURE: 65 MMHG | HEIGHT: 65 IN | WEIGHT: 185 LBS | HEART RATE: 69 BPM | SYSTOLIC BLOOD PRESSURE: 96 MMHG

## 2023-05-11 DIAGNOSIS — F41.1 GENERALIZED ANXIETY DISORDER: ICD-10-CM

## 2023-05-11 DIAGNOSIS — F33.2 SEVERE EPISODE OF RECURRENT MAJOR DEPRESSIVE DISORDER, WITHOUT PSYCHOTIC FEATURES: Primary | ICD-10-CM

## 2023-05-11 DIAGNOSIS — G47.00 INSOMNIA, UNSPECIFIED TYPE: ICD-10-CM

## 2023-05-11 PROCEDURE — 3074F PR MOST RECENT SYSTOLIC BLOOD PRESSURE < 130 MM HG: ICD-10-PCS | Mod: CPTII,S$GLB,,

## 2023-05-11 PROCEDURE — 4010F ACE/ARB THERAPY RXD/TAKEN: CPT | Mod: CPTII,S$GLB,,

## 2023-05-11 PROCEDURE — 1159F PR MEDICATION LIST DOCUMENTED IN MEDICAL RECORD: ICD-10-PCS | Mod: CPTII,S$GLB,,

## 2023-05-11 PROCEDURE — 1160F RVW MEDS BY RX/DR IN RCRD: CPT | Mod: CPTII,S$GLB,,

## 2023-05-11 PROCEDURE — 3044F PR MOST RECENT HEMOGLOBIN A1C LEVEL <7.0%: ICD-10-PCS | Mod: CPTII,S$GLB,,

## 2023-05-11 PROCEDURE — 3078F DIAST BP <80 MM HG: CPT | Mod: CPTII,S$GLB,,

## 2023-05-11 PROCEDURE — 3008F BODY MASS INDEX DOCD: CPT | Mod: CPTII,S$GLB,,

## 2023-05-11 PROCEDURE — 1160F PR REVIEW ALL MEDS BY PRESCRIBER/CLIN PHARMACIST DOCUMENTED: ICD-10-PCS | Mod: CPTII,S$GLB,,

## 2023-05-11 PROCEDURE — 99999 PR PBB SHADOW E&M-EST. PATIENT-LVL V: CPT | Mod: PBBFAC,,,

## 2023-05-11 PROCEDURE — 3008F PR BODY MASS INDEX (BMI) DOCUMENTED: ICD-10-PCS | Mod: CPTII,S$GLB,,

## 2023-05-11 PROCEDURE — 99214 PR OFFICE/OUTPT VISIT, EST, LEVL IV, 30-39 MIN: ICD-10-PCS | Mod: S$GLB,,,

## 2023-05-11 PROCEDURE — 99214 OFFICE O/P EST MOD 30 MIN: CPT | Mod: S$GLB,,,

## 2023-05-11 PROCEDURE — 3044F HG A1C LEVEL LT 7.0%: CPT | Mod: CPTII,S$GLB,,

## 2023-05-11 PROCEDURE — 3078F PR MOST RECENT DIASTOLIC BLOOD PRESSURE < 80 MM HG: ICD-10-PCS | Mod: CPTII,S$GLB,,

## 2023-05-11 PROCEDURE — 4010F PR ACE/ARB THEARPY RXD/TAKEN: ICD-10-PCS | Mod: CPTII,S$GLB,,

## 2023-05-11 PROCEDURE — 3074F SYST BP LT 130 MM HG: CPT | Mod: CPTII,S$GLB,,

## 2023-05-11 PROCEDURE — 99999 PR PBB SHADOW E&M-EST. PATIENT-LVL V: ICD-10-PCS | Mod: PBBFAC,,,

## 2023-05-11 PROCEDURE — 1159F MED LIST DOCD IN RCRD: CPT | Mod: CPTII,S$GLB,,

## 2023-05-11 RX ORDER — PAROXETINE HYDROCHLORIDE 20 MG/1
20 TABLET, FILM COATED ORAL EVERY MORNING
Qty: 30 TABLET | Refills: 2 | Status: SHIPPED | OUTPATIENT
Start: 2023-05-11 | End: 2023-06-13 | Stop reason: ALTCHOICE

## 2023-05-11 RX ORDER — BUPROPION HYDROCHLORIDE 150 MG/1
300 TABLET ORAL DAILY
Qty: 180 TABLET | Refills: 1 | Status: SHIPPED | OUTPATIENT
Start: 2023-05-11 | End: 2023-07-17 | Stop reason: ALTCHOICE

## 2023-05-22 ENCOUNTER — LAB VISIT (OUTPATIENT)
Dept: LAB | Facility: HOSPITAL | Age: 63
End: 2023-05-22
Payer: COMMERCIAL

## 2023-05-22 DIAGNOSIS — F33.2 SEVERE EPISODE OF RECURRENT MAJOR DEPRESSIVE DISORDER, WITHOUT PSYCHOTIC FEATURES: ICD-10-CM

## 2023-05-22 PROCEDURE — 36415 COLL VENOUS BLD VENIPUNCTURE: CPT | Mod: PO

## 2023-05-23 ENCOUNTER — OFFICE VISIT (OUTPATIENT)
Dept: PSYCHIATRY | Facility: CLINIC | Age: 63
End: 2023-05-23
Payer: COMMERCIAL

## 2023-05-23 DIAGNOSIS — F33.0 MDD (MAJOR DEPRESSIVE DISORDER), RECURRENT EPISODE, MILD: Primary | ICD-10-CM

## 2023-05-23 DIAGNOSIS — F41.1 GAD (GENERALIZED ANXIETY DISORDER): ICD-10-CM

## 2023-05-23 PROCEDURE — 3044F PR MOST RECENT HEMOGLOBIN A1C LEVEL <7.0%: ICD-10-PCS | Mod: CPTII,S$GLB,, | Performed by: SOCIAL WORKER

## 2023-05-23 PROCEDURE — 3044F HG A1C LEVEL LT 7.0%: CPT | Mod: CPTII,S$GLB,, | Performed by: SOCIAL WORKER

## 2023-05-23 PROCEDURE — 4010F PR ACE/ARB THEARPY RXD/TAKEN: ICD-10-PCS | Mod: CPTII,S$GLB,, | Performed by: SOCIAL WORKER

## 2023-05-23 PROCEDURE — 90791 PSYCH DIAGNOSTIC EVALUATION: CPT | Mod: S$GLB,,, | Performed by: SOCIAL WORKER

## 2023-05-23 PROCEDURE — 4010F ACE/ARB THERAPY RXD/TAKEN: CPT | Mod: CPTII,S$GLB,, | Performed by: SOCIAL WORKER

## 2023-05-23 PROCEDURE — 90791 PR PSYCHIATRIC DIAGNOSTIC EVALUATION: ICD-10-PCS | Mod: S$GLB,,, | Performed by: SOCIAL WORKER
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
## 2023-05-31 LAB
ONEOME COMMENT: NORMAL
ONEOME METHOD: NORMAL

## 2023-06-13 ENCOUNTER — OFFICE VISIT (OUTPATIENT)
Dept: PSYCHIATRY | Facility: CLINIC | Age: 63
End: 2023-06-13
Payer: COMMERCIAL

## 2023-06-13 ENCOUNTER — PATIENT MESSAGE (OUTPATIENT)
Dept: PSYCHIATRY | Facility: CLINIC | Age: 63
End: 2023-06-13
Payer: COMMERCIAL

## 2023-06-13 DIAGNOSIS — F41.1 GAD (GENERALIZED ANXIETY DISORDER): ICD-10-CM

## 2023-06-13 DIAGNOSIS — F33.0 MDD (MAJOR DEPRESSIVE DISORDER), RECURRENT EPISODE, MILD: Primary | ICD-10-CM

## 2023-06-13 DIAGNOSIS — G47.00 INSOMNIA, UNSPECIFIED TYPE: ICD-10-CM

## 2023-06-13 PROCEDURE — 3044F PR MOST RECENT HEMOGLOBIN A1C LEVEL <7.0%: ICD-10-PCS | Mod: CPTII,95,,

## 2023-06-13 PROCEDURE — 99214 OFFICE O/P EST MOD 30 MIN: CPT | Mod: 95,,,

## 2023-06-13 PROCEDURE — 1159F MED LIST DOCD IN RCRD: CPT | Mod: CPTII,95,,

## 2023-06-13 PROCEDURE — 3044F HG A1C LEVEL LT 7.0%: CPT | Mod: CPTII,95,,

## 2023-06-13 PROCEDURE — 1159F PR MEDICATION LIST DOCUMENTED IN MEDICAL RECORD: ICD-10-PCS | Mod: CPTII,95,,

## 2023-06-13 PROCEDURE — 1160F PR REVIEW ALL MEDS BY PRESCRIBER/CLIN PHARMACIST DOCUMENTED: ICD-10-PCS | Mod: CPTII,95,,

## 2023-06-13 PROCEDURE — 4010F PR ACE/ARB THEARPY RXD/TAKEN: ICD-10-PCS | Mod: CPTII,95,,

## 2023-06-13 PROCEDURE — 1160F RVW MEDS BY RX/DR IN RCRD: CPT | Mod: CPTII,95,,

## 2023-06-13 PROCEDURE — 99214 PR OFFICE/OUTPT VISIT, EST, LEVL IV, 30-39 MIN: ICD-10-PCS | Mod: 95,,,

## 2023-06-13 PROCEDURE — 4010F ACE/ARB THERAPY RXD/TAKEN: CPT | Mod: CPTII,95,,

## 2023-06-13 RX ORDER — VILAZODONE HYDROCHLORIDE 20 MG/1
20 TABLET ORAL DAILY
Qty: 30 TABLET | Refills: 2 | Status: SHIPPED | OUTPATIENT
Start: 2023-06-13 | End: 2023-09-05

## 2023-06-13 RX ORDER — VILAZODONE HYDROCHLORIDE 10 MG/1
10 TABLET ORAL DAILY
Qty: 7 TABLET | Refills: 0 | Status: SHIPPED | OUTPATIENT
Start: 2023-06-13 | End: 2023-07-17
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
## 2023-06-27 ENCOUNTER — OFFICE VISIT (OUTPATIENT)
Dept: PSYCHIATRY | Facility: CLINIC | Age: 63
End: 2023-06-27
Payer: COMMERCIAL

## 2023-06-27 DIAGNOSIS — F33.0 MDD (MAJOR DEPRESSIVE DISORDER), RECURRENT EPISODE, MILD: Primary | ICD-10-CM

## 2023-06-27 DIAGNOSIS — F41.1 GAD (GENERALIZED ANXIETY DISORDER): ICD-10-CM

## 2023-06-27 PROCEDURE — 90832 PSYTX W PT 30 MINUTES: CPT | Mod: S$GLB,,, | Performed by: SOCIAL WORKER

## 2023-06-27 PROCEDURE — 3044F HG A1C LEVEL LT 7.0%: CPT | Mod: CPTII,S$GLB,, | Performed by: SOCIAL WORKER

## 2023-06-27 PROCEDURE — 90832 PR PSYCHOTHERAPY W/PATIENT, 30 MIN: ICD-10-PCS | Mod: S$GLB,,, | Performed by: SOCIAL WORKER

## 2023-06-27 PROCEDURE — 3044F PR MOST RECENT HEMOGLOBIN A1C LEVEL <7.0%: ICD-10-PCS | Mod: CPTII,S$GLB,, | Performed by: SOCIAL WORKER

## 2023-06-27 PROCEDURE — 4010F PR ACE/ARB THEARPY RXD/TAKEN: ICD-10-PCS | Mod: CPTII,S$GLB,, | Performed by: SOCIAL WORKER

## 2023-06-27 PROCEDURE — 4010F ACE/ARB THERAPY RXD/TAKEN: CPT | Mod: CPTII,S$GLB,, | Performed by: SOCIAL WORKER
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
## 2023-07-13 ENCOUNTER — PATIENT MESSAGE (OUTPATIENT)
Dept: PSYCHIATRY | Facility: CLINIC | Age: 63
End: 2023-07-13
Payer: COMMERCIAL

## 2023-07-14 RX ORDER — HYDROXYZINE PAMOATE 25 MG/1
CAPSULE ORAL
Qty: 270 CAPSULE | Refills: 0 | Status: SHIPPED | OUTPATIENT
Start: 2023-07-14 | End: 2023-11-27

## 2023-07-17 ENCOUNTER — OFFICE VISIT (OUTPATIENT)
Dept: PSYCHIATRY | Facility: CLINIC | Age: 63
End: 2023-07-17
Payer: COMMERCIAL

## 2023-07-17 DIAGNOSIS — G47.00 INSOMNIA, UNSPECIFIED TYPE: ICD-10-CM

## 2023-07-17 DIAGNOSIS — F41.1 GAD (GENERALIZED ANXIETY DISORDER): ICD-10-CM

## 2023-07-17 DIAGNOSIS — F33.0 MDD (MAJOR DEPRESSIVE DISORDER), RECURRENT EPISODE, MILD: Primary | ICD-10-CM

## 2023-07-17 PROCEDURE — 3044F HG A1C LEVEL LT 7.0%: CPT | Mod: CPTII,95,,

## 2023-07-17 PROCEDURE — 99215 PR OFFICE/OUTPT VISIT, EST, LEVL V, 40-54 MIN: ICD-10-PCS | Mod: 95,,,

## 2023-07-17 PROCEDURE — 1159F PR MEDICATION LIST DOCUMENTED IN MEDICAL RECORD: ICD-10-PCS | Mod: CPTII,95,,

## 2023-07-17 PROCEDURE — 1160F RVW MEDS BY RX/DR IN RCRD: CPT | Mod: CPTII,95,,

## 2023-07-17 PROCEDURE — 4010F PR ACE/ARB THEARPY RXD/TAKEN: ICD-10-PCS | Mod: CPTII,95,,

## 2023-07-17 PROCEDURE — 1159F MED LIST DOCD IN RCRD: CPT | Mod: CPTII,95,,

## 2023-07-17 PROCEDURE — 4010F ACE/ARB THERAPY RXD/TAKEN: CPT | Mod: CPTII,95,,

## 2023-07-17 PROCEDURE — 1160F PR REVIEW ALL MEDS BY PRESCRIBER/CLIN PHARMACIST DOCUMENTED: ICD-10-PCS | Mod: CPTII,95,,

## 2023-07-17 PROCEDURE — 99215 OFFICE O/P EST HI 40 MIN: CPT | Mod: 95,,,

## 2023-07-17 PROCEDURE — 3044F PR MOST RECENT HEMOGLOBIN A1C LEVEL <7.0%: ICD-10-PCS | Mod: CPTII,95,,

## 2023-07-17 RX ORDER — BUPROPION HYDROCHLORIDE 75 MG/1
75 TABLET ORAL EVERY MORNING
Qty: 30 TABLET | Refills: 2 | Status: SHIPPED | OUTPATIENT
Start: 2023-07-17 | End: 2023-08-29

## 2023-07-17 RX ORDER — TRAZODONE HYDROCHLORIDE 50 MG/1
50-100 TABLET ORAL NIGHTLY
Qty: 60 TABLET | Refills: 2 | Status: SHIPPED | OUTPATIENT
Start: 2023-07-17 | End: 2023-10-30
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
## 2023-08-23 ENCOUNTER — OFFICE VISIT (OUTPATIENT)
Dept: PSYCHIATRY | Facility: CLINIC | Age: 63
End: 2023-08-23
Payer: COMMERCIAL

## 2023-08-23 DIAGNOSIS — F50.81 BINGE EATING DISORDER: ICD-10-CM

## 2023-08-23 DIAGNOSIS — F41.1 GENERALIZED ANXIETY DISORDER: ICD-10-CM

## 2023-08-23 DIAGNOSIS — G47.00 INSOMNIA, UNSPECIFIED TYPE: ICD-10-CM

## 2023-08-23 DIAGNOSIS — F33.41 RECURRENT MAJOR DEPRESSIVE DISORDER, IN PARTIAL REMISSION: Primary | ICD-10-CM

## 2023-08-23 PROCEDURE — 4010F PR ACE/ARB THEARPY RXD/TAKEN: ICD-10-PCS | Mod: CPTII,95,,

## 2023-08-23 PROCEDURE — 1159F MED LIST DOCD IN RCRD: CPT | Mod: CPTII,95,,

## 2023-08-23 PROCEDURE — 4010F ACE/ARB THERAPY RXD/TAKEN: CPT | Mod: CPTII,95,,

## 2023-08-23 PROCEDURE — 99214 OFFICE O/P EST MOD 30 MIN: CPT | Mod: 95,,,

## 2023-08-23 PROCEDURE — 1160F PR REVIEW ALL MEDS BY PRESCRIBER/CLIN PHARMACIST DOCUMENTED: ICD-10-PCS | Mod: CPTII,95,,

## 2023-08-23 PROCEDURE — 1159F PR MEDICATION LIST DOCUMENTED IN MEDICAL RECORD: ICD-10-PCS | Mod: CPTII,95,,

## 2023-08-23 PROCEDURE — 3044F PR MOST RECENT HEMOGLOBIN A1C LEVEL <7.0%: ICD-10-PCS | Mod: CPTII,95,,

## 2023-08-23 PROCEDURE — 99214 PR OFFICE/OUTPT VISIT, EST, LEVL IV, 30-39 MIN: ICD-10-PCS | Mod: 95,,,

## 2023-08-23 PROCEDURE — 3044F HG A1C LEVEL LT 7.0%: CPT | Mod: CPTII,95,,

## 2023-08-23 PROCEDURE — 1160F RVW MEDS BY RX/DR IN RCRD: CPT | Mod: CPTII,95,,
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
## 2023-08-29 RX ORDER — TRAZODONE HYDROCHLORIDE 50 MG/1
50-100 TABLET ORAL NIGHTLY
Qty: 180 TABLET | OUTPATIENT
Start: 2023-08-29 | End: 2024-08-28

## 2023-08-29 RX ORDER — PAROXETINE 30 MG/1
30 TABLET, FILM COATED ORAL EVERY MORNING
Qty: 90 TABLET | OUTPATIENT
Start: 2023-08-29

## 2023-08-29 RX ORDER — BUPROPION HYDROCHLORIDE 75 MG/1
75 TABLET ORAL EVERY MORNING
Qty: 90 TABLET | Refills: 0 | Status: SHIPPED | OUTPATIENT
Start: 2023-08-29 | End: 2023-12-04 | Stop reason: SDUPTHER

## 2023-08-29 NOTE — TELEPHONE ENCOUNTER
Last fill for wellbutrin and trazodone are 7/17/23   Not seeing paxil on active medication list.   Nov none   Lov 8/23/23

## 2023-09-03 ENCOUNTER — PATIENT MESSAGE (OUTPATIENT)
Dept: PSYCHIATRY | Facility: CLINIC | Age: 63
End: 2023-09-03
Payer: COMMERCIAL

## 2023-09-05 ENCOUNTER — OFFICE VISIT (OUTPATIENT)
Dept: PSYCHIATRY | Facility: CLINIC | Age: 63
End: 2023-09-05
Payer: COMMERCIAL

## 2023-09-05 DIAGNOSIS — F50.81 BINGE EATING DISORDER: ICD-10-CM

## 2023-09-05 DIAGNOSIS — F33.41 RECURRENT MAJOR DEPRESSIVE DISORDER, IN PARTIAL REMISSION: Primary | ICD-10-CM

## 2023-09-05 DIAGNOSIS — F41.1 GENERALIZED ANXIETY DISORDER: ICD-10-CM

## 2023-09-05 DIAGNOSIS — G47.00 INSOMNIA, UNSPECIFIED TYPE: ICD-10-CM

## 2023-09-05 PROCEDURE — 1159F PR MEDICATION LIST DOCUMENTED IN MEDICAL RECORD: ICD-10-PCS | Mod: CPTII,95,,

## 2023-09-05 PROCEDURE — 4010F PR ACE/ARB THEARPY RXD/TAKEN: ICD-10-PCS | Mod: CPTII,95,,

## 2023-09-05 PROCEDURE — 4010F ACE/ARB THERAPY RXD/TAKEN: CPT | Mod: CPTII,95,,

## 2023-09-05 PROCEDURE — 1160F RVW MEDS BY RX/DR IN RCRD: CPT | Mod: CPTII,95,,

## 2023-09-05 PROCEDURE — 3044F PR MOST RECENT HEMOGLOBIN A1C LEVEL <7.0%: ICD-10-PCS | Mod: CPTII,95,,

## 2023-09-05 PROCEDURE — 1160F PR REVIEW ALL MEDS BY PRESCRIBER/CLIN PHARMACIST DOCUMENTED: ICD-10-PCS | Mod: CPTII,95,,

## 2023-09-05 PROCEDURE — 3044F HG A1C LEVEL LT 7.0%: CPT | Mod: CPTII,95,,

## 2023-09-05 PROCEDURE — 99214 OFFICE O/P EST MOD 30 MIN: CPT | Mod: 95,,,

## 2023-09-05 PROCEDURE — 1159F MED LIST DOCD IN RCRD: CPT | Mod: CPTII,95,,

## 2023-09-05 PROCEDURE — 99214 PR OFFICE/OUTPT VISIT, EST, LEVL IV, 30-39 MIN: ICD-10-PCS | Mod: 95,,,

## 2023-09-05 RX ORDER — VILAZODONE HYDROCHLORIDE 40 MG/1
40 TABLET ORAL DAILY
Qty: 30 TABLET | Refills: 2 | Status: SHIPPED | OUTPATIENT
Start: 2023-09-05 | End: 2023-10-13
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
## 2023-10-06 ENCOUNTER — OFFICE VISIT (OUTPATIENT)
Dept: NEPHROLOGY | Facility: CLINIC | Age: 63
End: 2023-10-06
Payer: COMMERCIAL

## 2023-10-06 VITALS — HEIGHT: 63 IN | BODY MASS INDEX: 31 KG/M2 | DIASTOLIC BLOOD PRESSURE: 60 MMHG | SYSTOLIC BLOOD PRESSURE: 122 MMHG

## 2023-10-06 DIAGNOSIS — N28.1 KIDNEY CYST, ACQUIRED: Primary | ICD-10-CM

## 2023-10-06 PROCEDURE — 3078F PR MOST RECENT DIASTOLIC BLOOD PRESSURE < 80 MM HG: ICD-10-PCS | Mod: CPTII,S$GLB,, | Performed by: INTERNAL MEDICINE

## 2023-10-06 PROCEDURE — 3078F DIAST BP <80 MM HG: CPT | Mod: CPTII,S$GLB,, | Performed by: INTERNAL MEDICINE

## 2023-10-06 PROCEDURE — 1160F PR REVIEW ALL MEDS BY PRESCRIBER/CLIN PHARMACIST DOCUMENTED: ICD-10-PCS | Mod: CPTII,S$GLB,, | Performed by: INTERNAL MEDICINE

## 2023-10-06 PROCEDURE — 1159F PR MEDICATION LIST DOCUMENTED IN MEDICAL RECORD: ICD-10-PCS | Mod: CPTII,S$GLB,, | Performed by: INTERNAL MEDICINE

## 2023-10-06 PROCEDURE — 3074F SYST BP LT 130 MM HG: CPT | Mod: CPTII,S$GLB,, | Performed by: INTERNAL MEDICINE

## 2023-10-06 PROCEDURE — 3008F PR BODY MASS INDEX (BMI) DOCUMENTED: ICD-10-PCS | Mod: CPTII,S$GLB,, | Performed by: INTERNAL MEDICINE

## 2023-10-06 PROCEDURE — 4010F PR ACE/ARB THEARPY RXD/TAKEN: ICD-10-PCS | Mod: CPTII,S$GLB,, | Performed by: INTERNAL MEDICINE

## 2023-10-06 PROCEDURE — 3044F PR MOST RECENT HEMOGLOBIN A1C LEVEL <7.0%: ICD-10-PCS | Mod: CPTII,S$GLB,, | Performed by: INTERNAL MEDICINE

## 2023-10-06 PROCEDURE — 99204 OFFICE O/P NEW MOD 45 MIN: CPT | Mod: S$GLB,,, | Performed by: INTERNAL MEDICINE

## 2023-10-06 PROCEDURE — 3066F NEPHROPATHY DOC TX: CPT | Mod: CPTII,S$GLB,, | Performed by: INTERNAL MEDICINE

## 2023-10-06 PROCEDURE — 1159F MED LIST DOCD IN RCRD: CPT | Mod: CPTII,S$GLB,, | Performed by: INTERNAL MEDICINE

## 2023-10-06 PROCEDURE — 3074F PR MOST RECENT SYSTOLIC BLOOD PRESSURE < 130 MM HG: ICD-10-PCS | Mod: CPTII,S$GLB,, | Performed by: INTERNAL MEDICINE

## 2023-10-06 PROCEDURE — 99999 PR PBB SHADOW E&M-EST. PATIENT-LVL V: CPT | Mod: PBBFAC,,, | Performed by: INTERNAL MEDICINE

## 2023-10-06 PROCEDURE — 99999 PR PBB SHADOW E&M-EST. PATIENT-LVL V: ICD-10-PCS | Mod: PBBFAC,,, | Performed by: INTERNAL MEDICINE

## 2023-10-06 PROCEDURE — 4010F ACE/ARB THERAPY RXD/TAKEN: CPT | Mod: CPTII,S$GLB,, | Performed by: INTERNAL MEDICINE

## 2023-10-06 PROCEDURE — 99204 PR OFFICE/OUTPT VISIT, NEW, LEVL IV, 45-59 MIN: ICD-10-PCS | Mod: S$GLB,,, | Performed by: INTERNAL MEDICINE

## 2023-10-06 PROCEDURE — 3066F PR DOCUMENTATION OF TREATMENT FOR NEPHROPATHY: ICD-10-PCS | Mod: CPTII,S$GLB,, | Performed by: INTERNAL MEDICINE

## 2023-10-06 PROCEDURE — 3044F HG A1C LEVEL LT 7.0%: CPT | Mod: CPTII,S$GLB,, | Performed by: INTERNAL MEDICINE

## 2023-10-06 PROCEDURE — 3008F BODY MASS INDEX DOCD: CPT | Mod: CPTII,S$GLB,, | Performed by: INTERNAL MEDICINE

## 2023-10-06 PROCEDURE — 1160F RVW MEDS BY RX/DR IN RCRD: CPT | Mod: CPTII,S$GLB,, | Performed by: INTERNAL MEDICINE

## 2023-10-06 RX ORDER — METHYLPREDNISOLONE 4 MG/1
TABLET ORAL
COMMUNITY
Start: 2023-10-04 | End: 2023-10-30

## 2023-10-06 RX ORDER — PROGESTERONE 100 MG/1
100 CAPSULE ORAL
COMMUNITY
Start: 2023-08-16

## 2023-10-06 RX ORDER — OLMESARTAN MEDOXOMIL 20 MG/1
20 TABLET ORAL 2 TIMES DAILY
COMMUNITY
Start: 2023-07-27

## 2023-10-06 RX ORDER — ONDANSETRON 4 MG/1
TABLET, ORALLY DISINTEGRATING ORAL
COMMUNITY

## 2023-10-06 RX ORDER — NITROGLYCERIN 0.4 MG/1
TABLET SUBLINGUAL
COMMUNITY

## 2023-10-06 RX ORDER — AMOXICILLIN 500 MG/1
500 CAPSULE ORAL 3 TIMES DAILY
COMMUNITY
Start: 2023-10-04 | End: 2023-10-30

## 2023-10-06 RX ORDER — ESTRADIOL 0.5 MG/1
0.5 TABLET ORAL
COMMUNITY
Start: 2023-09-14

## 2023-10-06 RX ORDER — TRIAZOLAM 0.25 MG/1
0.25 TABLET ORAL NIGHTLY PRN
COMMUNITY
Start: 2023-10-04 | End: 2023-10-30

## 2023-10-06 RX ORDER — EZETIMIBE 10 MG/1
10 TABLET ORAL
COMMUNITY
Start: 2023-09-28

## 2023-10-06 RX ORDER — PRAVASTATIN SODIUM 10 MG/1
1 TABLET ORAL DAILY
COMMUNITY
End: 2023-10-30

## 2023-10-06 RX ORDER — VILAZODONE HYDROCHLORIDE 40 MG/1
1 TABLET ORAL DAILY
COMMUNITY
End: 2023-10-13

## 2023-10-06 NOTE — PROGRESS NOTES
Subjective:       Patient ID: Chloe Obrien is a 62 y.o. White female who presents for new patient evaluation for renal stone.    Chloe Obrien is referred by Jasmyn Cox MD to be evaluated for renal stone.  She has no uremic or urinary symptoms and is in her usual state of health.  There have been no recent illnesses, hospitalizations or procedures.  She states she is taking two forms of vitamin D currently.  She is getting ready to have dental work performed soon.        Review of Systems   Constitutional:  Negative for fever.   HENT:  Positive for congestion.    Respiratory:  Positive for shortness of breath (occasional). Negative for cough.    Cardiovascular:  Positive for leg swelling (L ankle at times). Negative for chest pain.   Gastrointestinal:  Negative for nausea and vomiting.   Genitourinary:  Negative for difficulty urinating.   Musculoskeletal:  Positive for gait problem (LLE claudication).   Neurological:  Positive for headaches (allergy).   Psychiatric/Behavioral:  Negative for confusion.          The past medical, family and social histories were reviewed for this encounter.     Past Medical History:   Diagnosis Date    1 PPD X 20+ YRs Tobacco Use Disorder     11/14/18 I Advocated Smoking Cessation And RXd     Anticoagulant long-term use     Borderline Vitamin D Deficiency     10/21/18 And 4/4/16 RXd OTC D3 2K Daily    Chronic Insomnia `    On Restoril 30 Mg qHS PRN, Trazodone 100 Mg qHS Caused Dizziness And Headaches    Chronic Recurrent Left Foreleg Pain     Depression     12/21/16 Decreased Wellbutrin Further And Increased Zoloft To 100 Mg Daily; 11/8/16 Decreased Wellbutrin-XL To 300 Mg qAM And RXd Zoloft 50 Mg qAM; She Sees A Therapist For This; Lexapro Was Innefective    Diabetes mellitus     prediabetic    Diverticulosis Of Sigmoid, Decending, Transverse, and Ascending Colon; W/O Diverticulitis On 9/24/18 EGD     Dr. KIRAN Heck    Gastroparesis     Dr. KIRAN Borja  "[place] On 5/2/17 Continued Her Post Gastric Sleeve Diet    GERD With H/O Stricture Dilation     Dr. KIRAN Heck    H/O Abnormal Mammogram     F/U Mammograms And U/S Have Been Stable    H/O Abnormal Mammogram     F/U Mammograms And U/S Have Been Stable     H/O Adenomatous Colon Polyps     Dr. KIRAN Heck TC On 9/24/18 Rectal Polyps Only; "Repeat TC In 5 YRs"    H/O H. Pylori Gastritis 2009     Dr. KIRAN Heck; Received Triple Tx Then    H/O Nephrolithiasis     6/3/16 ABD/Pelvic CT W/W/O = 1 CM Right Renal Stone Only; 5/26/15 ABD/Pelvic CT With IV Contrast Only = Positive For A Nonobstructing Right Renal Stone    H/O of CABG 11/15/21     CABG X3 With [place]-Vein-Endovascular (Left)     H/O Recurrent Cyclical Nausea     Dr. KIRAN Heck    H/O Small Rectal Polyps On 9/24/18 TC; Nonbleeding, Biopsies Negative For Malignancy     Dr. Jonh Heck; "Repeat TC IN 5 YRs"    Hypercholesterolemia     On Livalo 4 Mg qHS    Hypertension     no longer taking medication since weight loss    Hypothyroidism     On [place] Thyroid    l Right Axillary Muscle Strain 08/31/2022    Medium Internal Hemorrhoids With Rectal Bleeding On 9/24/18 EGD     Dr. KIRAN Heck; Will Schedule Clinic Visit For Hemorrhoid Banding    Mitral valve prolapse     Nonalcoholic Steatohepatitis ([facility].A.S.H.)     Dr. KIRAN Heck On 8/18/16 Ordered A Liver U/S And LFTs    Nonobstructive atherosclerosis of coronary artery     Obesity     Obesity S/P Sleeve Gastrectomy With Hiatal hernia Repair 10/2015     Dr. Scot Werner    Ocular Migraines     Osteoporosis     10/21/18 RXd Fosamax 70 Mg Weekly, And OTC D3 2K IU Daily, And OTC CaCO3 600 Mg Daily    Partial tear of subscapularis tendon, left, subsequent encounter     08/2020    Pulmonary Nodules     Dr. Luis F Evans Will Repeat This In 1 Year; 8/7/18 Low Dose Chest CT = 1 Small Nodule In Each Lung    Rectal polyp     Renal disorder     nephrolithiasis    RLQ Pain " Due To Epiploic Appendagitis 08/2016     Dr. KIRAN Heck TXd This With Augmentin And Naproxen After Cipro And Flagyl Failed    Rotator cuff syndrome of left shoulder 08/2020    Rotator cuff syndrome, left 08/10/2020    Rotator cuff syndrome, right 05/10/2021    RUQ Abdominal Pain 5/31/16     6/3/16 ABD/Pelvic CT W/W/O = 1 CM Right Renal Stone Only; 5/31/16 ABD XRays = Unremarkable; 5/31/16 Increased Her PPI To Bid X 1 Week     Past Surgical History:   Procedure Laterality Date    ABDOMINAL SURGERY      ANGIOGRAM, CORONARY, WITH LEFT HEART CATHETERIZATION N/A 11/22/2022    Procedure: ANGIOGRAM,CORONARY,WITH LEFT HEART CATHETERIZATION;  Surgeon: Zack Romero MD;  Location: [facility] CATH;  Service: Cardiology;  Laterality: N/A;    ANGIOGRAPHY OF LOWER EXTREMITY N/A 11/22/2022    Procedure: ANGIOGRAM, LOWER EXTREMITY;  Surgeon: Zack Romero MD;  Location: [facility] CATH;  Service: Cardiology;  Laterality: N/A;    ARTHROSCOPIC REPAIR OF ROTATOR CUFF OF SHOULDER Left 9/2/2020    Procedure: REPAIR, ROTATOR CUFF, ARTHROSCOPIC;  Surgeon: Clifford Kendrick II, MD;  [facility]: [facility] OR;  Service: Orthopedics;  Laterality: Left;    ARTHROSCOPY OF SHOULDER WITH DECOMPRESSION OF SUBACROMIAL SPACE Left 9/2/2020    Procedure: ARTHROSCOPY, SHOULDER, WITH SUBACROMIAL SPACE DECOMPRESSION;  Surgeon: Clifford Kendrick II, MD;  Location: [facility] OR;  Service: Orthopedics;  Laterality: Left;    BUNIONECTOMY      CARPAL TUNNEL RELEASE      CHOLECYSTECTOMY      COLONOSCOPY      september 2015    CORONARY ARTERY BYPASS GRAFT (CABG) N/A 11/15/2021    Procedure: CORONARY ARTERY BYPASS GRAFT (CABG) x 3;  Surgeon: Fabian Monatna MD;  [facility];  Service: Cardiothoracic;  Laterality: N/A;    ENDOSCOPIC HARVEST OF VEIN Left 11/15/2021    Procedure: HARVEST-VEIN-ENDOVASCULAR;  Surgeon: Fabian Montana MD;  Location: [facility] OR 2ND FLR;  Service: Cardiothoracic;  Laterality: Left;  Vein harvest start: 1430  Vein harvest  stop: 1504  Vein prep start: 1504  Vein prep stop: 1527    ESOPHAGOGASTRODUODENOSCOPY      EXCISIONAL HEMORRHOIDECTOMY N/A 5/8/2019    Procedure: HEMORRHOIDECTOMY;  Surgeon: Jelani Leal MD;  Location: [facility] OR;  Service: General;  Laterality: N/A;    FIXATION OF TENDON Left 9/2/2020    Procedure: FIXATION, TENDON - open biceps tenodesis;  Surgeon: Clifford Kendrick II, MD;  Location: [facility] OR;  Service: Orthopedics;  Laterality: Left;    FLEXIBLE SIGMOIDOSCOPY Left 11/26/2018    Procedure: SIGMOIDOSCOPY, FLEXIBLE;  Surgeon: Anatoliy Simms MD;  Location: [facility] ENDO;  Service: Endoscopy;  Laterality: Left;    GASTRECTOMY      gastric sleeve    gastric sleeve  Oct 2015     HERNIA REPAIR      HYSTERECTOMY      total @ age 34    LEFT HEART CATHETERIZATION Left 11/9/2021    Procedure: Left heart cath;  Surgeon: Samantha Vergara MD;  Location: [facility] CATH;  Service: Cardiology;  Laterality: Left;    OOPHORECTOMY      w/Hysterectomy @ age 34    TUBAL LIGATION       Social History     Socioeconomic History    Marital status:      Spouse name: Vlad    Number of children: 3   Occupational History     Employer: [employer]   Tobacco Use    Smoking status: Former     Current packs/day: 0.00     Average packs/day: 0.8 packs/day for 43.0 years (34.4 ttl pk-yrs)     Types: Cigarettes     Start date: 8/26/1977     Quit date: 8/26/2020     Years since quitting: 3.1    Smokeless tobacco: Never    Tobacco comments:     quit 11/2018 restarted recently and quit 3 days ago   Substance and Sexual Activity    Alcohol use: No     Alcohol/week: 0.0 standard drinks of alcohol    Drug use: Never    Sexual activity: Yes     Birth control/protection: None, Post-menopausal, See Surgical Hx     Social Determinants of Health     Financial Resource Strain: Medium Risk (11/5/2019)    Overall Financial Resource Strain (CARDIA)     Difficulty of Paying Living Expenses: Somewhat hard   Food Insecurity: Food Insecurity Present  (11/5/2019)    Hunger Vital Sign     Worried About Running Out of Food in the Last Year: Sometimes true     Ran Out of Food in the Last Year: Patient refused   Transportation Needs: Unknown (11/5/2019)    PRAPARE - Transportation     Lack of Transportation (Medical): Patient refused     Lack of Transportation (Non-Medical): No   Physical Activity: Sufficiently Active (11/5/2019)    Exercise Vital Sign     Days of Exercise per Week: 4 days     Minutes of Exercise per Session: 50 min   Stress: No Stress Concern Present (11/27/2020)    [employer] [place] [employer] - Occupational Stress Questionnaire     Feeling of Stress : Only a little   Social Connections: Unknown (11/5/2019)    Social Connection and Isolation Panel [NHANES]     Frequency of Communication with Friends and Family: Once a week     Frequency of Social Gatherings with Friends and Family: Once a week     Active Member of Clubs or Organizations: No     Marital Status:      Current Outpatient Medications   Medication Sig    acetaminophen (TYLENOL) 500 MG tablet Take 2 tablets (1,000 mg total) by mouth every 6 (six) hours as needed for Pain.    albuterol (PROAIR HFA) 90 mcg/actuation inhaler Inhale 2 puffs into the lungs every 6 (six) hours as needed for Wheezing or Shortness of Breath. Rescue    amLODIPine (NORVASC) 5 MG tablet Take 5 mg by mouth once daily.    amoxicillin (AMOXIL) 500 MG capsule Take 500 mg by mouth 3 (three) times daily.    aspirin (ECOTRIN) 81 MG EC tablet Take 81 mg by mouth once daily.    atorvastatin (LIPITOR) 40 MG tablet TAKE 1 TABLET BY MOUTH EVERY DAY    azelastine (ASTELIN) 137 mcg (0.1 %) nasal spray 2 sprays (274 mcg total) by Nasal route as needed.    buPROPion (WELLBUTRIN) 75 MG tablet TAKE 1 TABLET BY MOUTH EVERY MORNING.    cetirizine (ZYRTEC) 10 MG tablet Take 10 mg by mouth once daily.    cyanocobalamin 1,000 mcg/mL injection Inject 1,000 mcg into the muscle every 30 days.    ergocalciferol  (ERGOCALCIFEROL) 50,000 unit Cap TAKE 1 CAPSULE BY MOUTH ONE TIME PER WEEK    estradioL (ESTRACE) 0.5 MG tablet Take 0.5 mg by mouth.    ezetimibe (ZETIA) 10 mg tablet Take 10 mg by mouth.    famotidine (PEPCID) 40 MG tablet Take 40 mg by mouth once daily.    fluticasone-umeclidin-vilanter (TRELEGY ELLIPTA) 100-62.5-25 mcg DsDv Inhale 1 puff into the lungs once daily.    hydrOXYzine pamoate (VISTARIL) 25 MG Cap TAKE 1 CAPSULE BY MOUTH THREE TIMES A DAY    Lactobacillus rhamnosus GG (CULTURELLE) 10 billion cell capsule Take 1 capsule by mouth 2 (two) times a day.    lansoprazole (PREVACID) 30 MG capsule Take 30 mg by mouth once daily.    levothyroxine (SYNTHROID) 125 MCG tablet Take 125 mcg by mouth before breakfast. Pt takes once weekly    losartan (COZAAR) 50 MG tablet Take 50 mg by mouth once daily.    methylPREDNISolone (MEDROL DOSEPACK) 4 mg tablet Take by mouth.    montelukast (SINGULAIR) 10 mg tablet TAKE 1 TABLET BY MOUTH AS NEEDED.    multivit-min/ferrous fumarate (MULTI VITAMIN ORAL) Take by mouth.    nitroGLYCERIN (NITROSTAT) 0.4 MG SL tablet PLACE 1 TABLET AS NEEDED UNDER TONGUE ROUTE AS NEEDED.    olmesartan (BENICAR) 20 MG tablet Take 20 mg by mouth 2 (two) times daily.    ondansetron (ZOFRAN-ODT) 4 MG TbDL DISSOLVE 1 TABLET BY MOUTH EVERY 6 HOURS AS NEEDED FOR NAUSEA AND VOMITING    pravastatin (PRAVACHOL) 10 MG tablet Take 1 tablet by mouth once daily.    progesterone (PROMETRIUM) 100 MG capsule Take 100 mg by mouth.    propranoloL (INDERAL) 20 MG tablet TAKE 1 TABLET BY MOUTH TWICE A DAY    rivaroxaban (XARELTO) 2.5 mg Tab Take 2.5 mg by mouth 2 (two) times daily.    traZODone (DESYREL) 50 MG tablet Take 1-2 tablets ( mg total) by mouth every evening.    triamterene-hydrochlorothiazide 37.5-25 mg (DYAZIDE) 37.5-25 mg per capsule Take 1 capsule by mouth once daily.    triazolam (HALCION) 0.25 MG Tab Take 0.25 mg by mouth nightly as needed.    ubidecarenone (CO Q-10 ORAL) Take by mouth.    UNABLE  "TO FIND UNITHYROID 125 mcg-pt takes 6 times weekly    vilazodone (VIIBRYD) 40 mg Tab tablet Take 1 tablet (40 mg total) by mouth once daily.    vilazodone (VIIBRYD) 40 mg Tab tablet Take 1 tablet by mouth once daily.    vitamin D (VITAMIN D3) 1000 units Tab Take 1,000 Units by mouth once daily.    OZEMPIC 1 mg/dose (4 mg/3 mL) Inject 1 mg into the skin once a week.     No current facility-administered medications for this visit.     /60 (BP Location: Left arm, Patient Position: Sitting, BP Method: Large (Manual))   Ht 5' 3" (1.6 m)   BMI 31.00 kg/m²     Objective:      Physical Exam  Vitals reviewed.   Constitutional:       General: She is not in acute distress.     Appearance: She is well-developed.   HENT:      Head: Normocephalic and atraumatic.   Eyes:      General: No scleral icterus.     Conjunctiva/sclera: Conjunctivae normal.   Neck:      Vascular: No JVD.   Cardiovascular:      Rate and Rhythm: Normal rate and regular rhythm.      Heart sounds: Normal heart sounds. No murmur heard.     No friction rub. No gallop.   Pulmonary:      Effort: Pulmonary effort is normal. No respiratory distress.      Breath sounds: Normal breath sounds. No wheezing or rales.   Abdominal:      General: Bowel sounds are normal. There is no distension.      Palpations: Abdomen is soft.      Tenderness: There is no abdominal tenderness.   Musculoskeletal:      Cervical back: Normal range of motion.      Right lower leg: No edema.      Left lower leg: No edema.   Skin:     General: Skin is warm and dry.      Findings: No rash.   Neurological:      Mental Status: She is alert and oriented to person, place, and time.   Psychiatric:         Mood and Affect: Mood normal.         Behavior: Behavior normal.         Assessment:       1. Kidney cyst, acquired        Lab Results   Component Value Date    CREATININE 0.65 10/04/2023    BUN 9 10/04/2023     10/04/2023    K 4.1 10/04/2023     10/04/2023    CO2 28 10/04/2023 " "    Lab Results   Component Value Date    CALCIUM 8.7 10/04/2023    CAION 1.17 11/16/2021    PHOS 3.4 11/21/2021     Lab Results   Component Value Date    HCT 36.5 (L) 10/04/2023     No results found for: "UTPCR"  Plan:   Return to clinic in 6 months.  Labs for next visit include rp pth ohd.  Renal; US this month.  Baseline creatinine is 0.6-0.8.  On CT she appears to just hav some cortical calcification on the right with no definitive stone seen.  I will obtain an US to further evaluate.  She is on 2 forms of viatmin D which I have asked her to address with Jasmyn Cox MD.  I will check an OHD level next time I see her.    "

## 2023-10-13 RX ORDER — VILAZODONE HYDROCHLORIDE 40 MG/1
40 TABLET ORAL
Qty: 90 TABLET | Refills: 1 | Status: SHIPPED | OUTPATIENT
Start: 2023-10-13 | End: 2024-01-08 | Stop reason: SDUPTHER

## 2023-10-13 NOTE — TELEPHONE ENCOUNTER
NOT SEEING MEDICATION ON MEDICATION LIST: Last office visit 09/05/2023         PLAN:  Increase vilazodone from 20-40 mg daily for mood, anxiety, and marked increase in irritability  Continue bupropion 75 mg q.a.m. for mood  Continue hydroxyzine 50 mg q.h.s. p.r.n. anxiety/insomnia  Continue trazodone 100 mg q.h.s. p.r.n. insomnia (patient does not take trazodone if she takes hydroxyzine)  Continue L-methylfolate 15 milligrams daily due to decreased MTHFR activity  Pt has discontinued regular individual psychotherapy with ERVIN Price LCSW, but will schedule p.r.n. if needed

## 2023-10-30 ENCOUNTER — OFFICE VISIT (OUTPATIENT)
Dept: PSYCHIATRY | Facility: CLINIC | Age: 63
End: 2023-10-30
Payer: COMMERCIAL

## 2023-10-30 ENCOUNTER — PATIENT MESSAGE (OUTPATIENT)
Dept: PSYCHIATRY | Facility: CLINIC | Age: 63
End: 2023-10-30
Payer: COMMERCIAL

## 2023-10-30 DIAGNOSIS — F33.0 MDD (MAJOR DEPRESSIVE DISORDER), RECURRENT EPISODE, MILD: Primary | ICD-10-CM

## 2023-10-30 DIAGNOSIS — F50.81 BINGE EATING DISORDER: ICD-10-CM

## 2023-10-30 DIAGNOSIS — G47.00 INSOMNIA, UNSPECIFIED TYPE: ICD-10-CM

## 2023-10-30 DIAGNOSIS — F41.1 GENERALIZED ANXIETY DISORDER: ICD-10-CM

## 2023-10-30 PROCEDURE — 3066F NEPHROPATHY DOC TX: CPT | Mod: CPTII,95,,

## 2023-10-30 PROCEDURE — 3044F PR MOST RECENT HEMOGLOBIN A1C LEVEL <7.0%: ICD-10-PCS | Mod: CPTII,95,,

## 2023-10-30 PROCEDURE — 99215 OFFICE O/P EST HI 40 MIN: CPT | Mod: 95,,,

## 2023-10-30 PROCEDURE — 3066F PR DOCUMENTATION OF TREATMENT FOR NEPHROPATHY: ICD-10-PCS | Mod: CPTII,95,,

## 2023-10-30 PROCEDURE — 4010F PR ACE/ARB THEARPY RXD/TAKEN: ICD-10-PCS | Mod: CPTII,95,,

## 2023-10-30 PROCEDURE — 3044F HG A1C LEVEL LT 7.0%: CPT | Mod: CPTII,95,,

## 2023-10-30 PROCEDURE — 1160F RVW MEDS BY RX/DR IN RCRD: CPT | Mod: CPTII,95,,

## 2023-10-30 PROCEDURE — 4010F ACE/ARB THERAPY RXD/TAKEN: CPT | Mod: CPTII,95,,

## 2023-10-30 PROCEDURE — 1159F MED LIST DOCD IN RCRD: CPT | Mod: CPTII,95,,

## 2023-10-30 PROCEDURE — 99215 PR OFFICE/OUTPT VISIT, EST, LEVL V, 40-54 MIN: ICD-10-PCS | Mod: 95,,,

## 2023-10-30 PROCEDURE — 1160F PR REVIEW ALL MEDS BY PRESCRIBER/CLIN PHARMACIST DOCUMENTED: ICD-10-PCS | Mod: CPTII,95,,

## 2023-10-30 PROCEDURE — 1159F PR MEDICATION LIST DOCUMENTED IN MEDICAL RECORD: ICD-10-PCS | Mod: CPTII,95,,

## 2023-10-30 RX ORDER — TRAZODONE HYDROCHLORIDE 150 MG/1
150 TABLET ORAL NIGHTLY
Qty: 90 TABLET | Refills: 0 | Status: SHIPPED | OUTPATIENT
Start: 2023-10-30 | End: 2023-12-06

## 2023-10-30 RX ORDER — BUSPIRONE HYDROCHLORIDE 10 MG/1
TABLET ORAL
Qty: 69 TABLET | Refills: 0 | Status: SHIPPED | OUTPATIENT
Start: 2023-10-30 | End: 2023-11-27

## 2023-10-30 NOTE — PROGRESS NOTES
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 face time preparing to see the patient (eg. review of tests), obtaining and/or reviewing separately obtained history, documenting clinical information in the electronic health record, independently interpreting results (not separately reported), and communicating results to the patient/family/caregiver, or care coordination (not separately reported).     At this time there are no indications the patient represents an imminent danger to either themselves or others; will continue to manage treatment in the outpatient setting.    I discussed the patient's care with the patient including benefits, alternatives, possible adverse effects of the treatment plan; including the potential for metabolic complications, major organ dysfunction, black box warnings, and contraindications. The opportunity was given for questions/clarification, and after this discussion the above treatment plan was devised through shared decision making. The patient voiced their understanding of the diagnoses and treatments listed above and agreed to the treatment plan. Follow up plan was reviewed with the patient. The patient was advised  "to call to report any worsening of symptoms or problems with medication.    Supportive therapy and psychoeducation provided. Patient has been given crisis information including Suicide and Crisis Lifeline (call or text: [phone]). Patient also given instructions to go to the nearest ER or call 911 if unable to remain safe or if the Pt develops thoughts of harming self or others.    Documentation entered by me for this encounter may have been done in part using [employer] voice recognition transcription software. Garbled syntax, mangled pronouns, and other bizarre constructions may be attributed to that software system. Although I have made an effort to ensure accuracy, "sound like" errors may exist and should be interpreted in context.    "

## 2023-11-24 ENCOUNTER — PATIENT MESSAGE (OUTPATIENT)
Dept: PSYCHIATRY | Facility: CLINIC | Age: 63
End: 2023-11-24
Payer: COMMERCIAL

## 2023-11-27 RX ORDER — HYDROXYZINE PAMOATE 25 MG/1
CAPSULE ORAL
Qty: 270 CAPSULE | Refills: 0 | Status: SHIPPED | OUTPATIENT
Start: 2023-11-27

## 2023-11-27 RX ORDER — BUSPIRONE HYDROCHLORIDE 15 MG/1
15 TABLET ORAL 2 TIMES DAILY
Qty: 180 TABLET | Refills: 0 | Status: SHIPPED | OUTPATIENT
Start: 2023-11-27 | End: 2024-01-08

## 2023-12-04 ENCOUNTER — OFFICE VISIT (OUTPATIENT)
Dept: PSYCHIATRY | Facility: CLINIC | Age: 63
End: 2023-12-04
Payer: COMMERCIAL

## 2023-12-04 ENCOUNTER — PATIENT MESSAGE (OUTPATIENT)
Dept: PSYCHIATRY | Facility: CLINIC | Age: 63
End: 2023-12-04
Payer: COMMERCIAL

## 2023-12-04 DIAGNOSIS — F41.1 GENERALIZED ANXIETY DISORDER: ICD-10-CM

## 2023-12-04 DIAGNOSIS — G47.00 INSOMNIA, UNSPECIFIED TYPE: ICD-10-CM

## 2023-12-04 DIAGNOSIS — F33.0 MDD (MAJOR DEPRESSIVE DISORDER), RECURRENT EPISODE, MILD: Primary | ICD-10-CM

## 2023-12-04 DIAGNOSIS — F50.81 BINGE EATING DISORDER: ICD-10-CM

## 2023-12-04 PROCEDURE — 99215 OFFICE O/P EST HI 40 MIN: CPT | Mod: 95,,,

## 2023-12-04 PROCEDURE — 4010F ACE/ARB THERAPY RXD/TAKEN: CPT | Mod: CPTII,95,,

## 2023-12-04 PROCEDURE — 3066F NEPHROPATHY DOC TX: CPT | Mod: CPTII,95,,

## 2023-12-04 PROCEDURE — 3044F HG A1C LEVEL LT 7.0%: CPT | Mod: CPTII,95,,

## 2023-12-04 PROCEDURE — 3066F PR DOCUMENTATION OF TREATMENT FOR NEPHROPATHY: ICD-10-PCS | Mod: CPTII,95,,

## 2023-12-04 PROCEDURE — 1160F RVW MEDS BY RX/DR IN RCRD: CPT | Mod: CPTII,95,,

## 2023-12-04 PROCEDURE — 4010F PR ACE/ARB THEARPY RXD/TAKEN: ICD-10-PCS | Mod: CPTII,95,,

## 2023-12-04 PROCEDURE — 1159F MED LIST DOCD IN RCRD: CPT | Mod: CPTII,95,,

## 2023-12-04 PROCEDURE — 3044F PR MOST RECENT HEMOGLOBIN A1C LEVEL <7.0%: ICD-10-PCS | Mod: CPTII,95,,

## 2023-12-04 PROCEDURE — 1160F PR REVIEW ALL MEDS BY PRESCRIBER/CLIN PHARMACIST DOCUMENTED: ICD-10-PCS | Mod: CPTII,95,,

## 2023-12-04 PROCEDURE — 1159F PR MEDICATION LIST DOCUMENTED IN MEDICAL RECORD: ICD-10-PCS | Mod: CPTII,95,,

## 2023-12-04 PROCEDURE — 99215 PR OFFICE/OUTPT VISIT, EST, LEVL V, 40-54 MIN: ICD-10-PCS | Mod: 95,,,

## 2023-12-04 RX ORDER — BUPROPION HYDROCHLORIDE 75 MG/1
75 TABLET ORAL EVERY MORNING
Qty: 90 TABLET | Refills: 1 | Status: SHIPPED | OUTPATIENT
Start: 2023-12-04

## 2023-12-04 NOTE — PROGRESS NOTES
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  Patient reports no suicidal ideation  Patient reports no homicidal ideation  Patient reports no self-injurious behavior  Patient reports no violent behavior    DIAGNOSES:    ICD-10-CM ICD-9-CM   1. MDD (major depressive disorder), recurrent episode, mild  F33.0 296.31   2. Generalized anxiety disorder  F41.1 300.02   3. Insomnia, unspecified type  G47.00 780.52   4. Binge eating disorder  F50.81 307.50       PLAN:  Continue vilazodone 40 mg daily for mood, anxiety, and irritability  Continue bupropion 75 mg q.a.m. for mood  Continue buspirone 15 mg b.i.d. for generalized anxiety  Continue hydroxyzine 50 mg q.h.s. p.r.n. anxiety/insomnia (Pt agrees to stop taking b.i.d. and to only take p.r.n. for severe anxiety / panic)  Continue trazodone 150 mg q.h.s. p.r.n. insomnia (residual insomnia at 100 mg dose)  Continue L-methylfolate 15 milligrams daily due to decreased MTHFR activity  Pt has discontinued regular individual psychotherapy with ERVIN Price LCSW, but will schedule p.r.n. if needed      RETURN TO CLINIC:   1 month      DANY Garcia, PMHNP-BC      46 minutes of total time spent on the encounter, which includes  "face to face time and non-face to face time preparing to see the patient (eg. review of tests), obtaining and/or reviewing separately obtained history, documenting clinical information in the electronic health record, independently interpreting results (not separately reported), and communicating results to the patient/family/caregiver, or care coordination (not separately reported).     At this time there are no indications the patient represents an imminent danger to either themselves or others; will continue to manage treatment in the outpatient setting.    I discussed the patient's care with the patient including benefits, alternatives, possible adverse effects of the treatment plan; including the potential for metabolic complications, major organ dysfunction, black box warnings, and contraindications. The opportunity was given for questions/clarification, and after this discussion the above treatment plan was devised through shared decision making. The patient voiced their understanding of the diagnoses and treatments listed above and agreed to the treatment plan. Follow up plan was reviewed with the patient. The patient was advised to call to report any worsening of symptoms or problems with medication.    Supportive therapy and psychoeducation provided. Patient has been given crisis information including Suicide and Crisis Lifeline (call or text: [phone]). Patient also given instructions to go to the nearest ER or call 911 if unable to remain safe or if the Pt develops thoughts of harming self or others.    Documentation entered by me for this encounter may have been done in part using [employer] voice recognition transcription software. Garbled syntax, mangled pronouns, and other bizarre constructions may be attributed to that software system. Although I have made an effort to ensure accuracy, "sound like" errors may exist and should be interpreted in context.    "

## 2023-12-06 RX ORDER — TRAZODONE HYDROCHLORIDE 150 MG/1
150 TABLET ORAL NIGHTLY
Qty: 90 TABLET | Refills: 0 | Status: SHIPPED | OUTPATIENT
Start: 2023-12-06 | End: 2024-02-05 | Stop reason: SDUPTHER

## 2023-12-06 NOTE — TELEPHONE ENCOUNTER
Last ordered: 1 month ago (10/30/2023) by Susu Desai NP     Last refill: 10/30/2023     Nov 1/8/24  Lov 12/5/23

## 2024-01-04 ENCOUNTER — TELEPHONE (OUTPATIENT)
Dept: PSYCHIATRY | Facility: CLINIC | Age: 64
End: 2024-01-04
Payer: COMMERCIAL

## 2024-01-08 ENCOUNTER — OFFICE VISIT (OUTPATIENT)
Dept: PSYCHIATRY | Facility: CLINIC | Age: 64
End: 2024-01-08
Payer: COMMERCIAL

## 2024-01-08 ENCOUNTER — TELEPHONE (OUTPATIENT)
Dept: PSYCHIATRY | Facility: CLINIC | Age: 64
End: 2024-01-08
Payer: COMMERCIAL

## 2024-01-08 DIAGNOSIS — F41.1 GENERALIZED ANXIETY DISORDER: ICD-10-CM

## 2024-01-08 DIAGNOSIS — G47.00 INSOMNIA, UNSPECIFIED TYPE: ICD-10-CM

## 2024-01-08 DIAGNOSIS — F33.0 MDD (MAJOR DEPRESSIVE DISORDER), RECURRENT EPISODE, MILD: Primary | ICD-10-CM

## 2024-01-08 DIAGNOSIS — F50.81 BINGE EATING DISORDER: ICD-10-CM

## 2024-01-08 PROCEDURE — 1160F RVW MEDS BY RX/DR IN RCRD: CPT | Mod: CPTII,95,,

## 2024-01-08 PROCEDURE — 99215 OFFICE O/P EST HI 40 MIN: CPT | Mod: 95,,,

## 2024-01-08 PROCEDURE — 1159F MED LIST DOCD IN RCRD: CPT | Mod: CPTII,95,,

## 2024-01-08 RX ORDER — VILAZODONE HYDROCHLORIDE 40 MG/1
40 TABLET ORAL DAILY
Qty: 90 TABLET | Refills: 1 | Status: SHIPPED | OUTPATIENT
Start: 2024-01-08

## 2024-01-08 RX ORDER — BUSPIRONE HYDROCHLORIDE 15 MG/1
15 TABLET ORAL 3 TIMES DAILY
Qty: 270 TABLET | Refills: 1 | Status: SHIPPED | OUTPATIENT
Start: 2024-01-08 | End: 2025-01-07

## 2024-01-08 NOTE — PROGRESS NOTES
"OUTPATIENT PSYCHIATRY FOLLOW UP VISIT    Encounter Date: 1/8/2024  Clinical Status of Patient:  Outpatient (Virtual)  The patient location is: [facility] [place] [address]  The patient phone number is: [phone]   Visit type: Virtual visit with synchronous audio and video  Each patient to whom he or she provides medical services by telemedicine is:  (1) informed of the relationship between the practitioner and patient and the respective role of any other health care provider with respect to management of the patient; and (2) notified that he or she may decline to receive medical services by telemedicine and may withdraw from such care at any time.    Chief Complaint:  Chloe Obrien is a 63 y.o. female who presents today for follow-up.  Met with patient.      HISTORY OF PRESENTING ILLNESS:  Chloe Obrien is a 63 y.o. female with history of MDD, KIANA, BED, insomnia, s/p sleeve gastrectomy, CABGx3 who presents for follow up appointment.      6/13/2023: Pharmacogenomic test results reviewed with Pt. Pt has NO major gene-drug interactions found and only 4 medications listed under moderate gene-drug interactions (clozapine, olanzapine, asenapine, and nicotine). Decreased MTHFR activity.     12/4/2023: Mood continues to be "stressed. Tried by my adult children. Instead of yelling at them I just look at them and cry. They're just mean and ugly and they can't get along. It's just been a trying couple of weeks." Daughter came in for Thanksgiving. Other daughter was also there and they don't get along. "And then they put me in the middle by saying they don't want to be around each other. And then I cry and then I get over it. The holidays are always the worst."   Setting good boundaries. Instead of doing things for her children, "I'm working on changing myself to not fall down their rabbit holes."  "I'm feeling emotions but it's a different emotion, instead of being angry I cry over it." Decreased anger and " "irritability.    Denies crying spells when her children are not around.   Continues L-methylfolate    Plan at last appointment:   Continue vilazodone 40 mg daily for mood, anxiety, and irritability  Continue bupropion 75 mg q.a.m. for mood  Continue buspirone 15 mg b.i.d. for generalized anxiety  Continue hydroxyzine 50 mg q.h.s. p.r.n. anxiety/insomnia (Pt agrees to stop taking b.i.d. and to only take p.r.n. for severe anxiety / panic)  Continue trazodone 150 mg q.h.s. p.r.n. insomnia (residual insomnia at 100 mg dose)  Continue L-methylfolate 15 milligrams daily due to decreased MTHFR activity  Pt has discontinued regular individual psychotherapy with ERVIN Price LCSW, but will schedule p.r.n. if needed    Psychotropic medication history:   Ramelteon 8 mg q.h.s., xanax 0.5 mg TID, Adderall IR 15 mg BID,   Topamax ("tingling, wicked medicine")  Zoloft, celexa, cymbalta, pristiq (before heart surgery, took off of everything), Lexapro (ineffective), Effexor (ineffective),    Wellbutrin   Unsure of previous trial of rexulti  Denies previous trials of mirtazapipne, Abilify, vraylar      INTERVAL HISTORY:    Patient reports irritability worsened just prior to the Christmas holiday.  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 "It's like I get overstimulated and then I just lose it and the only way to deal with it is to yell and scream."   She also reports feeling more relaxed at other times than she was previously stating I am more able to make jokes, more relaxed."   Only rarely uses Albuterol inhaler.   History hypothyroidism, taking Unithroid, followed by endocrinologist Dr. Lechuga.  Has an appointment with Dr. Lechuga next month.  Patient does report her hair texture has been changing recently.  Discussed that bupropion could be worsening irritability.  Patient states she has taken bupropion for many years and has never " "experienced irritability before.  She does not wish to discontinue bupropion at this time, as she has seeing significant benefit in mood with this medication  Otherwise, mood has been stable.  Patient denies low mood or crying spells.  Anxiety continues to be well controlled    No adverse effects after starting buspirone.  No interval episodes with symptoms consistent with rosendo or hypomania.  Denied interval or current suicidal/homicidal thoughts, intent, or plan or NSSI.  Denied other questions and concerns.    Medication side effects: None  Medication adherence: yes    PSYCHIATRIC REVIEW OF SYSTEMS:  Is patient experiencing or having changes in:  Trouble with sleep:  sleeping well with increased dose of trazodone, getting 6-7 hours of sleep, bed at 8:00 and waking up at 2:00  Appetite changes:  Decreased after restarting Ozempic   Weight changes:  has lost 27 lbs after starting Ozempic  Lack of energy: no  Anhedonia:  no  Somatic symptoms:  no  Anxiety/panic: improving  Irritability:  increasing  Guilty/hopeless: no   Concentration:  "pretty good"  Racing thoughts: no  Impulsive behaviors: no  Paranoia/AVH: no  Self-injurious behavior/risky behavior:  no  Any drugs:  no  Alcohol:  no    MEDICAL REVIEW OF SYSTEMS:   Pain:  +chronic pain  Constitutional:   Denies fever  Cardiovascular: Denies chest pain or exertional dyspnea.  Respiratory: Robbin cough or orthopnea.   GI: Denies abdominal pain, N/V  Neurological: Denies tremor, seizure, or focal weakness.  Psychiatric: See HPI above.    PAST PSYCHIATRIC, MEDICAL, AND SOCIAL HISTORY REVIEWED  The patient's past medical, family and social history have been reviewed and updated as appropriate within the electronic medical record - see encounter notes.    PAST MEDICAL HISTORY:   Past Medical History:   Diagnosis Date    1 PPD X 20+ YRs Tobacco Use Disorder     11/14/18 I Advocated Smoking Cessation And RXd     Anticoagulant long-term use     Borderline Vitamin D " "Deficiency     10/21/18 And 4/4/16 RXd OTC D3 2K Daily    Chronic Insomnia `    On Restoril 30 Mg qHS PRN, Trazodone 100 Mg qHS Caused Dizziness And Headaches    Chronic Recurrent Left Foreleg Pain     Depression     12/21/16 Decreased Wellbutrin Further And Increased Zoloft To 100 Mg Daily; 11/8/16 Decreased Wellbutrin-XL To 300 Mg qAM And RXd Zoloft 50 Mg qAM; She Sees A Therapist For This; Lexapro Was Innefective    Diabetes mellitus     prediabetic    Diverticulosis Of Sigmoid, Decending, Transverse, and Ascending Colon; W/O Diverticulitis On 9/24/18 EGD     Dr. KIRAN Heck    Gastroparesis     Dr. KIRAN Heck On 5/2/17 Continued Her Post Gastric Sleeve Diet    GERD With H/O Stricture Dilation     Dr. KIRAN Heck    H/O Abnormal Mammogram     F/U Mammograms And U/S Have Been Stable    H/O Abnormal Mammogram     F/U Mammograms And U/S Have Been Stable     H/O Adenomatous Colon Polyps     Dr. KIRAN Heck TC On 9/24/18 Rectal Polyps Only; "Repeat TC In 5 YRs"    H/O H. Pylori Gastritis 2009     Dr. KIRAN Heck; Received Triple Tx Then    H/O Nephrolithiasis     6/3/16 ABD/Pelvic CT W/W/O = 1 CM Right Renal Stone Only; 5/26/15 ABD/Pelvic CT With IV Contrast Only = Positive For A Nonobstructing Right Renal Stone    H/O of CABG 11/15/21     CABG X3 With [place]-[facility]-End[facility] (Left)     H/O Recurrent Cyclical Nausea     Dr. KIRAN Heck    H/O Small Rectal Polyps On 9/24/18 TC; Nonbleeding, Biopsies Negative For Malignancy     Dr. Jonh Heck; "Repeat TC IN 5 YRs"    Hypercholesterolemia     On Livalo 4 Mg qHS    Hypertension     no longer taking medication since weight loss    Hypothyroidism     On [place] Thyroid    l Right Axillary Muscle Strain 08/31/2022    Medium Internal Hemorrhoids With Rectal Bleeding On 9/24/18 EGD     Dr. KIRAN Heck; Will Schedule Clinic Visit For Hemorrhoid Banding    Mitral valve prolapse     Nonalcoholic Steatohepatitis " (N.A.S.H.)     Dr. KIRAN Heck On 8/18/16 Ordered A Liver U/S And LFTs    Nonobstructive atherosclerosis of coronary artery     Obesity     Obesity S/P Sleeve Gastrectomy With Hiatal hernia Repair 10/2015     Dr. Scot Werner    Ocular Migraines     Osteoporosis     10/21/18 RXd Fosamax 70 Mg Weekly, And OTC D3 2K IU Daily, And OTC CaCO3 600 Mg Daily    Partial tear of subscapularis tendon, left, subsequent encounter     08/2020    Pulmonary Nodules     Dr. Luis F Evans Will Repeat This In 1 Year; 8/7/18 Low Dose Chest CT = 1 Small Nodule In Each Lung    Rectal polyp     Renal disorder     nephrolithiasis    RLQ Pain Due To Epiploic Appendagitis 08/2016     Dr. KIRAN Heck TXd This With Augmentin And Naproxen After Cipro And Flagyl Failed    Rotator cuff syndrome of left shoulder 08/2020    Rotator cuff syndrome, left 08/10/2020    Rotator cuff syndrome, right 05/10/2021    RUQ Abdominal Pain 5/31/16     6/3/16 ABD/Pelvic CT W/W/O = 1 CM Right Renal Stone Only; 5/31/16 ABD XRays = Unremarkable; 5/31/16 Increased Her PPI To Bid X 1 Week      Head trauma/Loss of consciousness: denies  Seizures: denies     PAST PSYCHIATRIC HISTORY:  Psychiatric Care (current & past):  [facility] 30 years ago  Previous Psychiatric Diagnoses:  Depression and anxiety  Previous Psychiatric Hospitalizations: [facility] 10/16/2022 through 12/21/2022  Previous SI/HI:    Denies  Previous Suicide Attempts or NSSI: denies  History of Psychotherapy:  CBT with LCSW, found helpful  History of Violence: denies    MEDICATIONS:    Current Outpatient Medications:     acetaminophen (TYLENOL) 500 MG tablet, Take 2 tablets (1,000 mg total) by mouth every 6 (six) hours as needed for Pain., Disp: 30 tablet, Rfl: 0    albuterol (PROAIR HFA) 90 mcg/actuation inhaler, Inhale 2 puffs into the lungs every 6 (six) hours as needed for Wheezing or Shortness of Breath. Rescue, Disp: 18 g, Rfl: 5    aspirin (ECOTRIN) 81 MG EC  tablet, Take 81 mg by mouth once daily., Disp: , Rfl:     atorvastatin (LIPITOR) 40 MG tablet, TAKE 1 TABLET BY MOUTH EVERY DAY, Disp: 90 tablet, Rfl: 1    azelastine (ASTELIN) 137 mcg (0.1 %) nasal spray, 2 sprays (274 mcg total) by Nasal route as needed., Disp: 90 mL, Rfl: 3    buPROPion (WELLBUTRIN) 75 MG tablet, Take 1 tablet (75 mg total) by mouth every morning., Disp: 90 tablet, Rfl: 1    busPIRone (BUSPAR) 15 MG tablet, Take 1 tablet (15 mg total) by mouth 3 (three) times daily., Disp: 270 tablet, Rfl: 1    cetirizine (ZYRTEC) 10 MG tablet, Take 10 mg by mouth once daily., Disp: , Rfl:     clopidogreL (PLAVIX) 75 mg tablet, Take 75 mg by mouth once daily., Disp: , Rfl:     cyanocobalamin 1,000 mcg/mL injection, Inject 1,000 mcg into the muscle every 30 days., Disp: , Rfl:     ergocalciferol (ERGOCALCIFEROL) 50,000 unit Cap, TAKE 1 CAPSULE BY MOUTH ONE TIME PER WEEK, Disp: 12 capsule, Rfl: 2    estradioL (ESTRACE) 0.5 MG tablet, Take 0.5 mg by mouth., Disp: , Rfl:     ezetimibe (ZETIA) 10 mg tablet, Take 10 mg by mouth., Disp: , Rfl:     famotidine (PEPCID) 40 MG tablet, Take 40 mg by mouth once daily., Disp: , Rfl:     fluticasone-umeclidin-vilanter (TRELEGY ELLIPTA) 100-62.5-25 mcg DsDv, Inhale 1 puff into the lungs once daily., Disp: 1 each, Rfl: 5    hydrOXYzine pamoate (VISTARIL) 25 MG Cap, TAKE 1 CAPSULE BY MOUTH THREE TIMES A DAY, Disp: 270 capsule, Rfl: 0    Lactobacillus rhamnosus GG (CULTURELLE) 10 billion cell capsule, Take 1 capsule by mouth 2 (two) times a day., Disp: , Rfl:     lansoprazole (PREVACID) 30 MG capsule, Take 30 mg by mouth once daily., Disp: , Rfl:     levothyroxine (SYNTHROID) 125 MCG tablet, Take 125 mcg by mouth before breakfast. Pt takes once weekly, Disp: , Rfl:     montelukast (SINGULAIR) 10 mg tablet, TAKE 1 TABLET BY MOUTH AS NEEDED., Disp: 90 tablet, Rfl: 3    multivit-min/ferrous fumarate (MULTI VITAMIN ORAL), Take by mouth., Disp: , Rfl:     nitroGLYCERIN (NITROSTAT) 0.4  "MG SL tablet, PLACE 1 TABLET AS NEEDED UNDER TONGUE ROUTE AS NEEDED., Disp: , Rfl:     olmesartan (BENICAR) 20 MG tablet, Take 20 mg by mouth 2 (two) times daily., Disp: , Rfl:     ondansetron (ZOFRAN-ODT) 4 MG TbDL, DISSOLVE 1 TABLET BY MOUTH EVERY 6 HOURS AS NEEDED FOR NAUSEA AND VOMITING, Disp: , Rfl:     progesterone (PROMETRIUM) 100 MG capsule, Take 100 mg by mouth., Disp: , Rfl:     propranoloL (INDERAL) 20 MG tablet, TAKE 1 TABLET BY MOUTH TWICE A DAY, Disp: 180 tablet, Rfl: 1    rivaroxaban (XARELTO) 2.5 mg Tab, Take 2.5 mg by mouth 2 (two) times daily., Disp: , Rfl:     traZODone (DESYREL) 150 MG tablet, TAKE 1 TABLET BY MOUTH EVERY EVENING., Disp: 90 tablet, Rfl: 0    triamterene-hydrochlorothiazide 37.5-25 mg (DYAZIDE) 37.5-25 mg per capsule, Take 1 capsule by mouth once daily., Disp: , Rfl:     ubidecarenone (CO Q-10 ORAL), Take by mouth., Disp: , Rfl:     UNABLE TO FIND, UNITHYROID 125 mcg-pt takes 6 times weekly, Disp: , Rfl:     vilazodone (VIIBRYD) 40 mg Tab tablet, Take 1 tablet (40 mg total) by mouth once daily., Disp: 90 tablet, Rfl: 1    vitamin D (VITAMIN D3) 1000 units Tab, Take 1,000 Units by mouth once daily., Disp: , Rfl:     ALLERGIES:  Review of patient's allergies indicates:   Allergen Reactions    Keflex [cephalexin] Swelling     Other reaction(s): throat swelilng    Levofloxacin Diarrhea       EXAM:  Constitutional  Vitals:  Most recent vital signs were reviewed.   Last 3 sets of VS:      10/6/2023     9:59 AM 10/30/2023    11:13 AM 11/14/2023     9:45 AM   Vitals - 1 value per visit   SYSTOLIC 122 118 120   DIASTOLIC 60 64 80   Pulse  55 71   Temp  97.8 °F (36.6 °C)    Resp  14    SPO2  97 % 98 %   Weight (lb)  175.6 176.6   Weight (kg)  79.652 80.105   Height 5' 3" (1.6 m) 5' 3" (1.6 m) 5' 3" (1.6 m)   BMI (Calculated)  31.1 31.3   Pain Score  Zero Zero      General:  Unremarkable, audio only visit     Musculoskeletal  Muscle Strength/Tone:  No tremors appreciated   Gait & Station:  " Unable to assess, Pt seated during virtual visit     Psychiatric  Speech:  no latency; no press   Mood & Affect:  irritable  congruent    Thought Process:  normal and logical   Associations:  intact   Thought Content:  normal, no suicidality, no homicidality, delusions, or paranoia   Insight:  intact   Judgement: behavior is adequate to circumstances   Orientation:  grossly intact   Memory: intact for content of interview   Language: grossly intact   Attention Span & Concentration:  able to focus   Fund of Knowledge:  intact and appropriate to age and level of education     SUICIDE RISK ASSESSMENT:  Protective factors: age, gender, no prior attempts, no prior hospitalizations, no family h/o attempts, no ongoing substance abuse, no psychosis, , has children, denies SI/intent/plan, seeking treatment, access to treatment, future oriented,   Risks:  hx depression, ongoing  anxiety, recent IOP attendance, access to firearms, relational problems with family  Patient is a moderate immediate and long-term risk considering risk factors.  Risk can be ameliorated with medication management and therapy    RELEVANT LABS/STUDIES:    Lab Results   Component Value Date    WBC 7.33 10/04/2023    HGB 11.5 (L) 10/04/2023    HCT 36.5 (L) 10/04/2023    MCV 86 10/04/2023     10/04/2023     BMP  Lab Results   Component Value Date     10/04/2023    K 4.1 10/04/2023     10/04/2023    CO2 28 10/04/2023    BUN 9 10/04/2023    CREATININE 0.65 10/04/2023    CALCIUM 8.7 10/04/2023    ANIONGAP 7 10/04/2023    ESTGFRAFRICA >60 07/07/2022    EGFRNONAA >60 07/07/2022     Lab Results   Component Value Date    ALT 38 (H) 10/04/2023    AST 38 (H) 10/04/2023    ALKPHOS 132 10/04/2023    BILITOT 0.6 10/04/2023     Lab Results   Component Value Date    TSH <0.015 (L) 07/15/2023    TSH <0.015 (L) 07/15/2023     Lab Results   Component Value Date    LABA1C 5.3 03/29/2016    HGBA1C 5.1 07/15/2023       IMPRESSION:    Chloe Obrien  is a 63 y.o. female with history of MDD, KIANA, BED, insomnia, who presents for follow up appointment.    Status/Progress: Based on the examination today, the patient's problem(s) is/are adequately but not ideally controlled.  New problems have not been presented today.   Co-morbidities are complicating management of the primary condition.  There are no active rule-out diagnoses for this patient at this time.     -Patient reports improvement in mood but notes continued irritability.  We will discontinue Wellbutrin XL and start bupropion 75 mg q.a.m. in the hope that this dose will help with mood but not cause irritability.  -Today, patient reports marked improvement in symptoms and states she feels great.  She denies side effects and would prefer to continue current management unchanged.  -marked increase in irritability.  After discussing treatment options with patient we have mutually decided to increase vilazodone to 40 mg daily to target irritability  -long discussion with patient regarding taking medications as prescribed.  Patient has been taking hydroxyzine 25 mg twice daily as opposed to p.r.n. in an effort to decrease anxiety. Through shared decision-making buspirone will be started for generalized anxiety and titrated to a therapeutic dose.  Patient did have a trial of buspirone in 2013 but the dose did not exceed 7.5 mg b.i.d.; patient requests an excuse note for 2 weeks off of work related to current anxiety and irritability as well as increased stress level regarding her job and recent physical ailments.  Note written per patient request.  -anger and irritability markedly reduced.  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  We did discuss that bupropion could be worsening this irritability, however, the patient reports she is taken this for many many years  and she does not believe the bupropion is the cause of her increased irritability.  She does not wish to discontinue bupropion as she has seen significant improvement in mood with it.  We also discussed that her Unithroid dose may need to be adjusted as this could affect irritability as well.  Patient has an appointment with her endocrinologist in 1 month.  We discussed adding an SGA such as aripiprazole for irritability however, patient does not wish to add medications at this time and she is concerned about the possibility of weight gain.  Through shared decision-making buspirone will be increased to 15 mg t.i.d. (patient is currently taking b.i.d.).  Patient agrees to contact me should her irritability worsen.    Risk Parameters:  Patient reports no suicidal ideation  Patient reports no homicidal ideation  Patient reports no self-injurious behavior  Patient reports no violent behavior    DIAGNOSES:    ICD-10-CM ICD-9-CM   1. MDD (major depressive disorder), recurrent episode, mild  F33.0 296.31   2. Generalized anxiety disorder  F41.1 300.02   3. Insomnia, unspecified type  G47.00 780.52   4. Binge eating disorder  F50.81 307.50       PLAN:  Continue vilazodone 40 mg daily for mood, anxiety, and irritability  Continue bupropion 75 mg q.a.m. for mood  INCREASE buspirone from 15 mg b.i.d. to t.i.d. for generalized anxiety and irritability  Continue hydroxyzine 50 mg q.h.s. p.r.n. anxiety/insomnia (Pt agrees to stop taking b.i.d. and to only take p.r.n. for severe anxiety / panic)  Continue trazodone 150 mg q.h.s. p.r.n. insomnia (residual insomnia at 100 mg dose)  Continue L-methylfolate 15 milligrams daily due to decreased MTHFR activity  Pt has discontinued regular individual psychotherapy with ERVIN Price LCSW, but will schedule p.r.n. if needed      RETURN TO CLINIC:   1 month      DANY Garcia, PMHNP-BC      45 minutes of total time spent on the encounter, which includes face to face time and non-face  "to face time preparing to see the patient (eg. review of tests), obtaining and/or reviewing separately obtained history, documenting clinical information in the electronic health record, independently interpreting results (not separately reported), and communicating results to the patient/family/caregiver, or care coordination (not separately reported).     At this time there are no indications the patient represents an imminent danger to either themselves or others; will continue to manage treatment in the outpatient setting.    I discussed the patient's care with the patient including benefits, alternatives, possible adverse effects of the treatment plan; including the potential for metabolic complications, major organ dysfunction, black box warnings, and contraindications. The opportunity was given for questions/clarification, and after this discussion the above treatment plan was devised through shared decision making. The patient voiced their understanding of the diagnoses and treatments listed above and agreed to the treatment plan. Follow up plan was reviewed with the patient. The patient was advised to call to report any worsening of symptoms or problems with medication.    Supportive therapy and psychoeducation provided. Patient has been given crisis information including Suicide and Crisis Lifeline (call or text: 145). Patient also given instructions to go to the nearest ER or call 911 if unable to remain safe or if the Pt develops thoughts of harming self or others.    Documentation entered by me for this encounter may have been done in part using [employer] voice recognition transcription software. Garbled syntax, mangled pronouns, and other bizarre constructions may be attributed to that software system. Although I have made an effort to ensure accuracy, "sound like" errors may exist and should be interpreted in context.    "
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
## 2024-02-01 ENCOUNTER — TELEPHONE (OUTPATIENT)
Dept: PSYCHIATRY | Facility: CLINIC | Age: 64
End: 2024-02-01
Payer: COMMERCIAL

## 2024-02-05 ENCOUNTER — OFFICE VISIT (OUTPATIENT)
Dept: PSYCHIATRY | Facility: CLINIC | Age: 64
End: 2024-02-05
Payer: COMMERCIAL

## 2024-02-05 DIAGNOSIS — G47.00 INSOMNIA, UNSPECIFIED TYPE: ICD-10-CM

## 2024-02-05 DIAGNOSIS — F41.1 GENERALIZED ANXIETY DISORDER: ICD-10-CM

## 2024-02-05 DIAGNOSIS — F33.0 MDD (MAJOR DEPRESSIVE DISORDER), RECURRENT EPISODE, MILD: Primary | ICD-10-CM

## 2024-02-05 DIAGNOSIS — F50.81 BINGE EATING DISORDER: ICD-10-CM

## 2024-02-05 PROCEDURE — 1160F RVW MEDS BY RX/DR IN RCRD: CPT | Mod: CPTII,95,,

## 2024-02-05 PROCEDURE — G2211 COMPLEX E/M VISIT ADD ON: HCPCS | Mod: 95,,,

## 2024-02-05 PROCEDURE — 1159F MED LIST DOCD IN RCRD: CPT | Mod: CPTII,95,,

## 2024-02-05 PROCEDURE — 99214 OFFICE O/P EST MOD 30 MIN: CPT | Mod: 95,,,

## 2024-02-05 RX ORDER — TRAZODONE HYDROCHLORIDE 150 MG/1
150 TABLET ORAL NIGHTLY
Qty: 90 TABLET | Refills: 0 | Status: SHIPPED | OUTPATIENT
Start: 2024-02-05 | End: 2024-05-16 | Stop reason: SDUPTHER

## 2024-02-05 NOTE — PROGRESS NOTES
"OUTPATIENT PSYCHIATRY FOLLOW UP VISIT    Encounter Date: 2/5/2024  Clinical Status of Patient:  Outpatient (Virtual)  The patient location is: [facility] [place] [address]  The patient phone number is: [phone]   Visit type: Virtual visit with synchronous audio and video  Each patient to whom he or she provides medical services by telemedicine is:  (1) informed of the relationship between the practitioner and patient and the respective role of any other health care provider with respect to management of the patient; and (2) notified that he or she may decline to receive medical services by telemedicine and may withdraw from such care at any time.    Chief Complaint:  Chloe Obrien is a 63 y.o. female who presents today for follow-up.  Met with patient.      HISTORY OF PRESENTING ILLNESS:  Chloe Obrien is a 63 y.o. female with history of MDD, KIANA, BED, insomnia, s/p sleeve gastrectomy, CABGx3 who presents for follow up appointment.      6/13/2023: Pharmacogenomic test results reviewed with Pt. Pt has NO major gene-drug interactions found and only 4 medications listed under moderate gene-drug interactions (clozapine, olanzapine, asenapine, and nicotine). Decreased MTHFR activity.     12/4/2023: Mood continues to be "stressed. Tried by my adult children. Instead of yelling at them I just look at them and cry. They're just mean and ugly and they can't get along. It's just been a trying couple of weeks." Daughter came in for Thanksgiving. Other daughter was also there and they don't get along. "And then they put me in the middle by saying they don't want to be around each other. And then I cry and then I get over it. The holidays are always the worst."   Setting good boundaries. Instead of doing things for her children, "I'm working on changing myself to not fall down their rabbit holes."  "I'm feeling emotions but it's a different emotion, instead of being angry I cry over it." Decreased anger and " "irritability.    Denies crying spells when her children are not around.   Continues L-methylfolate    1/8/2024: Patient reports irritability worsened just prior to the Christmas holiday.  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 "It's like I get overstimulated and then I just lose it and the only way to deal with it is to yell and scream."   She also reports feeling more relaxed at other times than she was previously stating I am more able to make jokes, more relaxed."   Only rarely uses Albuterol inhaler.   History hypothyroidism, taking Unithroid, followed by endocrinologist Dr. Lechuga.  Has an appointment with Dr. Lechuga next month.  Patient does report her hair texture has been changing recently.  Discussed that bupropion could be worsening irritability.  Patient states she has taken bupropion for many years and has never experienced irritability before.  She does not wish to discontinue bupropion at this time, as she has seeing significant benefit in mood with this medication  Otherwise, mood has been stable.  Patient denies low mood or crying spells.  Anxiety continues to be well controlled    Plan at last appointment:   Continue vilazodone 40 mg daily for mood, anxiety, and irritability  Continue bupropion 75 mg q.a.m. for mood  INCREASE buspirone from 15 mg b.i.d. to t.i.d. for generalized anxiety and irritability  Continue hydroxyzine 50 mg q.h.s. p.r.n. anxiety/insomnia (Pt agrees to stop taking b.i.d. and to only take p.r.n. for severe anxiety / panic)  Continue trazodone 150 mg q.h.s. p.r.n. insomnia (residual insomnia at 100 mg dose)  Continue L-methylfolate 15 milligrams daily due to decreased MTHFR activity  Pt has discontinued regular individual psychotherapy with ERVIN Price LCSW, but will schedule p.r.n. if needed    Psychotropic medication history:   Ramelteon 8 mg q.h.s., xanax 0.5 mg TID, Adderall IR 15 mg " "BID,   Topamax ("tingling, wicked medicine")  Zoloft, celexa, cymbalta, pristiq (before heart surgery, took off of everything), Lexapro (ineffective), Effexor (ineffective),    Wellbutrin   Unsure of previous trial of rexulti  Denies previous trials of mirtazapipne, Abilify, vraylar      INTERVAL HISTORY:    Buspirone was increased from 15 mg b.i.d. to 15 mg t.i.d. at last visit.  Patient reports improvement in irritability in the interim. 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  She also reports decreased irritability with her family.  She does report difficulty remembering to take the midday dose of buspirone and notes she takes 30 mg at night on day she forgets to take the mid day dose.  She does have an alarm set on her phone for the mid day dose, "but sometimes I just turn it off and forget to take it."  She did see endocrinology in the interim.  No changes were made to levothyroxine.  Mood "has been OK."She does report  occasional epiosodes of low mood, but "it's easier to shake it off now." Low mood lasts for approx 1 hour, "but I just redirect my mind to something else and it goes away."    No interval episodes with symptoms consistent with rosendo or hypomania.  Denied interval or current suicidal/homicidal thoughts, intent, or plan or NSSI.  Denied other questions and concerns.  Patient reports feeling stable and wishing to continue current management unchanged.    Medication side effects: None  Medication adherence: yes    PSYCHIATRIC REVIEW OF SYSTEMS:  Is patient experiencing or having changes in:  Trouble with sleep:  sleeping well with increased dose of trazodone, getting 6-7 hours of sleep, bed at 8:00 and waking up at 2:00  Appetite changes: " " Decreased after restarting Ozempic   Weight changes:  has lost 27 lbs after starting Ozempic  Lack of energy: no  Anhedonia:  no  Somatic symptoms:  no  Anxiety/panic: well controlled  Irritability:  decreasing  Guilty/hopeless: no   Concentration:  "pretty good"  Racing thoughts: no  Impulsive behaviors: no  Paranoia/AVH: no  Self-injurious behavior/risky behavior:  no  Any drugs:  no  Alcohol:  no    MEDICAL REVIEW OF SYSTEMS:   Pain:  +chronic pain  Constitutional:   Denies fever  Cardiovascular: Denies chest pain or exertional dyspnea.  Respiratory: Robbin cough or orthopnea.   GI: Denies abdominal pain, N/V  Neurological: Denies tremor, seizure, or focal weakness.  Psychiatric: See HPI above.    PAST PSYCHIATRIC, MEDICAL, AND SOCIAL HISTORY REVIEWED  The patient's past medical, family and social history have been reviewed and updated as appropriate within the electronic medical record - see encounter notes.    PAST MEDICAL HISTORY:   Past Medical History:   Diagnosis Date    1 PPD X 20+ YRs Tobacco Use Disorder     11/14/18 I Advocated Smoking Cessation And RXd     Anticoagulant long-term use     Borderline Vitamin D Deficiency     10/21/18 And 4/4/16 RXd OTC D3 2K Daily    Chronic Insomnia `    On Restoril 30 Mg qHS PRN, Trazodone 100 Mg qHS Caused Dizziness And Headaches    Chronic Recurrent Left Foreleg Pain     Depression     12/21/16 Decreased Wellbutrin Further And Increased Zoloft To 100 Mg Daily; 11/8/16 Decreased Wellbutrin-XL To 300 Mg qAM And RXd Zoloft 50 Mg qAM; She Sees A Therapist For This; Lexapro Was Innefective    Diabetes mellitus     prediabetic    Diverticulosis Of Sigmoid, Decending, Transverse, and Ascending Colon; W/O Diverticulitis On 9/24/18 EGD     Dr. KIRAN Heck    Gastroparesis     Dr. KIRAN Heck On 5/2/17 Continued Her Post Gastric Sleeve Diet    GERD With H/O Stricture Dilation     Dr. KIRAN Heck    H/O Abnormal Mammogram     F/U Mammograms And U/S Have " "Been Stable    H/O Abnormal Mammogram     F/U Mammograms And U/S Have Been Stable     H/O Adenomatous Colon Polyps     Dr. KIRAN Heck TC On 9/24/18 Rectal Polyps Only; "Repeat TC In 5 YRs"    H/O H. Pylori Gastritis 2009     Dr. KIRAN Heck; Received Triple Tx Then    H/O Nephrolithiasis     6/3/16 ABD/Pelvic CT W/W/O = 1 CM Right Renal Stone Only; 5/26/15 ABD/Pelvic CT With IV Contrast Only = Positive For A Nonobstructing Right Renal Stone    H/O of CABG 11/15/21     CABG X3 With Boutte-Vein-Endovascular (Left)     H/O Recurrent Cyclical Nausea     Dr. KIRAN Heck    H/O Small Rectal Polyps On 9/24/18 TC; Nonbleeding, Biopsies Negative For Malignancy     Dr. Jonh Heck; "Repeat TC IN 5 YRs"    Hypercholesterolemia     On Livalo 4 Mg qHS    Hypertension     no longer taking medication since weight loss    Hypothyroidism     On Emily Thyroid    l Right Axillary Muscle Strain 08/31/2022    Medium Internal Hemorrhoids With Rectal Bleeding On 9/24/18 EGD     Dr. KIRAN Heck; Will Schedule Clinic Visit For Hemorrhoid Banding    Mitral valve prolapse     Nonalcoholic Steatohepatitis (N.A.S.H.)     Dr. KIRAN Heck On 8/18/16 Ordered A Liver U/S And LFTs    Nonobstructive atherosclerosis of coronary artery     Obesity     Obesity S/P Sleeve Gastrectomy With Hiatal hernia Repair 10/2015     Dr. Scot Werner    Ocular Migraines     Osteoporosis     10/21/18 RXd Fosamax 70 Mg Weekly, And OTC D3 2K IU Daily, And OTC CaCO3 600 Mg Daily    Partial tear of subscapularis tendon, left, subsequent encounter     08/2020    Pulmonary Nodules     Dr. Luis F Evans Will Repeat This In 1 Year; 8/7/18 Low Dose Chest CT = 1 Small Nodule In Each Lung    Rectal polyp     Renal disorder     nephrolithiasis    RLQ Pain Due To Epiploic Appendagitis 08/2016     Dr. KIRAN Heck TXd This With Augmentin And Naproxen After Cipro And Flagyl Failed    Rotator cuff syndrome of left shoulder " 08/2020    Rotator cuff syndrome, left 08/10/2020    Rotator cuff syndrome, right 05/10/2021    RUQ Abdominal Pain 5/31/16     6/3/16 ABD/Pelvic CT W/W/O = 1 CM Right Renal Stone Only; 5/31/16 ABD XRays = Unremarkable; 5/31/16 Increased Her PPI To Bid X 1 Week      Head trauma/Loss of consciousness: denies  Seizures: denies     PAST PSYCHIATRIC HISTORY:  Psychiatric Care (current & past):  [facility] 30 years ago  Previous Psychiatric Diagnoses:  Depression and anxiety  Previous Psychiatric Hospitalizations: [facility] 10/16/2022 through 12/21/2022  Previous SI/HI:    Denies  Previous Suicide Attempts or NSSI: denies  History of Psychotherapy:  CBT with LCSW, found helpful  History of Violence: denies    MEDICATIONS:    Current Outpatient Medications:     acetaminophen (TYLENOL) 500 MG tablet, Take 2 tablets (1,000 mg total) by mouth every 6 (six) hours as needed for Pain., Disp: 30 tablet, Rfl: 0    albuterol (PROAIR HFA) 90 mcg/actuation inhaler, Inhale 2 puffs into the lungs every 6 (six) hours as needed for Wheezing or Shortness of Breath. Rescue, Disp: 18 g, Rfl: 5    aspirin (ECOTRIN) 81 MG EC tablet, Take 81 mg by mouth once daily., Disp: , Rfl:     atorvastatin (LIPITOR) 40 MG tablet, TAKE 1 TABLET BY MOUTH EVERY DAY, Disp: 90 tablet, Rfl: 1    azelastine (ASTELIN) 137 mcg (0.1 %) nasal spray, 2 sprays (274 mcg total) by Nasal route as needed., Disp: 90 mL, Rfl: 3    buPROPion (WELLBUTRIN) 75 MG tablet, Take 1 tablet (75 mg total) by mouth every morning., Disp: 90 tablet, Rfl: 1    busPIRone (BUSPAR) 15 MG tablet, Take 1 tablet (15 mg total) by mouth 3 (three) times daily., Disp: 270 tablet, Rfl: 1    cetirizine (ZYRTEC) 10 MG tablet, Take 10 mg by mouth once daily., Disp: , Rfl:     clopidogreL (PLAVIX) 75 mg tablet, Take 75 mg by mouth once daily., Disp: , Rfl:     cyanocobalamin 1,000 mcg/mL injection, Inject 1,000 mcg into the muscle every 30 days., Disp: , Rfl:     ergocalciferol  (ERGOCALCIFEROL) 50,000 unit Cap, TAKE 1 CAPSULE BY MOUTH ONE TIME PER WEEK, Disp: 12 capsule, Rfl: 2    estradioL (ESTRACE) 0.5 MG tablet, Take 0.5 mg by mouth., Disp: , Rfl:     ezetimibe (ZETIA) 10 mg tablet, Take 10 mg by mouth., Disp: , Rfl:     famotidine (PEPCID) 40 MG tablet, Take 40 mg by mouth once daily., Disp: , Rfl:     fluticasone-umeclidin-vilanter (TRELEGY ELLIPTA) 100-62.5-25 mcg DsDv, Inhale 1 puff into the lungs once daily., Disp: 1 each, Rfl: 5    hydrOXYzine pamoate (VISTARIL) 25 MG Cap, TAKE 1 CAPSULE BY MOUTH THREE TIMES A DAY, Disp: 270 capsule, Rfl: 0    Lactobacillus rhamnosus GG (CULTURELLE) 10 billion cell capsule, Take 1 capsule by mouth 2 (two) times a day., Disp: , Rfl:     lansoprazole (PREVACID) 30 MG capsule, Take 30 mg by mouth once daily., Disp: , Rfl:     levothyroxine (SYNTHROID) 125 MCG tablet, Take 125 mcg by mouth before breakfast. Pt takes once weekly, Disp: , Rfl:     montelukast (SINGULAIR) 10 mg tablet, TAKE 1 TABLET BY MOUTH AS NEEDED., Disp: 90 tablet, Rfl: 3    multivit-min/ferrous fumarate (MULTI VITAMIN ORAL), Take by mouth., Disp: , Rfl:     nitroGLYCERIN (NITROSTAT) 0.4 MG SL tablet, PLACE 1 TABLET AS NEEDED UNDER TONGUE ROUTE AS NEEDED., Disp: , Rfl:     olmesartan (BENICAR) 20 MG tablet, Take 20 mg by mouth 2 (two) times daily., Disp: , Rfl:     ondansetron (ZOFRAN-ODT) 4 MG TbDL, DISSOLVE 1 TABLET BY MOUTH EVERY 6 HOURS AS NEEDED FOR NAUSEA AND VOMITING, Disp: , Rfl:     progesterone (PROMETRIUM) 100 MG capsule, Take 100 mg by mouth., Disp: , Rfl:     propranoloL (INDERAL) 20 MG tablet, TAKE 1 TABLET BY MOUTH TWICE A DAY, Disp: 180 tablet, Rfl: 1    rivaroxaban (XARELTO) 2.5 mg Tab, Take 2.5 mg by mouth 2 (two) times daily., Disp: , Rfl:     traZODone (DESYREL) 150 MG tablet, Take 1 tablet (150 mg total) by mouth every evening., Disp: 90 tablet, Rfl: 0    triamterene-hydrochlorothiazide 37.5-25 mg (DYAZIDE) 37.5-25 mg per capsule, Take 1 capsule by mouth once  "daily., Disp: , Rfl:     ubidecarenone (CO Q-10 ORAL), Take by mouth., Disp: , Rfl:     UNABLE TO FIND, UNITHYROID 125 mcg-pt takes 6 times weekly, Disp: , Rfl:     vilazodone (VIIBRYD) 40 mg Tab tablet, Take 1 tablet (40 mg total) by mouth once daily., Disp: 90 tablet, Rfl: 1    vitamin D (VITAMIN D3) 1000 units Tab, Take 1,000 Units by mouth once daily., Disp: , Rfl:     ALLERGIES:  Review of patient's allergies indicates:   Allergen Reactions    Keflex [cephalexin] Swelling     Other reaction(s): throat swelilng    Levofloxacin Diarrhea       EXAM:  Constitutional  Vitals:  Most recent vital signs were reviewed.   Last 3 sets of VS:      10/6/2023     9:59 AM 10/30/2023    11:13 AM 11/14/2023     9:45 AM   Vitals - 1 value per visit   SYSTOLIC 122 118 120   DIASTOLIC 60 64 80   Pulse  55 71   Temp  97.8 °F (36.6 °C)    Resp  14    SPO2  97 % 98 %   Weight (lb)  175.6 176.6   Weight (kg)  79.652 80.105   Height 5' 3" (1.6 m) 5' 3" (1.6 m) 5' 3" (1.6 m)   BMI (Calculated)  31.1 31.3   Pain Score  Zero Zero      General:  Unremarkable, age-appropriate     Musculoskeletal  Muscle Strength/Tone:  No tremors appreciated, no dyskinesia   Gait & Station:  Unable to assess, Pt seated during virtual visit     Psychiatric  Speech:  no latency; no press   Mood & Affect:  euthymic  congruent and appropriate   Thought Process:  normal and logical   Associations:  intact   Thought Content:  normal, no suicidality, no homicidality, delusions, or paranoia   Insight:  intact   Judgement: behavior is adequate to circumstances   Orientation:  grossly intact   Memory: intact for content of interview   Language: grossly intact   Attention Span & Concentration:  able to focus   Fund of Knowledge:  intact and appropriate to age and level of education     SUICIDE RISK ASSESSMENT:  Protective factors: age, gender, no prior attempts, no prior hospitalizations, no family h/o attempts, no ongoing substance abuse, no psychosis, , has " children, denies SI/intent/plan, seeking treatment, access to treatment, future oriented,   Risks:  hx depression, ongoing  anxiety, recent IOP attendance, access to firearms, relational problems with family  Patient is a moderate immediate and long-term risk considering risk factors.  Risk can be ameliorated with medication management and therapy    RELEVANT LABS/STUDIES:    Lab Results   Component Value Date    WBC 6.56 01/20/2024    HGB 12.0 01/20/2024    HCT 37.2 01/20/2024    MCV 88 01/20/2024     (L) 01/20/2024     BMP  Lab Results   Component Value Date     01/20/2024    K 4.3 01/20/2024     01/20/2024    CO2 30 01/20/2024    BUN 13 01/20/2024    CREATININE 0.84 01/20/2024    CALCIUM 9.0 01/20/2024    ANIONGAP 3 (L) 01/20/2024    ESTGFRAFRICA >60 07/07/2022    EGFRNONAA >60 07/07/2022     Lab Results   Component Value Date    ALT 88 (H) 01/20/2024    AST 66 (H) 01/20/2024    ALKPHOS 106 01/20/2024    BILITOT 1.0 01/20/2024     Lab Results   Component Value Date    TSH 1.500 01/20/2024     Lab Results   Component Value Date    LABA1C 5.3 03/29/2016    HGBA1C 5.1 07/15/2023       IMPRESSION:    Chloe Obrien is a 63 y.o. female with history of MDD, KIANA, BED, insomnia, who presents for follow up appointment.    Status/Progress: Based on the examination today, the patient's problem(s) is/are improved and well controlled.  New problems have not been presented today.   Co-morbidities are complicating management of the primary condition.  There are no active rule-out diagnoses for this patient at this time.     -Patient reports improvement in mood but notes continued irritability.  We will discontinue Wellbutrin XL and start bupropion 75 mg q.a.m. in the hope that this dose will help with mood but not cause irritability.  -Today, patient reports marked improvement in symptoms and states she feels great.  She denies side effects and would prefer to continue current management  unchanged.  -marked increase in irritability.  After discussing treatment options with patient we have mutually decided to increase vilazodone to 40 mg daily to target irritability  -long discussion with patient regarding taking medications as prescribed.  Patient has been taking hydroxyzine 25 mg twice daily as opposed to p.r.n. in an effort to decrease anxiety. Through shared decision-making buspirone will be started for generalized anxiety and titrated to a therapeutic dose.  Patient did have a trial of buspirone in 2013 but the dose did not exceed 7.5 mg b.i.d.; patient requests an excuse note for 2 weeks off of work related to current anxiety and irritability as well as increased stress level regarding her job and recent physical ailments.  Note written per patient request.  -anger and irritability markedly reduced.  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  We did discuss that bupropion could be worsening this irritability, however, the patient reports she is taken this for many many years and she does not believe the bupropion is the cause of her increased irritability.  She does not wish to discontinue bupropion as she has seen significant improvement in mood with it.  We also discussed that her Unithroid dose may need to be adjusted as this could affect irritability as well.  Patient has an appointment with her endocrinologist in 1 month.  We discussed adding an SGA such as aripiprazole for irritability however, patient does not wish to add medications at this time and she is concerned about the possibility of weight gain.  Through shared decision-making buspirone will be increased to 15 mg t.i.d. (patient is currently taking b.i.d.).  Patient agrees to contact me should her irritability worsen.  -irritability has significantly improved after increase in  buspirone.  Will continue current treatment plan.    Risk Parameters:  Patient reports no suicidal ideation  Patient reports no homicidal ideation  Patient reports no self-injurious behavior  Patient reports no violent behavior    DIAGNOSES:    ICD-10-CM ICD-9-CM   1. MDD (major depressive disorder), recurrent episode, mild  F33.0 296.31   2. Generalized anxiety disorder  F41.1 300.02   3. Binge eating disorder  F50.81 307.50   4. Insomnia, unspecified type  G47.00 780.52       PLAN:  Continue vilazodone 40 mg daily for mood, anxiety, and irritability  Continue bupropion 75 mg q.a.m. for mood  Continue buspirone from 15 mg t.i.d. for generalized anxiety and irritability  Continue hydroxyzine 50 mg q.h.s. p.r.n. anxiety/insomnia (Pt agrees to stop taking b.i.d. and to only take p.r.n. for severe anxiety / panic)  Continue trazodone 150 mg q.h.s. p.r.n. insomnia (residual insomnia at 100 mg dose)  Continue L-methylfolate 15 milligrams daily due to decreased MTHFR activity  Pt has discontinued regular individual psychotherapy with ERVIN Price LCSW, but will schedule p.r.n. if needed      RETURN TO CLINIC:   6 weeks      DANY Garcia, PMHNP-BC      35 minutes of total time spent on the encounter, which includes face to face time and non-face to face time preparing to see the patient (eg. review of tests), obtaining and/or reviewing separately obtained history, documenting clinical information in the electronic health record, independently interpreting results (not separately reported), and communicating results to the patient/family/caregiver, or care coordination (not separately reported).     At this time there are no indications the patient represents an imminent danger to either themselves or others; will continue to manage treatment in the outpatient setting.    I discussed the patient's care with the patient including benefits, alternatives, possible adverse effects of the treatment plan; including the  "potential for metabolic complications, major organ dysfunction, black box warnings, and contraindications. The opportunity was given for questions/clarification, and after this discussion the above treatment plan was devised through shared decision making. The patient voiced their understanding of the diagnoses and treatments listed above and agreed to the treatment plan. Follow up plan was reviewed with the patient. The patient was advised to call to report any worsening of symptoms or problems with medication.    Supportive therapy and psychoeducation provided. Patient has been given crisis information including Suicide and Crisis Lifeline (call or text: 861). Patient also given instructions to go to the nearest ER or call 911 if unable to remain safe or if the Pt develops thoughts of harming self or others.    Documentation entered by me for this encounter may have been done in part using [facility] Direct voice recognition transcription software. Garbled syntax, mangled pronouns, and other bizarre constructions may be attributed to that software system. Although I have made an effort to ensure accuracy, "sound like" errors may exist and should be interpreted in context.      "

## 2024-04-15 ENCOUNTER — OFFICE VISIT (OUTPATIENT)
Dept: NEPHROLOGY | Facility: CLINIC | Age: 64
End: 2024-04-15
Payer: COMMERCIAL

## 2024-04-15 VITALS
BODY MASS INDEX: 31.29 KG/M2 | DIASTOLIC BLOOD PRESSURE: 56 MMHG | SYSTOLIC BLOOD PRESSURE: 102 MMHG | HEART RATE: 66 BPM | HEIGHT: 63 IN | OXYGEN SATURATION: 99 % | WEIGHT: 176.56 LBS

## 2024-04-15 DIAGNOSIS — N28.1 KIDNEY CYST, ACQUIRED: Primary | ICD-10-CM

## 2024-04-15 DIAGNOSIS — N20.0 CALCULUS OF KIDNEY: ICD-10-CM

## 2024-04-15 PROCEDURE — 1160F RVW MEDS BY RX/DR IN RCRD: CPT | Mod: CPTII,S$GLB,, | Performed by: INTERNAL MEDICINE

## 2024-04-15 PROCEDURE — 99214 OFFICE O/P EST MOD 30 MIN: CPT | Mod: S$GLB,,, | Performed by: INTERNAL MEDICINE

## 2024-04-15 PROCEDURE — 99999 PR PBB SHADOW E&M-EST. PATIENT-LVL V: CPT | Mod: PBBFAC,,, | Performed by: INTERNAL MEDICINE

## 2024-04-15 PROCEDURE — 3074F SYST BP LT 130 MM HG: CPT | Mod: CPTII,S$GLB,, | Performed by: INTERNAL MEDICINE

## 2024-04-15 PROCEDURE — 3078F DIAST BP <80 MM HG: CPT | Mod: CPTII,S$GLB,, | Performed by: INTERNAL MEDICINE

## 2024-04-15 PROCEDURE — 3066F NEPHROPATHY DOC TX: CPT | Mod: CPTII,S$GLB,, | Performed by: INTERNAL MEDICINE

## 2024-04-15 PROCEDURE — 1159F MED LIST DOCD IN RCRD: CPT | Mod: CPTII,S$GLB,, | Performed by: INTERNAL MEDICINE

## 2024-04-15 PROCEDURE — 3008F BODY MASS INDEX DOCD: CPT | Mod: CPTII,S$GLB,, | Performed by: INTERNAL MEDICINE

## 2024-04-15 PROCEDURE — 4010F ACE/ARB THERAPY RXD/TAKEN: CPT | Mod: CPTII,S$GLB,, | Performed by: INTERNAL MEDICINE

## 2024-04-15 NOTE — PROGRESS NOTES
"  Subjective:       Patient ID: Chloe Obrien is a 63 y.o. White female who presents for return patient evaluation for renal stone.      She has no uremic or urinary symptoms and is in her usual state of health.  There have been no recent illnesses, hospitalizations or procedures.       Review of Systems   Constitutional:  Negative for fever.   HENT:  Positive for congestion.    Respiratory:  Positive for shortness of breath (occasional). Negative for cough.    Cardiovascular:  Positive for leg swelling (L ankle at times). Negative for chest pain.   Gastrointestinal:  Negative for nausea and vomiting.   Genitourinary:  Negative for difficulty urinating.   Musculoskeletal:  Positive for gait problem (LLE claudication).   Neurological:  Positive for headaches (allergy).   Psychiatric/Behavioral:  Negative for confusion.        The past medical, family and social histories were reviewed for this encounter.     BP (!) 102/56 (BP Location: Left arm, Patient Position: Sitting, BP Method: Medium (Manual))   Pulse 66   Ht 5' 3" (1.6 m)   Wt 80.1 kg (176 lb 9.4 oz)   SpO2 99%   BMI 31.28 kg/m²     Objective:      Physical Exam  Vitals reviewed.   Constitutional:       General: She is not in acute distress.     Appearance: She is well-developed.   HENT:      Head: Normocephalic and atraumatic.   Eyes:      General: No scleral icterus.     Conjunctiva/sclera: Conjunctivae normal.   Neck:      Vascular: No JVD.   Cardiovascular:      Rate and Rhythm: Normal rate and regular rhythm.      Heart sounds: Normal heart sounds. No murmur heard.     No friction rub. No gallop.   Pulmonary:      Effort: Pulmonary effort is normal. No respiratory distress.      Breath sounds: Normal breath sounds. No wheezing or rales.   Abdominal:      General: Bowel sounds are normal. There is no distension.      Palpations: Abdomen is soft.      Tenderness: There is no abdominal tenderness.   Musculoskeletal:      Cervical back: Normal range of " "motion.      Right lower leg: No edema.      Left lower leg: No edema.   Skin:     General: Skin is warm and dry.      Findings: No rash.   Neurological:      Mental Status: She is alert and oriented to person, place, and time.   Psychiatric:         Mood and Affect: Mood normal.         Behavior: Behavior normal.         Assessment:       1. Kidney cyst, acquired    2. Calculus of kidney        Lab Results   Component Value Date    CREATININE 0.73 04/12/2024    BUN 8 04/12/2024     04/12/2024    K 3.4 (L) 04/12/2024     04/12/2024    CO2 28 04/12/2024     Lab Results   Component Value Date    PTH 61.9 04/12/2024    CALCIUM 8.9 04/12/2024    CAION 1.17 11/16/2021    PHOS 3.8 04/12/2024     Lab Results   Component Value Date    HCT 37.2 01/20/2024     No results found for: "UTPCR"  Plan:   Return to clinic in 12 months.  Labs for next visit include rp pth.    Baseline creatinine is 0.6-0.8.  PTH is 62 with a calcium if 8.9.  OHD is 82.  I asked her to take only one vitamin D supplement but not both.  US shows R 10.2 cm L 11.0 cm with no stones.  On CT she appears to just have some cortical calcification on the right with no definitive stone seen.  I will obtain an US to further evaluate.  She is on 2 forms of viatmin D which I have asked her to address with Jasmyn Cox MD.        "

## 2024-05-15 ENCOUNTER — PATIENT MESSAGE (OUTPATIENT)
Dept: PSYCHIATRY | Facility: CLINIC | Age: 64
End: 2024-05-15
Payer: COMMERCIAL

## 2024-05-16 RX ORDER — TRAZODONE HYDROCHLORIDE 150 MG/1
150 TABLET ORAL NIGHTLY
Qty: 90 TABLET | Refills: 0 | Status: SHIPPED | OUTPATIENT
Start: 2024-05-16

## 2024-07-01 RX ORDER — BUPROPION HYDROCHLORIDE 75 MG/1
75 TABLET ORAL EVERY MORNING
Qty: 30 TABLET | Refills: 0 | Status: SHIPPED | OUTPATIENT
Start: 2024-07-01

## 2024-07-02 RX ORDER — BUSPIRONE HYDROCHLORIDE 15 MG/1
15 TABLET ORAL 3 TIMES DAILY
Qty: 270 TABLET | Refills: 1 | Status: SHIPPED | OUTPATIENT
Start: 2024-07-02

## 2024-07-05 PROBLEM — U07.1 COVID-19 VIRUS INFECTION: Status: ACTIVE | Noted: 2024-07-05

## 2024-07-05 PROBLEM — U07.1 COVID-19: Chronic | Status: RESOLVED | Noted: 2022-01-13 | Resolved: 2024-07-05

## 2024-07-09 ENCOUNTER — OFFICE VISIT (OUTPATIENT)
Dept: PSYCHIATRY | Facility: CLINIC | Age: 64
End: 2024-07-09
Payer: COMMERCIAL

## 2024-07-09 DIAGNOSIS — F33.40 RECURRENT MAJOR DEPRESSIVE DISORDER, IN REMISSION: Primary | ICD-10-CM

## 2024-07-09 DIAGNOSIS — G47.00 INSOMNIA, UNSPECIFIED TYPE: ICD-10-CM

## 2024-07-09 DIAGNOSIS — F50.81 BINGE EATING DISORDER: ICD-10-CM

## 2024-07-09 DIAGNOSIS — F41.1 GENERALIZED ANXIETY DISORDER: ICD-10-CM

## 2024-07-09 PROCEDURE — 1160F RVW MEDS BY RX/DR IN RCRD: CPT | Mod: CPTII,95,,

## 2024-07-09 PROCEDURE — G2211 COMPLEX E/M VISIT ADD ON: HCPCS | Mod: 95,,,

## 2024-07-09 PROCEDURE — 4010F ACE/ARB THERAPY RXD/TAKEN: CPT | Mod: CPTII,95,,

## 2024-07-09 PROCEDURE — 99215 OFFICE O/P EST HI 40 MIN: CPT | Mod: 95,,,

## 2024-07-09 PROCEDURE — 3066F NEPHROPATHY DOC TX: CPT | Mod: CPTII,95,,

## 2024-07-09 PROCEDURE — 1159F MED LIST DOCD IN RCRD: CPT | Mod: CPTII,95,,

## 2024-07-09 RX ORDER — VILAZODONE HYDROCHLORIDE 40 MG/1
40 TABLET ORAL DAILY
Qty: 90 TABLET | Refills: 1 | Status: SHIPPED | OUTPATIENT
Start: 2024-07-09

## 2024-07-09 RX ORDER — BUPROPION HYDROCHLORIDE 75 MG/1
75 TABLET ORAL EVERY MORNING
Qty: 90 TABLET | Refills: 1 | Status: SHIPPED | OUTPATIENT
Start: 2024-07-09

## 2024-07-09 NOTE — PROGRESS NOTES
"OUTPATIENT PSYCHIATRY FOLLOW UP VISIT    Encounter Date: 7/9/2024  Clinical Status of Patient:  Outpatient (Virtual)  The patient location is: [facility] [place] [address]  The patient phone number is: [phone]   Visit type: Virtual visit with synchronous audio and video  Each patient to whom he or she provides medical services by telemedicine is:  (1) informed of the relationship between the practitioner and patient and the respective role of any other health care provider with respect to management of the patient; and (2) notified that he or she may decline to receive medical services by telemedicine and may withdraw from such care at any time.    Chief Complaint:  Chloe Obrien is a 63 y.o. female who presents today for follow-up.  Met with patient.      HISTORY OF PRESENTING ILLNESS:  Chloe Obrien is a 63 y.o. female with history of MDD, KIANA, BED, insomnia, s/p sleeve gastrectomy, CABGx3 who presents for follow up appointment.      6/13/2023: Pharmacogenomic test results reviewed with Pt. Pt has NO major gene-drug interactions found and only 4 medications listed under moderate gene-drug interactions (clozapine, olanzapine, asenapine, and nicotine). Decreased MTHFR activity.     2/5/2024: Buspirone was increased from 15 mg b.i.d. to 15 mg t.i.d. at last visit.  Patient reports improvement in irritability in the interim. 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  She also reports decreased irritability with her family.  She does report difficulty remembering to take the midday dose of buspirone and notes she takes 30 mg at night on day she forgets to take the mid day dose.  She does have an alarm set on her phone for the mid day dose, "but " "sometimes I just turn it off and forget to take it."  She did see endocrinology in the interim.  No changes were made to levothyroxine.  Mood "has been OK."She does report  occasional epiosodes of low mood, but "it's easier to shake it off now." Low mood lasts for approx 1 hour, "but I just redirect my mind to something else and it goes away."    Plan at last appointment:   Continue vilazodone 40 mg daily for mood, anxiety, and irritability  Continue bupropion 75 mg q.a.m. for mood  Continue buspirone from 15 mg t.i.d. for generalized anxiety and irritability  Continue hydroxyzine 50 mg q.h.s. p.r.n. anxiety/insomnia (Pt agrees to stop taking b.i.d. and to only take p.r.n. for severe anxiety / panic)  Continue trazodone 150 mg q.h.s. p.r.n. insomnia (residual insomnia at 100 mg dose)  Continue L-methylfolate 15 milligrams daily due to decreased MTHFR activity  Pt has discontinued regular individual psychotherapy with ERVIN Price LCSW, but will schedule p.r.n. if needed    Psychotropic medication history:   Ramelteon 8 mg q.h.s., xanax 0.5 mg TID, Adderall IR 15 mg BID,   Topamax ("tingling, wicked medicine")  Zoloft, celexa, cymbalta, pristiq (before heart surgery, took off of everything), Lexapro (ineffective), Effexor (ineffective),    Wellbutrin   Unsure of previous trial of rexulti  Denies previous trials of mirtazapipne, Abilify, vraylar      INTERVAL HISTORY:    Today, Ms. Obrien reports that her mood has been stable prior to the death of her father 2 weeks ago. "The reality of it all is starting to hit me. I'm getting a little depressed. Other than that I've been doing really good on the medication."    She denies depressed mood instead reporting sad mood. She states she is taking care of her grandchildren who will be at her house for the next 2 weeks, which is keeping her busy. Due to this she feels like she hasn't had a chance to process her feelings about her father's death. States "I'm scared I'm " "going to hit a wall when they leave" and become depressed. She has booked a vacation in 3 weeks, "hopefully that will help me feel better."     She also notes increasing anxiety over the last week. Denies panic attacks. Has not needed hydroxyzine in the interim.     No interval episodes with symptoms consistent with rosendo or hypomania.  Denied interval or current suicidal/homicidal thoughts, intent, or plan or NSSI.  Denied other questions and concerns.    Medication side effects: None  Medication adherence: yes    PSYCHIATRIC REVIEW OF SYSTEMS:  Is patient experiencing or having changes in:  Trouble with sleep:  difficulty sleeping over the last week; previously sleeping well with increased dose of trazodone, getting 6-7 hours of sleep, bed at 8:00 and waking up at 2:00  Appetite changes:  increased after the loss of her father 2 weeks ago   Weight changes:  no  Lack of energy: no  Anhedonia:  no  Somatic symptoms:  no  Anxiety/panic: well controlled  Irritability: increasing  Guilty/hopeless: no   Concentration: no  Racing thoughts: no  Impulsive behaviors: no  Paranoia/AVH: no  Self-injurious behavior/risky behavior:  no  Any drugs:  no  Alcohol:  no    MEDICAL REVIEW OF SYSTEMS:   Pain:  +chronic pain  Constitutional:   Denies fever  Cardiovascular: Denies chest pain or exertional dyspnea.  Respiratory: Robbin cough or orthopnea.   GI: Denies abdominal pain, N/V  Neurological: Denies tremor, seizure, or focal weakness.  Psychiatric: See HPI above.    PAST PSYCHIATRIC, MEDICAL, AND SOCIAL HISTORY REVIEWED  The patient's past medical, family and social history have been reviewed and updated as appropriate within the electronic medical record - see encounter notes.    PAST MEDICAL HISTORY:   Past Medical History:   Diagnosis Date    1 PPD X 20+ YRs Tobacco Use Disorder     11/14/18 I Advocated Smoking Cessation And RXd     Anticoagulant long-term use     Borderline Vitamin D Deficiency     10/21/18 And 4/4/16 RXd " "OTC D3 2K Daily    Chronic Insomnia `    On Restoril 30 Mg qHS PRN, Trazodone 100 Mg qHS Caused Dizziness And Headaches    Chronic Recurrent Left Foreleg Pain     Depression     12/21/16 Decreased Wellbutrin Further And Increased Zoloft To 100 Mg Daily; 11/8/16 Decreased Wellbutrin-XL To 300 Mg qAM And RXd Zoloft 50 Mg qAM; She Sees A Therapist For This; Lexapro Was Innefective    Diabetes mellitus     prediabetic    Diverticulosis Of Sigmoid, Decending, Transverse, and Ascending Colon; W/O Diverticulitis On 9/24/18 EGD     Dr. KIARN Heck    Gastroparesis     Dr. KIRAN Heck On 5/2/17 Continued Her Post Gastric Sleeve Diet    GERD With H/O Stricture Dilation     Dr. KIRAN Heck    H/O Abnormal Mammogram     F/U Mammograms And U/S Have Been Stable    H/O Abnormal Mammogram     F/U Mammograms And U/S Have Been Stable     H/O Adenomatous Colon Polyps     Dr. KIRAN Heck TC On 9/24/18 Rectal Polyps Only; "Repeat TC In 5 YRs"    H/O H. Pylori Gastritis 2009     Dr. KIRAN Heck; Received Triple Tx Then    H/O Nephrolithiasis     6/3/16 ABD/Pelvic CT W/W/O = 1 CM Right Renal Stone Only; 5/26/15 ABD/Pelvic CT With IV Contrast Only = Positive For A Nonobstructing Right Renal Stone    H/O of CABG 11/15/21     CABG X3 With [place]-[facility]-Endovascular (Left)     H/O Recurrent Cyclical Nausea     Dr. KIRAN Heck    H/O Small Rectal Polyps On 9/24/18 TC; Nonbleeding, Biopsies Negative For Malignancy     Dr. Jonh Heck; "Repeat TC IN 5 YRs"    Hypercholesterolemia     On Livalo 4 Mg qHS    Hypertension     no longer taking medication since weight loss    Hypothyroidism     On Berkley Thyroid    Medium Internal Hemorrhoids With Rectal Bleeding On 9/24/18 EGD     Dr. KIRAN Heck; Will Schedule Clinic Visit For Hemorrhoid Banding    Mitral valve prolapse     Nonalcoholic Steatohepatitis (N.A.S.H.)     Dr. KIRAN Heck On 8/18/16 Ordered A Liver U/S And LFTs    " Nonobstructive atherosclerosis of coronary artery     Obesity     Obesity S/P Sleeve Gastrectomy With Hiatal hernia Repair 10/2015     Dr. Scot Werner    Ocular Migraines     Osteoporosis     10/21/18 RXd Fosamax 70 Mg Weekly, And OTC D3 2K IU Daily, And OTC CaCO3 600 Mg Daily    Partial tear of subscapularis tendon, left, subsequent encounter     08/2020    Pulmonary Nodules     Dr. Luis F Evans Will Repeat This In 1 Year; 8/7/18 Low Dose Chest CT = 1 Small Nodule In Each Lung    Rectal polyp     Renal disorder     nephrolithiasis    RLQ Pain Due To Epiploic Appendagitis 08/2016     Dr. KIRAN Heck TXd This With Augmentin And Naproxen After Cipro And Flagyl Failed    Rotator cuff syndrome of left shoulder 08/2020    Rotator cuff syndrome, left 08/10/2020    Rotator cuff syndrome, right 05/10/2021    RUQ Abdominal Pain 5/31/16     6/3/16 ABD/Pelvic CT W/W/O = 1 CM Right Renal Stone Only; 5/31/16 ABD XRays = Unremarkable; 5/31/16 Increased Her PPI To Bid X 1 Week      Head trauma/Loss of consciousness: denies  Seizures: denies     PAST PSYCHIATRIC HISTORY:  Psychiatric Care (current & past):  [facility] 30 years ago  Previous Psychiatric Diagnoses:  Depression and anxiety  Previous Psychiatric Hospitalizations: [facility] 10/16/2022 through 12/21/2022  Previous SI/HI:    Denies  Previous Suicide Attempts or NSSI: denies  History of Psychotherapy:  CBT with LCSW, found helpful  History of Violence: denies    MEDICATIONS:    Current Outpatient Medications:     acetaminophen (TYLENOL) 500 MG tablet, Take 2 tablets (1,000 mg total) by mouth every 6 (six) hours as needed for Pain., Disp: 30 tablet, Rfl: 0    albuterol (PROAIR HFA) 90 mcg/actuation inhaler, Inhale 2 puffs into the lungs every 6 (six) hours as needed for Wheezing or Shortness of Breath. Rescue, Disp: 18 g, Rfl: 5    aspirin (ECOTRIN) 81 MG EC tablet, Take 81 mg by mouth once daily., Disp: , Rfl:     atorvastatin (LIPITOR) 40  MG tablet, TAKE 1 TABLET BY MOUTH EVERY DAY, Disp: 90 tablet, Rfl: 1    azelastine (ASTELIN) 137 mcg (0.1 %) nasal spray, 2 sprays (274 mcg total) by Nasal route as needed., Disp: 90 mL, Rfl: 3    buPROPion (WELLBUTRIN) 75 MG tablet, Take 1 tablet (75 mg total) by mouth every morning., Disp: 90 tablet, Rfl: 1    busPIRone (BUSPAR) 15 MG tablet, TAKE 1 TABLET BY MOUTH 3 TIMES DAILY., Disp: 270 tablet, Rfl: 1    cetirizine (ZYRTEC) 10 MG tablet, Take 10 mg by mouth once daily., Disp: , Rfl:     clopidogreL (PLAVIX) 75 mg tablet, Take 75 mg by mouth once daily., Disp: , Rfl:     cyanocobalamin 1,000 mcg/mL injection, Inject 1,000 mcg into the muscle every 30 days., Disp: , Rfl:     ergocalciferol (ERGOCALCIFEROL) 50,000 unit Cap, TAKE 1 CAPSULE BY MOUTH ONE TIME PER WEEK, Disp: 12 capsule, Rfl: 2    estradioL (ESTRACE) 0.5 MG tablet, Take 0.5 mg by mouth., Disp: , Rfl:     ezetimibe (ZETIA) 10 mg tablet, Take 10 mg by mouth., Disp: , Rfl:     famotidine (PEPCID) 40 MG tablet, Take 40 mg by mouth once daily., Disp: , Rfl:     fluticasone-umeclidin-vilanter (TRELEGY ELLIPTA) 100-62.5-25 mcg DsDv, Inhale 1 puff into the lungs once daily., Disp: 1 each, Rfl: 5    hydrOXYzine pamoate (VISTARIL) 25 MG Cap, TAKE 1 CAPSULE BY MOUTH THREE TIMES A DAY, Disp: 270 capsule, Rfl: 0    Lactobacillus rhamnosus GG (CULTURELLE) 10 billion cell capsule, Take 1 capsule by mouth 2 (two) times a day., Disp: , Rfl:     lansoprazole (PREVACID) 30 MG capsule, Take 30 mg by mouth once daily., Disp: , Rfl:     levothyroxine (SYNTHROID) 125 MCG tablet, Take 125 mcg by mouth before breakfast. Pt takes once weekly, Disp: , Rfl:     methylPREDNISolone (MEDROL DOSEPACK) 4 mg tablet, use as directed, Disp: 21 each, Rfl: 0    montelukast (SINGULAIR) 10 mg tablet, TAKE 1 TABLET BY MOUTH AS NEEDED., Disp: 90 tablet, Rfl: 3    multivit-min/ferrous fumarate (MULTI VITAMIN ORAL), Take by mouth., Disp: , Rfl:     nitroGLYCERIN (NITROSTAT) 0.4 MG SL tablet, ,  "Disp: , Rfl:     olmesartan (BENICAR) 20 MG tablet, Take 20 mg by mouth 2 (two) times daily., Disp: , Rfl:     ondansetron (ZOFRAN-ODT) 4 MG TbDL, DISSOLVE 1 TABLET BY MOUTH EVERY 6 HOURS AS NEEDED FOR NAUSEA AND VOMITING, Disp: , Rfl:     progesterone (PROMETRIUM) 100 MG capsule, Take 100 mg by mouth., Disp: , Rfl:     propranoloL (INDERAL) 20 MG tablet, TAKE 1 TABLET BY MOUTH TWICE A DAY, Disp: 180 tablet, Rfl: 1    rivaroxaban (XARELTO) 2.5 mg Tab, Take 2.5 mg by mouth 2 (two) times daily., Disp: , Rfl:     traZODone (DESYREL) 150 MG tablet, Take 1 tablet (150 mg total) by mouth every evening., Disp: 90 tablet, Rfl: 0    triamterene-hydrochlorothiazide 37.5-25 mg (DYAZIDE) 37.5-25 mg per capsule, Take 1 capsule by mouth once daily., Disp: , Rfl:     ubidecarenone (CO Q-10 ORAL), Take by mouth., Disp: , Rfl:     UNABLE TO FIND, UNITHYROID 125 mcg-pt takes 6 times weekly, Disp: , Rfl:     vilazodone (VIIBRYD) 40 mg Tab tablet, Take 1 tablet (40 mg total) by mouth once daily., Disp: 90 tablet, Rfl: 1    vitamin D (VITAMIN D3) 1000 units Tab, Take 1,000 Units by mouth once daily., Disp: , Rfl:     zinc sulfate (ZINCATE) 50 mg zinc (220 mg) capsule, Take 1 capsule (220 mg total) by mouth 2 (two) times daily. for 5 days, Disp: 10 capsule, Rfl: 0    ALLERGIES:  Review of patient's allergies indicates:   Allergen Reactions    Keflex [cephalexin] Swelling     Other reaction(s): throat swelilng    Levofloxacin Diarrhea       EXAM:  Constitutional  Vitals:  Most recent vital signs were reviewed.   Last 3 sets of VS:      11/14/2023     9:45 AM 4/15/2024     9:41 AM 7/5/2024    10:00 AM   Vitals - 1 value per visit   SYSTOLIC 120 102 118   DIASTOLIC 80 56 70   Pulse 71 66 69   Temp   98.1 °F (36.7 °C)   SPO2 98 % 99 % 98 %   Weight (lb) 176.6 176.59 158.9   Weight (kg) 80.105 80.1 72.077   Height 5' 3" (1.6 m) 5' 3" (1.6 m) 5' 3" (1.6 m)   BMI (Calculated) 31.3 31.3 28.2   Pain Score Zero  Zero      General:  Unremarkable, " age-appropriate     Musculoskeletal  Muscle Strength/Tone:  No tremors appreciated, no dyskinesia   Gait & Station:  Pt seated during virtual visit     Psychiatric  Speech:  no latency; no press   Mood & Affect:  sad  congruent and tearful at times   Thought Process:  normal and logical   Associations:  intact   Thought Content:  normal, no suicidality, no homicidality, delusions, or paranoia   Insight:  intact   Judgement: behavior is adequate to circumstances   Orientation:  grossly intact   Memory: intact for content of interview   Language: grossly intact   Attention Span & Concentration:  Intact to interview   Fund of Knowledge:  Not formally tested     SUICIDE RISK ASSESSMENT:  Protective factors: age, gender, no prior attempts, no prior hospitalizations, no family h/o attempts, no ongoing substance abuse, no psychosis, , has children, denies SI/intent/plan, seeking treatment, access to treatment, future oriented,   Risks:  hx depression, ongoing  anxiety, recent IOP attendance, access to firearms, relational problems with family  Patient is a moderate immediate and long-term risk considering risk factors.  Risk can be ameliorated with medication management and therapy    RELEVANT LABS/STUDIES:    Lab Results   Component Value Date    WBC 6.56 01/20/2024    HGB 12.0 01/20/2024    HCT 37.2 01/20/2024    MCV 88 01/20/2024     (L) 01/20/2024     BMP  Lab Results   Component Value Date     04/12/2024    K 3.4 (L) 04/12/2024     04/12/2024    CO2 28 04/12/2024    BUN 8 04/12/2024    CREATININE 0.73 04/12/2024    CALCIUM 8.9 04/12/2024    ANIONGAP 5 04/12/2024    ESTGFRAFRICA >60 07/07/2022    EGFRNONAA >60 07/07/2022     Lab Results   Component Value Date    ALT 88 (H) 01/20/2024    AST 66 (H) 01/20/2024    ALKPHOS 106 01/20/2024    BILITOT 1.0 01/20/2024     Lab Results   Component Value Date    TSH 1.500 01/20/2024     Lab Results   Component Value Date    LABA1C 5.3 03/29/2016    HGBA1C  5.1 07/15/2023       IMPRESSION:    Chloe Obrien is a 63 y.o. female with history of MDD, KIANA, BED, insomnia, who presents for follow up appointment.    Status/Progress: Based on the examination today, the patient's problem(s) is/are adequately but not ideally controlled.  New problems have not been presented today.   Co-morbidities are complicating management of the primary condition.  There are no active rule-out diagnoses for this patient at this time.     -Patient reports improvement in mood but notes continued irritability.  We will discontinue Wellbutrin XL and start bupropion 75 mg q.a.m. in the hope that this dose will help with mood but not cause irritability.  -Today, patient reports marked improvement in symptoms and states she feels great.  She denies side effects and would prefer to continue current management unchanged.  -marked increase in irritability.  After discussing treatment options with patient we have mutually decided to increase vilazodone to 40 mg daily to target irritability  -long discussion with patient regarding taking medications as prescribed.  Patient has been taking hydroxyzine 25 mg twice daily as opposed to p.r.n. in an effort to decrease anxiety. Through shared decision-making buspirone will be started for generalized anxiety and titrated to a therapeutic dose.  Patient did have a trial of buspirone in 2013 but the dose did not exceed 7.5 mg b.i.d.; patient requests an excuse note for 2 weeks off of work related to current anxiety and irritability as well as increased stress level regarding her job and recent physical ailments.  Note written per patient request.  -anger and irritability markedly reduced.  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  We did discuss that bupropion could be worsening this irritability, however, the  patient reports she is taken this for many many years and she does not believe the bupropion is the cause of her increased irritability.  She does not wish to discontinue bupropion as she has seen significant improvement in mood with it.  We also discussed that her Unithroid dose may need to be adjusted as this could affect irritability as well.  Patient has an appointment with her endocrinologist in 1 month.  We discussed adding an SGA such as aripiprazole for irritability however, patient does not wish to add medications at this time and she is concerned about the possibility of weight gain.  Through shared decision-making buspirone will be increased to 15 mg t.i.d. (patient is currently taking b.i.d.).  Patient agrees to contact me should her irritability worsen.  -irritability has significantly improved after increase in buspirone.  Will continue current treatment plan.  -Pt reports sad mood after the death of her father 2 weeks ago, though she denies depressed mood. She does not wish to make changes to medications at this time. She agrees to contact me should her mood worsen.     Risk Parameters:  Patient reports no suicidal ideation  Patient reports no homicidal ideation  Patient reports no self-injurious behavior  Patient reports no violent behavior    DIAGNOSES:    ICD-10-CM ICD-9-CM   1. Recurrent major depressive disorder, in remission  F33.40 296.35   2. Generalized anxiety disorder  F41.1 300.02   3. Insomnia, unspecified type  G47.00 780.52   4. Binge eating disorder  F50.81 307.50         PLAN:  Continue vilazodone 40 mg daily for mood, anxiety, and irritability  Continue bupropion 75 mg q.a.m. for mood  Continue buspirone from 15 mg t.i.d. for generalized anxiety and irritability  Continue hydroxyzine 50 mg q.h.s. p.r.n. anxiety/insomnia (Pt agrees to stop taking b.i.d. and to only take p.r.n. for severe anxiety / panic)  Continue trazodone 150 mg q.h.s. p.r.n. insomnia (residual insomnia at 100 mg  dose)  Continue L-methylfolate 15 milligrams daily due to decreased MTHFR activity  Pt has discontinued regular individual psychotherapy with ERVIN Price LCSW, but will schedule p.r.n. if needed      RETURN TO CLINIC:   3 months      DANY Garcia, PMHNP-BC      45 minutes of total time spent on the encounter, which includes face to face time and non-face to face time preparing to see the patient (eg. review of tests), obtaining and/or reviewing separately obtained history, documenting clinical information in the electronic health record, independently interpreting results (not separately reported), and communicating results to the patient/family/caregiver, or care coordination (not separately reported).     At this time there are no indications the patient represents an imminent danger to either themselves or others; will continue to manage treatment in the outpatient setting.    I discussed the patient's care with the patient including benefits, alternatives, possible adverse effects of the treatment plan; including the potential for metabolic complications, major organ dysfunction, black box warnings, and contraindications. The opportunity was given for questions/clarification, and after this discussion the above treatment plan was devised through shared decision making. The patient voiced their understanding of the diagnoses and treatments listed above and agreed to the treatment plan. Follow up plan was reviewed with the patient. The patient was advised to call to report any worsening of symptoms or problems with medication.    Supportive therapy and psychoeducation provided. Patient has been given crisis information including Suicide and Crisis Lifeline (call or text: 128). Patient also given instructions to go to the nearest ER or call 911 if unable to remain safe or if the Pt develops thoughts of harming self or others.    Documentation entered by me for this encounter may have been done in part using  "[employer] Direct voice recognition transcription software. Garbled syntax, mangled pronouns, and other bizarre constructions may be attributed to that software system. Although I have made an effort to ensure accuracy, "sound like" errors may exist and should be interpreted in context.  "

## 2024-08-15 RX ORDER — TRAZODONE HYDROCHLORIDE 150 MG/1
150 TABLET ORAL NIGHTLY
Qty: 90 TABLET | Refills: 0 | Status: SHIPPED | OUTPATIENT
Start: 2024-08-15

## 2024-10-09 ENCOUNTER — OFFICE VISIT (OUTPATIENT)
Dept: GASTROENTEROLOGY | Facility: CLINIC | Age: 64
End: 2024-10-09
Payer: COMMERCIAL

## 2024-10-09 VITALS — HEIGHT: 63 IN | BODY MASS INDEX: 27.93 KG/M2 | WEIGHT: 157.63 LBS

## 2024-10-09 DIAGNOSIS — K52.9 CHRONIC DIARRHEA: ICD-10-CM

## 2024-10-09 DIAGNOSIS — Z98.84 HISTORY OF BARIATRIC SURGERY: ICD-10-CM

## 2024-10-09 DIAGNOSIS — K75.81 NASH (NONALCOHOLIC STEATOHEPATITIS): ICD-10-CM

## 2024-10-09 DIAGNOSIS — R13.10 ODYNOPHAGIA: ICD-10-CM

## 2024-10-09 DIAGNOSIS — Z80.0 FAMILY HISTORY OF STOMACH CANCER: ICD-10-CM

## 2024-10-09 DIAGNOSIS — K44.9 HIATAL HERNIA: ICD-10-CM

## 2024-10-09 DIAGNOSIS — R12 HEARTBURN: Primary | ICD-10-CM

## 2024-10-09 DIAGNOSIS — Z86.19 HISTORY OF HELICOBACTER PYLORI INFECTION: ICD-10-CM

## 2024-10-09 DIAGNOSIS — R10.13 EPIGASTRIC PAIN: ICD-10-CM

## 2024-10-09 DIAGNOSIS — Z86.0100 HISTORY OF COLON POLYPS: ICD-10-CM

## 2024-10-09 DIAGNOSIS — R79.89 ELEVATED LFTS: ICD-10-CM

## 2024-10-09 DIAGNOSIS — R11.0 NAUSEA: ICD-10-CM

## 2024-10-09 DIAGNOSIS — R13.14 PHARYNGOESOPHAGEAL DYSPHAGIA: ICD-10-CM

## 2024-10-09 DIAGNOSIS — Z79.01 ANTICOAGULANT LONG-TERM USE: ICD-10-CM

## 2024-10-09 DIAGNOSIS — Z90.49 S/P CHOLECYSTECTOMY: ICD-10-CM

## 2024-10-09 PROCEDURE — 3066F NEPHROPATHY DOC TX: CPT | Mod: CPTII,S$GLB,, | Performed by: NURSE PRACTITIONER

## 2024-10-09 PROCEDURE — 3044F HG A1C LEVEL LT 7.0%: CPT | Mod: CPTII,S$GLB,, | Performed by: NURSE PRACTITIONER

## 2024-10-09 PROCEDURE — 99204 OFFICE O/P NEW MOD 45 MIN: CPT | Mod: S$GLB,,, | Performed by: NURSE PRACTITIONER

## 2024-10-09 PROCEDURE — 1159F MED LIST DOCD IN RCRD: CPT | Mod: CPTII,S$GLB,, | Performed by: NURSE PRACTITIONER

## 2024-10-09 PROCEDURE — 1160F RVW MEDS BY RX/DR IN RCRD: CPT | Mod: CPTII,S$GLB,, | Performed by: NURSE PRACTITIONER

## 2024-10-09 PROCEDURE — 99999 PR PBB SHADOW E&M-EST. PATIENT-LVL V: CPT | Mod: PBBFAC,,, | Performed by: NURSE PRACTITIONER

## 2024-10-09 PROCEDURE — 4010F ACE/ARB THERAPY RXD/TAKEN: CPT | Mod: CPTII,S$GLB,, | Performed by: NURSE PRACTITIONER

## 2024-10-09 PROCEDURE — 3008F BODY MASS INDEX DOCD: CPT | Mod: CPTII,S$GLB,, | Performed by: NURSE PRACTITIONER

## 2024-10-09 RX ORDER — OMEPRAZOLE 40 MG/1
40 CAPSULE, DELAYED RELEASE ORAL
Qty: 60 CAPSULE | Refills: 1 | Status: SHIPPED | OUTPATIENT
Start: 2024-10-09

## 2024-10-09 RX ORDER — SEMAGLUTIDE 2.68 MG/ML
2 INJECTION, SOLUTION SUBCUTANEOUS WEEKLY
COMMUNITY
Start: 2024-09-20

## 2024-10-09 NOTE — PROGRESS NOTES
"Subjective:       Patient ID: Chloe Obiren is a 63 y.o. female Body mass index is 27.92 kg/m².    Chief Complaint: Gastroesophageal Reflux and Nausea    This patient is new to me.     Patient has previously seen Dr. Heck and Dr. Simms for her GI care. Patient requesting to establish her GI care with [facility] with Dr. Lawrence. Patient did not bring copy of outside medical records.    GI Problem  The primary symptoms include weight loss (taking ozempic currently (been taking for about a year), waiting to start mounjaro; trying to lose weight), abdominal pain, nausea (occasional, maybe once a week; zofran PRN) and diarrhea. Primary symptoms do not include fever, fatigue, vomiting, melena, hematochezia or dysuria.   The abdominal pain began more than 2 days ago (chronic for several years, "ever since h pylori" which was in 2009). The abdominal pain has been unchanged since its onset. The abdominal pain is located in the epigastric region (intermittent; described as cramping & nauseating). The severity of the abdominal pain is 0/10 (currently). The abdominal pain is relieved by belching and passing flatus (gas-x PRN- takes about twice a week).   The diarrhea began more than 1 week ago (started several years; improving since taking ozempic for the past year). The diarrhea is watery and semi-solid. The diarrhea occurs 2 to 4 times per day. Risk factors: recent antibiotics (for sinus infections); denies recent hospitalization, foreign travel, ill contacts, or suspect food intake.   The illness is also significant for dysphagia (occasional, can occur with food, liquids, pills, or her saliva; EGD with dilation helped in the past) and odynophagia (occasional). The illness does not include chills or constipation. Associated symptoms comments: TREATMENT: probiotic BID  PAST TREATMENT: questran- no relief, reglan. Significant associated medical issues include GERD (chronic; heartburn occurs maybe 1-2 times a week " (occurs with eating a trigger food) despite taking prevacid 30 mg twice daily, pepcid 40 mg once daily PRN- takes 2-3 times a month; PAST: TREATMENT: protonix, nexium, dexilant), liver disease (history of CLEMENT), irritable bowel syndrome, hemorrhoids (s/p hemorrhoidectomy with Dr. Leal) and diverticulitis. Associated medical issues do not include inflammatory bowel disease. Associated medical issues comments: history of gastroparesis; s/p gastric sleeve; s/p cholecystectomy.     Review of Systems   Constitutional:  Positive for weight loss (taking ozempic currently (been taking for about a year), waiting to start mounjaro; trying to lose weight). Negative for appetite change, chills, fatigue and fever.   HENT:  Positive for trouble swallowing. Negative for sore throat.    Respiratory:  Negative for cough, choking and shortness of breath.    Cardiovascular:  Negative for chest pain.   Gastrointestinal:  Positive for abdominal pain, diarrhea, dysphagia (occasional, can occur with food, liquids, pills, or her saliva; EGD with dilation helped in the past) and nausea (occasional, maybe once a week; zofran PRN). Negative for anal bleeding, blood in stool, constipation, hematochezia, melena, rectal pain and vomiting.   Genitourinary:  Negative for difficulty urinating, dysuria and flank pain.   Neurological:  Negative for weakness.       No LMP recorded. Patient has had a hysterectomy.  Past Medical History:   Diagnosis Date    1 PPD X 20+ YRs Tobacco Use Disorder     11/14/18 I Advocated Smoking Cessation And RXd     Anticoagulant long-term use     Aortic valve stenosis     Borderline Vitamin D Deficiency     10/21/18 And 4/4/16 RXd OTC D3 2K Daily    Chronic Insomnia `    On Restoril 30 Mg qHS PRN, Trazodone 100 Mg qHS Caused Dizziness And Headaches    Chronic Recurrent Left Foreleg Pain     Depression     12/21/16 Decreased Wellbutrin Further And Increased Zoloft To 100 Mg Daily; 11/8/16 Decreased Wellbutrin-XL To 300  "Mg qAM And RXd Zoloft 50 Mg qAM; She Sees A Therapist For This; Lexapro Was Innefective    Diabetes mellitus     prediabetic    Diverticulosis Of Sigmoid, Decending, Transverse, and Ascending Colon; W/O Diverticulitis On 9/24/18 EGD     Dr. KIRAN Heck    Fructose malabsorption     Gastroparesis     Dr. KIRAN Heck On 5/2/17 Continued Her Post Gastric Sleeve Diet    GERD With H/O Stricture Dilation     Dr. KIRAN Heck    H/O Abnormal Mammogram     F/U Mammograms And U/S Have Been Stable    H/O Abnormal Mammogram     F/U Mammograms And U/S Have Been Stable     H/O Adenomatous Colon Polyps     Dr. KIRAN Heck TC On 9/24/18 Rectal Polyps Only; "Repeat TC In 5 YRs"    H/O H. Pylori Gastritis 2009     Dr. KIRAN Heck; Received Triple Tx Then    H/O Nephrolithiasis     6/3/16 ABD/Pelvic CT W/W/O = 1 CM Right Renal Stone Only; 5/26/15 ABD/Pelvic CT With IV Contrast Only = Positive For A Nonobstructing Right Renal Stone    H/O of CABG 11/15/21     CABG X3 With [place]-Vein-Endovascular (Left)     H/O Recurrent Cyclical Nausea     Dr. KIRAN Heck    H/O Small Rectal Polyps On 9/24/18 TC; Nonbleeding, Biopsies Negative For Malignancy     Dr. Jonh Heck; "Repeat TC IN 5 YRs"    Hypercholesterolemia     On Livalo 4 Mg qHS    Hypertension     no longer taking medication since weight loss    Hypothyroidism     On [place] Thyroid    IBS (irritable bowel syndrome)     Medium Internal Hemorrhoids With Rectal Bleeding On 9/24/18 EGD     Dr. KIRAN Heck; Will Schedule Clinic Visit For Hemorrhoid Banding    Mitral valve prolapse     Nonalcoholic Steatohepatitis ([facility]     Dr. KIRAN Heck On 8/18/16 Ordered A Liver U/S And LFTs    Nonobstructive atherosclerosis of coronary artery     Obesity     Obesity S/P Sleeve Gastrectomy With Hiatal hernia Repair 10/2015     Dr. Scot Werner    Ocular Migraines     Osteoporosis     10/21/18 RXd Fosamax 70 Mg Weekly, And OTC " D3 2K IU Daily, And OTC CaCO3 600 Mg Daily    Partial tear of subscapularis tendon, left, subsequent encounter     08/2020    Pulmonary Nodules     Dr. Luis F Evans Will Repeat This In 1 Year; 8/7/18 Low Dose Chest CT = 1 Small Nodule In Each Lung    Rectal polyp     Renal disorder     nephrolithiasis    RLQ Pain Due To Epiploic Appendagitis 08/2016     Dr. KIRAN Heck TXd This With Augmentin And Naproxen After Cipro And Flagyl Failed    Rotator cuff syndrome of left shoulder 08/2020    Rotator cuff syndrome, left 08/10/2020    Rotator cuff syndrome, right 05/10/2021    RUQ Abdominal Pain 5/31/16     6/3/16 ABD/Pelvic CT W/W/O = 1 CM Right Renal Stone Only; 5/31/16 ABD XRays = Unremarkable; 5/31/16 Increased Her PPI To Bid X 1 Week     Past Surgical History:   Procedure Laterality Date    ANGIOGRAM, CORONARY, WITH LEFT HEART CATHETERIZATION N/A 11/22/2022    Procedure: ANGIOGRAM,CORONARY,WITH LEFT HEART CATHETERIZATION;  Surgeon: Zack Romero MD;  Location: [facility];  Service: Cardiology;  Laterality: N/A;    ANGIOGRAPHY OF LOWER EXTREMITY N/A 11/22/2022    Procedure: ANGIOGRAM, LOWER EXTREMITY;  Surgeon: Zack Romero MD;  Location: [facility] CATH;  Service: Cardiology;  Laterality: N/A;    ARTHROSCOPIC REPAIR OF ROTATOR CUFF OF SHOULDER Left 09/02/2020    Procedure: REPAIR, ROTATOR CUFF, ARTHROSCOPIC;  Surgeon: Clifford Kendrick II, MD;  Location: [facility] OR;  Service: Orthopedics;  Laterality: Left;    ARTHROSCOPY OF SHOULDER WITH DECOMPRESSION OF SUBACROMIAL SPACE Left 09/02/2020    Procedure: ARTHROSCOPY, SHOULDER, WITH SUBACROMIAL SPACE DECOMPRESSION;  Surgeon: Clifford Kendrick II, MD;  Location: [facility] OR;  Service: Orthopedics;  Laterality: Left;    BUNIONECTOMY      CARPAL TUNNEL RELEASE      CHOLECYSTECTOMY      COLONOSCOPY      september 2015    CORONARY ARTERY BYPASS GRAFT (CABG) N/A 11/15/2021    Procedure: CORONARY ARTERY BYPASS GRAFT (CABG) x 3;  Surgeon: Fabian Montana MD;  Location:  [facility] OR 2ND FLR;  Service: Cardiothoracic;  Laterality: N/A;    ENDOSCOPIC HARVEST OF VEIN Left 11/15/2021    Procedure: HARVEST-VEIN-ENDOVASCULAR;  Surgeon: Fabian Montana MD;  Location: [facility] OR 2ND FLR;  Service: Cardiothoracic;  Laterality: Left;  Vein harvest start: 1430  Vein harvest stop: 1504  Vein prep start: 1504  Vein prep stop: 1527    ESOPHAGOGASTRODUODENOSCOPY  09/15/2023    EXCISIONAL HEMORRHOIDECTOMY N/A 05/08/2019    Procedure: HEMORRHOIDECTOMY;  Surgeon: Jelani Leal MD;  Location: [facility] OR;  Service: General;  Laterality: N/A;    FIXATION OF TENDON Left 09/02/2020    Procedure: FIXATION, TENDON - open biceps tenodesis;  Surgeon: Clifford Kendrick II, MD;  Location: [facility] OR;  Service: Orthopedics;  Laterality: Left;    FLEXIBLE SIGMOIDOSCOPY Left 11/26/2018    Procedure: SIGMOIDOSCOPY, FLEXIBLE;  Surgeon: Anatoliy Simms MD;  Location: [facility];  Service: Endoscopy;  Laterality: Left;    GASTRECTOMY      gastric sleeve    gastric sleeve  10/2015    HERNIA REPAIR      HYSTERECTOMY      total @ age 34    LEFT HEART CATHETERIZATION Left 11/09/2021    Procedure: Left heart cath;  Surgeon: Samantha Vergara MD;  Location: [facility] CATH;  Service: Cardiology;  Laterality: Left;    OOPHORECTOMY      w/Hysterectomy @ age 34    TUBAL LIGATION       Family History   Problem Relation Name Age of Onset    Stomach cancer Mother      Heart disease Father      Hypertension Father      Hypothyroidism Father      Valvular heart disease Father      Mitral valve prolapse Sister      [place]'s esophagus Sister      Diabetes Maternal Grandmother      Schizophrenia Maternal Grandfather      Heart disease Paternal Grandfather      Hypothyroidism Daughter      Hyperlipidemia Daughter      Hypothyroidism Daughter      Hyperlipidemia Daughter      Colon cancer Paternal Aunt      Breast cancer Paternal Aunt  55    Cancer Paternal Aunt      Crohn's disease Neg Hx      Ulcerative colitis Neg Hx       Social History      Tobacco Use    Smoking status: Former     Current packs/day: 0.00     Average packs/day: 0.8 packs/day for 43.0 years (34.4 ttl pk-yrs)     Types: Cigarettes     Start date: 1977     Quit date: 2020     Years since quittin.1    Smokeless tobacco: Never    Tobacco comments:     quit 2018 restarted recently and quit 3 days ago   Substance Use Topics    Alcohol use: No     Alcohol/week: 0.0 standard drinks of alcohol    Drug use: Never     Wt Readings from Last 10 Encounters:   10/09/24 71.5 kg (157 lb 10.1 oz)   24 72.3 kg (159 lb 6.4 oz)   24 72.1 kg (158 lb 14.4 oz)   04/15/24 80.1 kg (176 lb 9.4 oz)   23 80.1 kg (176 lb 9.6 oz)   10/30/23 79.7 kg (175 lb 9.6 oz)   23 79.4 kg (175 lb)   23 87.8 kg (193 lb 8 oz)   22 86.2 kg (190 lb)   22 89.8 kg (198 lb)     Lab Results   Component Value Date    WBC 5.73 2024    HGB 12.4 2024    HCT 38.1 2024    MCV 89 2024     2024     CMP  Sodium   Date Value Ref Range Status   2024 140 136 - 145 mmol/L Final   10/27/2015 141 137 - 145 MMOL/L Final     Potassium   Date Value Ref Range Status   2024 4.4 3.5 - 5.1 mmol/L Final     Comment:     Anion Gap reference range revised on 2023   10/27/2015 3.8 3.5 - 5.1 MMOL/L Final     Chloride   Date Value Ref Range Status   2024 108 95 - 110 mmol/L Final   10/27/2015 105 98 - 107 MMOL/L Final     CO2   Date Value Ref Range Status   2024 30 22 - 31 mmol/L Final     Glucose   Date Value Ref Range Status   2024 96 70 - 110 mg/dL Final     Comment:     The ADA recommends the following guidelines for fasting glucose:    Normal:       less than 100 mg/dL    Prediabetes:  100 mg/dL to 125 mg/dL    Diabetes:     126 mg/dL or higher       BUN   Date Value Ref Range Status   2024 12 7 - 18 mg/dL Final     Creatinine   Date Value Ref Range Status   2024 0.84 0.50 - 1.40 mg/dL Final   10/27/2015 0.57 0.52  - 1.04 MG/DL Final     Calcium   Date Value Ref Range Status   07/23/2024 9.0 8.4 - 10.2 mg/dL Final     Total Protein   Date Value Ref Range Status   07/23/2024 5.7 (L) 6.0 - 8.4 g/dL Final     Albumin   Date Value Ref Range Status   07/23/2024 3.6 3.5 - 5.2 g/dL Final   10/20/2015 4.1 3.5 - 5.0 G/DL Final     Total Bilirubin   Date Value Ref Range Status   07/23/2024 0.9 0.2 - 1.3 mg/dL Final     Alkaline Phosphatase   Date Value Ref Range Status   07/23/2024 133 38 - 145 U/L Final     AST ([place])   Date Value Ref Range Status   11/16/2015 25 14 - 36 U/L Final     AST   Date Value Ref Range Status   07/23/2024 48 (H) 14 - 36 U/L Final     ALT   Date Value Ref Range Status   07/23/2024 64 (H) 0 - 35 U/L Final     Anion Gap   Date Value Ref Range Status   07/23/2024 2 (L) 5 - 12 mmol/L Final     Comment:     Anion Gap reference range revised on 4/28/2023     eGFR if    Date Value Ref Range Status   07/07/2022 >60 >60 mL/min/1.73 m^2 Final     eGFR if non    Date Value Ref Range Status   07/07/2022 >60 >60 mL/min/1.73 m^2 Final     Comment:     Calculation used to obtain the estimated glomerular filtration  rate (eGFR) is the CKD-EPI equation.        Lab Results   Component Value Date    AMYLASE 75 07/23/2024     Lab Results   Component Value Date    LIPASE 71 04/26/2017     Lab Results   Component Value Date    LIPASERES 129 07/23/2024     Lab Results   Component Value Date    TSH 1.020 07/23/2024     Lab Results   Component Value Date    HEPCAB Negative 03/22/2019     Reviewed prior medical records including radiology report of 9/13/2024 CT chest; 12/27/2021 CT abdomen pelvis; 2/5/2020 limited abdominal ultrasound; 12/24/2018 abdominal ultrasound; 11/18/2015 Upper and esophagram; & endoscopy history (see surgical history/procedures).    11/21/2023 visit note with [facility] reviewed in media; PAST TREATMENT: prevacid, pepcid.    Objective:      Physical Exam  Vitals and  nursing note reviewed.   Constitutional:       General: She is not in acute distress.     Appearance: Normal appearance. She is well-developed. She is not diaphoretic.   HENT:      Mouth/Throat:      Lips: Pink. No lesions.      Mouth: Mucous membranes are moist. No oral lesions.      Tongue: No lesions.      Pharynx: Oropharynx is clear. No pharyngeal swelling or posterior oropharyngeal erythema.   Eyes:      General: No scleral icterus.     Conjunctiva/sclera: Conjunctivae normal.   Pulmonary:      Effort: Pulmonary effort is normal. No respiratory distress.      Breath sounds: Normal breath sounds. No wheezing.   Abdominal:      General: Bowel sounds are normal. There is no distension or abdominal bruit.      Palpations: Abdomen is soft. Abdomen is not rigid. There is no mass.      Tenderness: There is no abdominal tenderness. There is no guarding or rebound. Negative signs include Gomez's sign and McBurney's sign.   Skin:     General: Skin is warm and dry.      Coloration: Skin is not jaundiced or pale.      Findings: No erythema or rash.   Neurological:      Mental Status: She is alert and oriented to person, place, and time.   Psychiatric:         Behavior: Behavior normal.         Thought Content: Thought content normal.         Judgment: Judgment normal.         Assessment:       1. Heartburn    2. Hiatal hernia    3. Epigastric pain    4. Nausea    5. S/P cholecystectomy    6. History of Helicobacter pylori infection    7. History of bariatric surgery    8. Pharyngoesophageal dysphagia    9. Odynophagia    10. Family history of stomach cancer    11. Elevated LFTs    12. CLEMENT (nonalcoholic steatohepatitis)    13. History of colon polyps    14. Chronic diarrhea    15. Anticoagulant long-term use        Plan:       Heartburn & Hiatal hernia  - DISCONTINUE PREVACID DUE TO ALTERNATE THERAPY  -  START   omeprazole (PRILOSEC) 40 MG capsule; Take 1 capsule (40 mg total) by mouth 2 (two) times daily before meals.   Dispense: 60 capsule; Refill: 1  - schedule EGD, discussed procedure with patient, including risks and benefits, patient verbalized understanding    Epigastric pain  -     US Abdomen Complete; Future; Expected date: 10/09/2024  -   START  omeprazole (PRILOSEC) 40 MG capsule; Take 1 capsule (40 mg total) by mouth 2 (two) times daily before meals.  Dispense: 60 capsule; Refill: 1  - schedule EGD, discussed procedure with patient, including risks and benefits, patient verbalized understanding  - avoid/minimize use of NSAIDs- since they can cause GI upset, bleeding and/or ulcers. If NSAID must be taken, recommend take with food.    Nausea  -     US Abdomen Complete; Future; Expected date: 10/09/2024  -     Hepatic Function Panel; Future; Expected date: 10/09/2024  -     Hepatitis Panel, Acute; Future; Expected date: 10/09/2024  -   START  omeprazole (PRILOSEC) 40 MG capsule; Take 1 capsule (40 mg total) by mouth 2 (two) times daily before meals.  Dispense: 60 capsule; Refill: 1  - CONTINUE ZOFRAN PRN AS DIRECTED FOR NAUSEA  - schedule EGD, discussed procedure with patient, including risks and benefits, patient verbalized understanding  - discussed with patient that certain medications, such as GLP1 medications, may be contributing to symptoms. I recommend follow-up with provider who manages medication to discuss about possible alternative therapy, patient verbalized understanding    S/P cholecystectomy  -     US Abdomen Complete; Future; Expected date: 10/09/2024  -     Hepatic Function Panel; Future; Expected date: 10/09/2024  -     Hepatitis Panel, Acute; Future; Expected date: 10/09/2024    History of Helicobacter pylori infection  - schedule EGD, discussed procedure with patient, including risks and benefits, patient verbalized understanding    History of bariatric surgery  - schedule EGD, discussed procedure with patient, including risks and benefits, patient verbalized understanding  - Recommend follow-up with  bariatric surgery for continued evaluation and management.    Pharyngoesophageal dysphagia & Odynophagia  - schedule EGD, discussed procedure with patient and possible esophageal dilation may be performed during procedure if indicated, patient verbalized understanding  - recommend to eat smaller more frequent meals and to eat slowly and advised to eat a soft diet. Take medications one at a time with a full glass of water.  - possible UGI/esophagram/esophageal manometry if symptoms persist    Family history of stomach cancer  - schedule EGD, discussed procedure with patient, including risks and benefits, patient verbalized understanding    Elevated LFTs & CLEMENT (nonalcoholic steatohepatitis)  -     Hepatic Function Panel; Future; Expected date: 10/09/2024  -     Hepatitis Panel, Acute; Future; Expected date: 10/09/2024  - recommend low fat, low cholesterol diet, maintain good control of blood sugars and cholesterol levels, exercise, weight loss (if overweight), minimize/avoid alcohol and tylenol products, & follow-up with PCP for continued evaluation and management; if specialist is needed, recommend seeing hepatology.    History of colon polyps  - schedule Colonoscopy, discussed procedure with the patient, including risks and benefits, patient verbalized understanding    Chronic diarrhea  -     Stool Exam-Ova,Cysts,Parasites; Future; Expected date: 10/09/2024  -     Giardia / Cryptosporidum, EIA; Future; Expected date: 10/09/2024  -     Stool culture; Future; Expected date: 10/09/2024  -     Clostridium difficile EIA; Future; Expected date: 10/09/2024  -     IgA; Future; Expected date: 10/09/2024  -     Tissue Transglutaminase, IgA; Future; Expected date: 10/09/2024  - schedule Colonoscopy, discussed procedure with the patient, including risks and benefits, patient verbalized understanding  - CONTINUE OTC probiotic taken as directed on packaging  - avoid lactose, alcohol, & caffeine  - avoid known triggers  -  discussed about possible treatment options, patient verbalized understanding but patient declined prescription at this time.    Anticoagulant long-term use  - anticoagulant(s) will likely need to be held for endoscopy, nurse will confirm with endoscopist, cardiologist, and/or PCP.    Follow up in about 1 month (around 11/9/2024), or if symptoms worsen or fail to improve.      If no improvement in symptoms or symptoms worsen, call/follow-up at clinic or go to ER.        42 minutes of total time spent on the encounter, which includes face to face time and non-face to face time preparing to see the patient (e.g., review of tests), Obtaining and/or reviewing separately obtained history, Documenting clinical information in the electronic or other health record, Independently interpreting results (not separately reported) and communicating results to the patient/family/caregiver, or Care coordination (not separately reported).

## 2024-10-09 NOTE — PATIENT INSTRUCTIONS
Acid Reflux and GERD in Adults Discharge Instructions   About this topic   GERD stands for gastroesophageal reflux disease. It is sometimes called reflux or acid reflux. Acid reflux happens when your stomach acid backs up into your esophagus, the tube that carries your food from your mouth to your stomach. This can be uncomfortable. You may have stomach or chest pain (heartburn), trouble swallowing, or an upset stomach. Some people have a cough or sore throat.  Most of the time, you can use over-the-counter medicines to help with this problem.       What care is needed at home?   Ask your doctor what you need to do when you go home. Make sure you ask questions if you do not understand what the doctor says.  Raise the head of your bed by 6 to 8 inches (15 to 20 cm). Use wood or rubber blocks under 2 legs or try a foam wedge under your mattress. Just sleeping with your head raised on pillows is not enough.  Avoid beer, wine, and mixed drinks and avoid caffeine.  Keep a healthy weight. If you are too heavy, lose weight.  If you smoke, try to quit. Your doctor or nurse can help.  Keep a diary of your signs. Write down what you had to eat before you had reflux. This will help you learn which foods cause you problems. For some people, they need to avoid coffee, chocolate, alcohol, spicy or fatty foods, or peppermint.  Avoid eating for 2 to 3 hours before bedtime. Lying down after you eat can make reflux worse.  Avoid belts and clothes that are too tight.  What follow-up care is needed?   Your doctor may ask you to make visits to the office to check on your progress. Be sure to keep these visits.  What drugs may be needed?   The doctor may order drugs to:  Relieve heartburn  Prevent reflux  Lessen acid production  Heal the esophageal lining  Will physical activity be limited?   Your physical activities will not be limited.  What problems could happen?   Asthma  Precancerous changes in the food pipe  Long-term cough  Dental  problems  Higher risk of cancer of the food pipe. This is esophageal cancer.  Narrowing of the food pipe. This is a stricture.  Open sore in the food pipe. This is an ulcer.  When do I need to call the doctor?   You have signs of a heart attack, which may include:  Severe chest pain, pressure, or discomfort with:  Breathing trouble, sweating, upset stomach, or cold, clammy skin.  Pain in your arms, back, or jaw.  Worse pain with activity like walking up stairs.  Fast or irregular heartbeat.  Feeling dizzy, faint, or weak.  You have sudden, severe belly pain or the belly pain is constant.  You have blood in the undigested food and acid that comes up, or stool that looks red, black, or like tar.  You feel like your food gets stuck or you have pain when you swallow.  You lose weight when you are not trying to.  You choke when you are eating.  Your reflux is very bad, very frequent, or not helped by over-the-counter medicines.  You keep throwing up.  Teach Back: Helping You Understand   The Teach Back Method helps you understand the information we are giving you. After you talk with the staff, tell them in your own words what you learned. This helps to make sure the staff has described each thing clearly. It also helps to explain things that may have been confusing. Before going home, make sure you can do these:  I can tell you about my condition.  I can tell you what changes I need to make with my eating habits to ease the reflux.  I can tell you what I will do if I am throwing up fluid that looks like blood or coffee grounds.  Where can I learn more?   American Academy of [employer]icians  https://familydoctor.org/condition/refluxacid-reflux/   [facility] Choices  https://www.nhs.uk/conditions/heartburn-and-acid-reflux/   Last Reviewed Date   2021-06-09  Consumer Information Use and Disclaimer   This information is not specific medical advice and does not replace information you receive from your health care provider. This  is only a brief summary of general information. It does NOT include all information about conditions, illnesses, injuries, tests, procedures, treatments, therapies, discharge instructions or life-style choices that may apply to you. You must talk with your health care provider for complete information about your health and treatment options. This information should not be used to decide whether or not to accept your health care providers advice, instructions or recommendations. Only your health care provider has the knowledge and training to provide advice that is right for you.  Copyright   Copyright © 2021 UpToDate, Inc. and its affiliates and/or licensors. All rights reserved. Hiatal Hernia   The Basics   Written by the doctors and editors at [employer]   What is a hiatal hernia? -- A hiatal hernia is what doctors call it when a part of the stomach moves up into the chest area. Normally, the stomach sits below the diaphragm, the layer of muscle that separates the organs in the chest from the organs in the belly. The esophagus, the tube that carries food from the mouth to the stomach, passes through a hole in the diaphragm. In people with a hiatal hernia, the stomach pushes up through that hole, too.  There are 2 types of hiatal hernia (figure 1):  Sliding hernia - A sliding hernia happens when the top of the stomach and the lower part of the esophagus squeeze up into the space above the diaphragm. This is the most common type of hiatal hernia.  Paraesophageal hernia - A paraesophageal hernia happens when the top of the stomach squeezes up into the space above the diaphragm. This is not very common, but it can be serious if the stomach folds up on itself. It can also cause bleeding from the stomach or trouble breathing.  What are the symptoms of a hiatal hernia? -- Hiatal hernias do not usually cause symptoms. In some cases, though, hiatal hernias cause stomach acid to leak into the esophagus. This is called acid  reflux or gastroesophageal reflux, and it can cause symptoms, including:  Burning in the chest, known as heartburn  Burning in the throat or an acid taste in the throat  Stomach or chest pain  Trouble swallowing  A raspy voice or a sore throat  Unexplained cough  Is there a test for hiatal hernia? -- Yes, but doctors do not usually test for hiatal hernia. Instead, most people learn they have a hiatal hernia when they are having tests to find the cause of symptoms, or for other reasons. For instance, some people find out they have a hiatal hernia when they have an X-ray. Others find out when their doctor puts a tube with a tiny camera down their throat (called an endoscopy) (figure 2).  How are hiatal hernias treated? -- People who have symptoms caused by a hiatal hernia can get treated for their symptoms.  Treatment for symptoms involves taking the medicines that are used for acid reflux (table 1). People with a paraesophageal hernia, and some people with a sliding hernia, need surgery. For this surgery, the surgeon pulls the stomach back down and repairs the hole in the diaphragm so the stomach does not slide up again.  All topics are updated as new evidence becomes available and our peer review process is complete.  This topic retrieved from [employer] on: Sep 21, 2021.  Topic 45689 Version 8.0  Release: 29.4.2 - C29.263  © 2021 UpToDate, Inc. and/or its affiliates. All rights reserved.  figure 1: Hiatal hernia     A sliding hernia happens when the top of the stomach squeezes up into the space above the diaphragm. This is the most common type of hiatal hernia.  A paraesophageal hernia happens when the top of the stomach folds up against the esophagus, creating a pouch. This is not very common, but it can be serious.  Graphic 42110 Version 4.0    figure 2: Upper endoscopy     During an upper endoscopy, you lie down and the doctor puts a thin tube with a camera and light on the end (called an endoscope) into your  mouth and down into your esophagus, stomach, and duodenum (the first part of your small intestine). The camera sends pictures from inside your body to a television screen. That way, your doctor can see the inside of your esophagus, stomach, and duodenum.  Graphic 61801 Version 4.0    table 1: Medicines used to reduce stomach acid  Medicine type  Medicine name examples    Antacids* Calcium carbonate (sample brand names: Maalox, Tums)    Aluminum hydroxide, magnesium hydroxide, and simethicone (sample brand name: Mylanta)   Surface agents Sucralfate (brand name: Carafate)   Histamine blockers¶  Famotidine (brand name: Pepcid)    Cimetidine (brand name: Tagamet)   Proton pump inhibitors Omeprazole (brand name: Prilosec)    Esomeprazole (brand name: Nexium)    Pantoprazole (brand name: Protonix)    Lansoprazole (brand name: Prevacid)    Dexlansoprazole (brand name: Dexilant)    Rabeprazole (brand name: AcipHex)   Graphic 56328 Version 14.0  Consumer Information Use and Disclaimer   This information is not specific medical advice and does not replace information you receive from your health care provider. This is only a brief summary of general information. It does NOT include all information about conditions, illnesses, injuries, tests, procedures, treatments, therapies, discharge instructions or life-style choices that may apply to you. You must talk with your health care provider for complete information about your health and treatment options. This information should not be used to decide whether or not to accept your health care provider's advice, instructions or recommendations. Only your health care provider has the knowledge and training to provide advice that is right for you. The use of this information is governed by the [employer] End User License Agreement, available at https://www.[email or web address]/solutions/[employer]/about/fili.The use of UpToDate content is governed by the [employer] Terms of Use. ©2021  [employer] All rights reserved.  Copyright   © 2021 [employer] and/or its affiliates. All rights reserved.

## 2024-10-10 ENCOUNTER — PATIENT MESSAGE (OUTPATIENT)
Dept: GASTROENTEROLOGY | Facility: CLINIC | Age: 64
End: 2024-10-10
Payer: COMMERCIAL

## 2024-10-11 ENCOUNTER — HOSPITAL ENCOUNTER (OUTPATIENT)
Dept: RADIOLOGY | Facility: HOSPITAL | Age: 64
Discharge: HOME | End: 2024-10-11
Attending: NURSE PRACTITIONER
Payer: COMMERCIAL

## 2024-10-11 ENCOUNTER — PATIENT MESSAGE (OUTPATIENT)
Dept: GASTROENTEROLOGY | Facility: CLINIC | Age: 64
End: 2024-10-11
Payer: COMMERCIAL

## 2024-10-11 DIAGNOSIS — Z90.49 S/P CHOLECYSTECTOMY: ICD-10-CM

## 2024-10-11 DIAGNOSIS — R10.13 EPIGASTRIC PAIN: ICD-10-CM

## 2024-10-11 DIAGNOSIS — R11.0 NAUSEA: ICD-10-CM

## 2024-10-11 PROCEDURE — 76700 US EXAM ABDOM COMPLETE: CPT | Mod: TC,PO

## 2024-10-11 PROCEDURE — 76700 US EXAM ABDOM COMPLETE: CPT | Mod: 26,,, | Performed by: RADIOLOGY

## 2024-10-15 ENCOUNTER — LAB VISIT (OUTPATIENT)
Dept: LAB | Facility: HOSPITAL | Age: 64
End: 2024-10-15
Attending: NURSE PRACTITIONER
Payer: COMMERCIAL

## 2024-10-15 DIAGNOSIS — K52.9 CHRONIC DIARRHEA: ICD-10-CM

## 2024-10-15 PROCEDURE — 87045 FECES CULTURE AEROBIC BACT: CPT | Performed by: NURSE PRACTITIONER

## 2024-10-15 PROCEDURE — 87449 NOS EACH ORGANISM AG IA: CPT | Mod: 91 | Performed by: NURSE PRACTITIONER

## 2024-10-15 PROCEDURE — 87046 STOOL CULTR AEROBIC BACT EA: CPT | Performed by: NURSE PRACTITIONER

## 2024-10-15 PROCEDURE — 87449 NOS EACH ORGANISM AG IA: CPT | Performed by: NURSE PRACTITIONER

## 2024-10-15 PROCEDURE — 87329 GIARDIA AG IA: CPT | Performed by: NURSE PRACTITIONER

## 2024-10-15 PROCEDURE — 87209 SMEAR COMPLEX STAIN: CPT | Performed by: NURSE PRACTITIONER

## 2024-10-15 PROCEDURE — 87427 SHIGA-LIKE TOXIN AG IA: CPT | Performed by: NURSE PRACTITIONER

## 2024-10-16 DIAGNOSIS — R17 ELEVATED BILIRUBIN: Primary | ICD-10-CM

## 2024-10-16 LAB
C DIFF GDH STL QL: NEGATIVE
C DIFF TOX A+B STL QL IA: NEGATIVE
CRYPTOSP AG STL QL IA: NEGATIVE
G LAMBLIA AG STL QL IA: NEGATIVE

## 2024-10-17 LAB
BACTERIA STL CULT: NORMAL
E COLI SXT1 STL QL IA: NEGATIVE
E COLI SXT2 STL QL IA: NEGATIVE

## 2024-10-22 LAB — O+P STL MICRO: NORMAL

## 2024-10-28 ENCOUNTER — PATIENT MESSAGE (OUTPATIENT)
Dept: OTOLARYNGOLOGY | Facility: CLINIC | Age: 64
End: 2024-10-28
Payer: COMMERCIAL

## 2024-10-30 ENCOUNTER — TELEPHONE (OUTPATIENT)
Dept: GASTROENTEROLOGY | Facility: CLINIC | Age: 64
End: 2024-10-30
Payer: COMMERCIAL

## 2024-11-04 ENCOUNTER — LAB VISIT (OUTPATIENT)
Dept: LAB | Facility: HOSPITAL | Age: 64
End: 2024-11-04
Attending: OTOLARYNGOLOGY
Payer: COMMERCIAL

## 2024-11-04 ENCOUNTER — OFFICE VISIT (OUTPATIENT)
Dept: OTOLARYNGOLOGY | Facility: CLINIC | Age: 64
End: 2024-11-04
Payer: COMMERCIAL

## 2024-11-04 VITALS — HEIGHT: 63 IN | BODY MASS INDEX: 27.73 KG/M2 | WEIGHT: 156.5 LBS

## 2024-11-04 DIAGNOSIS — J32.9 RECURRENT SINUSITIS: ICD-10-CM

## 2024-11-04 LAB
IGA SERPL-MCNC: 65 MG/DL (ref 40–350)
IGE SERPL-ACNC: <35 IU/ML (ref 0–100)
IGG SERPL-MCNC: 418 MG/DL (ref 650–1600)
IGM SERPL-MCNC: 62 MG/DL (ref 50–300)

## 2024-11-04 PROCEDURE — 3066F NEPHROPATHY DOC TX: CPT | Mod: CPTII,S$GLB,, | Performed by: OTOLARYNGOLOGY

## 2024-11-04 PROCEDURE — 82784 ASSAY IGA/IGD/IGG/IGM EACH: CPT | Mod: 59 | Performed by: OTOLARYNGOLOGY

## 2024-11-04 PROCEDURE — 82784 ASSAY IGA/IGD/IGG/IGM EACH: CPT | Performed by: OTOLARYNGOLOGY

## 2024-11-04 PROCEDURE — 1159F MED LIST DOCD IN RCRD: CPT | Mod: CPTII,S$GLB,, | Performed by: OTOLARYNGOLOGY

## 2024-11-04 PROCEDURE — 99204 OFFICE O/P NEW MOD 45 MIN: CPT | Mod: S$GLB,,, | Performed by: OTOLARYNGOLOGY

## 2024-11-04 PROCEDURE — 4010F ACE/ARB THERAPY RXD/TAKEN: CPT | Mod: CPTII,S$GLB,, | Performed by: OTOLARYNGOLOGY

## 2024-11-04 PROCEDURE — 3044F HG A1C LEVEL LT 7.0%: CPT | Mod: CPTII,S$GLB,, | Performed by: OTOLARYNGOLOGY

## 2024-11-04 PROCEDURE — 3008F BODY MASS INDEX DOCD: CPT | Mod: CPTII,S$GLB,, | Performed by: OTOLARYNGOLOGY

## 2024-11-04 PROCEDURE — 82785 ASSAY OF IGE: CPT | Performed by: OTOLARYNGOLOGY

## 2024-11-04 PROCEDURE — 86317 IMMUNOASSAY INFECTIOUS AGENT: CPT | Performed by: OTOLARYNGOLOGY

## 2024-11-04 PROCEDURE — 82787 IGG 1 2 3 OR 4 EACH: CPT | Mod: 59 | Performed by: OTOLARYNGOLOGY

## 2024-11-04 PROCEDURE — 1160F RVW MEDS BY RX/DR IN RCRD: CPT | Mod: CPTII,S$GLB,, | Performed by: OTOLARYNGOLOGY

## 2024-11-04 PROCEDURE — 99999 PR PBB SHADOW E&M-EST. PATIENT-LVL V: CPT | Mod: PBBFAC,,, | Performed by: OTOLARYNGOLOGY

## 2024-11-04 RX ORDER — FLUTICASONE PROPIONATE 50 MCG
2 SPRAY, SUSPENSION (ML) NASAL DAILY
Qty: 16 G | Refills: 11 | Status: SHIPPED | OUTPATIENT
Start: 2024-11-04

## 2024-11-04 NOTE — PROGRESS NOTES
Subjective:       Patient ID: Chloe Obrien is a 63 y.o. female.    Chief Complaint: Sinus Problem    Chloe is here for sinus/nasal complaints.  She had Covid in  of this yr, and since that time she has been having recurrent sinus infections. She has had 4 rounds of antibiotics and steroids during this time. Prior to that it wasn't occurring as frequently.   Symptoms include headache, discolored nasal drainage, alternating nasal congestion / pressure. She denies sense of smell change.     She does have chronic allergic rhinitis.  Therapies tried: Singulair, Zyrtec, Astelin.  She had Pneumococcal vaccine               Social History     Tobacco Use   Smoking Status Former    Current packs/day: 0.00    Average packs/day: 0.8 packs/day for 43.0 years (34.4 ttl pk-yrs)    Types: Cigarettes    Start date: 1977    Quit date: 2020    Years since quittin.1   Smokeless Tobacco Never   Tobacco Comments    quit 2018 restarted recently and quit 3 days ago     Social History     Substance and Sexual Activity   Alcohol Use No    Alcohol/week: 0.0 standard drinks of alcohol        Objective:        Constitutional:   She is oriented to person, place, and time. She appears well-developed and well-nourished. She appears alert. She is active. Normal speech.      Head:  Normocephalic and atraumatic. Head is without TMJ tenderness. No scars. Salivary glands normal.  Facial strength is normal.      Ears:    Right Ear: No drainage or swelling. No middle ear effusion.   Left Ear: No drainage or swelling.  No middle ear effusion.     Nose:  Septal deviation (mild to mod L inferior) present. No mucosal edema, rhinorrhea or sinus tenderness. No turbinate hypertrophy.      Mouth/Throat  Oropharynx clear and moist without lesions or asymmetry, normal uvula midline and mirror exam normal. Normal dentition. No uvula swelling, lacerations or trismus. No oropharyngeal exudate. Tonsillar erythema, tonsillar exudate.       Neck:  Full range of motion with neck supple and no adenopathy. Thyroid tenderness is present. No tracheal deviation, no edema, no erythema, normal range of motion, no stridor, no crepitus and no neck rigidity present. No thyroid mass present.     Cardiovascular:    Intact distal pulses and normal pulses.              Pulmonary/Chest:   Effort normal and breath sounds normal. No stridor.     Psychiatric:   Her speech is normal and behavior is normal. Her mood appears not anxious. Her affect is not labile.     Neurological:   She is alert and oriented to person, place, and time. No sensory deficit.     Skin:   No abrasions, lacerations, lesions, or rashes. No abrasion and no bruising noted.         Tests / Results:  none    Assessment:       1. Recurrent sinusitis          Plan:       We discussed recurrent sinusitis - discussed different causes  Add flonase to astelin  Immune panel / S. pneumo  CT Sinus if feeling an infection to see if anatomic issues- she will message on short notice for me to order

## 2024-11-04 NOTE — PATIENT INSTRUCTIONS
Nasal Steroid Spray    You have been prescribed or instructed to take a nasal steroid spray. Examples of this medication include Flonase (fluticasone), Nasacort (triamcinolone), and Rhinocort (budeosnide). Some symptoms will experience relief within 1-2 days; however, it may take other side effects 2-3 weeks to begin to see improvement. This medication needs to be taken consistently to see results.    Use as directed, spraying 1-2 times in each nostril per day.     Helpful hints for maximizing medication into the nose  - Use the opposite hand to spray the nostril (example: right hand for left nostril). This will help avoid spraying the medication onto the septum (the area that divides the left and right nasal cavity.)  - Tilt the bottle so that it is facing at a slight angle up or straight back, but avoid pointing the bottle straight up while spraying.   - Sniff in while you are spraying.    Side effects:  Overall, this is a well tolerated medication with low side effects. The benefit of nasal steroids as opposed to oral steroids is that the nasal steroid works primarily in the nose.  Common side effects: headache, nasal dryness, minor nose bleeds,   Rare side effects: septal perforation, elevated eye pressures, dry eyes, change in smell, allergic reaction.  Notify your doctor if you have any concerns or experience these symptoms.

## 2024-11-05 RX ORDER — BUSPIRONE HYDROCHLORIDE 15 MG/1
15 TABLET ORAL 2 TIMES DAILY
Qty: 180 TABLET | Refills: 0 | Status: SHIPPED | OUTPATIENT
Start: 2024-11-05

## 2024-11-05 RX ORDER — TRAZODONE HYDROCHLORIDE 150 MG/1
150 TABLET ORAL NIGHTLY
Qty: 90 TABLET | Refills: 0 | Status: SHIPPED | OUTPATIENT
Start: 2024-11-05

## 2024-11-05 NOTE — TELEPHONE ENCOUNTER
Trazodone   Last ordered: 2 months ago (8/15/2024) by Susu Desai NP     Last refill: 10/27/2024       Buspar 15mg      Last ordered: 4 months ago (7/2/2024) by Susu Desai NP     Last refill: 11/27/2023       Nov none   Lov 07/2024

## 2024-11-06 ENCOUNTER — TELEPHONE (OUTPATIENT)
Dept: OTOLARYNGOLOGY | Facility: CLINIC | Age: 64
End: 2024-11-06
Payer: COMMERCIAL

## 2024-11-06 NOTE — TELEPHONE ENCOUNTER
----- Message from Crispin Mooney MD sent at 11/6/2024  4:05 PM CST -----  So far the labs are ok. Small drop in total IgG, but not significant enough to consider it abnormal .

## 2024-11-07 LAB
IGG1 SER-MCNC: 216 MG/DL (ref 382–929)
IGG2 SER-MCNC: 118 MG/DL (ref 242–700)
IGG3 SER-MCNC: 61 MG/DL (ref 22–176)
IGG4 SER-MCNC: 21 MG/DL (ref 4–86)

## 2024-11-14 DIAGNOSIS — R76.8 WEAK ANTIBODY RESPONSE TO PNEUMOCOCCAL VACCINE: Primary | ICD-10-CM

## 2024-11-15 ENCOUNTER — TELEPHONE (OUTPATIENT)
Dept: OTOLARYNGOLOGY | Facility: CLINIC | Age: 64
End: 2024-11-15
Payer: COMMERCIAL

## 2024-11-15 NOTE — TELEPHONE ENCOUNTER
----- Message from Crispin Mooney MD sent at 11/14/2024  1:23 PM CST -----  Chloe, your protection to Strep pneumonia is low. This bacteria is the most common cause of sinus and ear infections. As infants, we are immunized against this bacteria to protect us from infection, but we are finding that some adults lose immunity over time. This could be contributing to increased infections. I recommend getting the Pneumovax booster and rechecking titers in 8 weeks. This can usually be given at the PCP or pharmacy, depending on availability. I will place the order for shot and recheck. Call the PCP office to see if it can be given - they can call our office with any questions.    Crispin Mooney MD  Otolaryngology - Head and Neck Surgery  Office: [phone]  Cell: [phone]  Fax: [phone]    This message was generated using voice dictation. Please excuse any errors that may have been created by the transcription software.

## 2024-11-25 ENCOUNTER — TELEPHONE (OUTPATIENT)
Dept: GASTROENTEROLOGY | Facility: CLINIC | Age: 64
End: 2024-11-25
Payer: COMMERCIAL

## 2024-11-25 NOTE — TELEPHONE ENCOUNTER
Spoke with pt. Pt states she has not yet had any BMs after taking 4 ducolax & 1 bottle of mag citrate. Pt informed to continue prepping for c-scope tomorrow & to drink a 3rd bottle of magnesium citrate at 10 pm if she is not clear. Pt verbalized understanding to all.

## 2024-11-25 NOTE — TELEPHONE ENCOUNTER
----- Message from Juan sent at 11/25/2024  1:09 PM CST -----  Contact: self  Type: Needs Medical Advice  Who Called:  Patient    Best Call Back Number: [phone]    Additional Information: Pt states she would like to speak with office regarding the prep also states she only had 1 bowel movement want to know if she need to do anything else.Please call back and advise

## 2024-12-05 ENCOUNTER — OFFICE VISIT (OUTPATIENT)
Dept: OBSTETRICS AND GYNECOLOGY | Facility: CLINIC | Age: 64
End: 2024-12-05
Payer: COMMERCIAL

## 2024-12-05 VITALS
DIASTOLIC BLOOD PRESSURE: 70 MMHG | SYSTOLIC BLOOD PRESSURE: 114 MMHG | HEIGHT: 63 IN | BODY MASS INDEX: 26.99 KG/M2 | WEIGHT: 152.31 LBS

## 2024-12-05 DIAGNOSIS — N95.2 VAGINAL ATROPHY: Primary | ICD-10-CM

## 2024-12-05 DIAGNOSIS — N93.9 VAGINAL BLEEDING: ICD-10-CM

## 2024-12-05 PROCEDURE — 99204 OFFICE O/P NEW MOD 45 MIN: CPT | Mod: S$GLB,,, | Performed by: STUDENT IN AN ORGANIZED HEALTH CARE EDUCATION/TRAINING PROGRAM

## 2024-12-05 PROCEDURE — 3078F DIAST BP <80 MM HG: CPT | Mod: CPTII,S$GLB,, | Performed by: STUDENT IN AN ORGANIZED HEALTH CARE EDUCATION/TRAINING PROGRAM

## 2024-12-05 PROCEDURE — 4010F ACE/ARB THERAPY RXD/TAKEN: CPT | Mod: CPTII,S$GLB,, | Performed by: STUDENT IN AN ORGANIZED HEALTH CARE EDUCATION/TRAINING PROGRAM

## 2024-12-05 PROCEDURE — 1159F MED LIST DOCD IN RCRD: CPT | Mod: CPTII,S$GLB,, | Performed by: STUDENT IN AN ORGANIZED HEALTH CARE EDUCATION/TRAINING PROGRAM

## 2024-12-05 PROCEDURE — 99999 PR PBB SHADOW E&M-EST. PATIENT-LVL V: CPT | Mod: PBBFAC,,, | Performed by: STUDENT IN AN ORGANIZED HEALTH CARE EDUCATION/TRAINING PROGRAM

## 2024-12-05 PROCEDURE — 3066F NEPHROPATHY DOC TX: CPT | Mod: CPTII,S$GLB,, | Performed by: STUDENT IN AN ORGANIZED HEALTH CARE EDUCATION/TRAINING PROGRAM

## 2024-12-05 PROCEDURE — 3044F HG A1C LEVEL LT 7.0%: CPT | Mod: CPTII,S$GLB,, | Performed by: STUDENT IN AN ORGANIZED HEALTH CARE EDUCATION/TRAINING PROGRAM

## 2024-12-05 PROCEDURE — 3008F BODY MASS INDEX DOCD: CPT | Mod: CPTII,S$GLB,, | Performed by: STUDENT IN AN ORGANIZED HEALTH CARE EDUCATION/TRAINING PROGRAM

## 2024-12-05 PROCEDURE — 3074F SYST BP LT 130 MM HG: CPT | Mod: CPTII,S$GLB,, | Performed by: STUDENT IN AN ORGANIZED HEALTH CARE EDUCATION/TRAINING PROGRAM

## 2024-12-05 RX ORDER — PRASTERONE 6.5 MG/1
6.5 INSERT VAGINAL NIGHTLY
Qty: 28 EACH | Refills: 11 | Status: SHIPPED | OUTPATIENT
Start: 2024-12-05

## 2024-12-05 NOTE — PROGRESS NOTES
"History & Physical  Gynecology      SUBJECTIVE:     Chief Complaint: Establish Care (Painful intercourse with bleeding.)       History of Present Illness:    Here today for vaginal bleeding. Had intercourse and it was painful then noticed light pink bleeding. + vaginal dryness. Hx of hyst/BSO 30 years ago. No hx of abnormal pap prior per patient. Same partner, no new new partners.     Review of patient's allergies indicates:   Allergen Reactions    Keflex [cephalexin] Swelling     Other reaction(s): throat swelilng    Levofloxacin Diarrhea       Past Medical History:   Diagnosis Date    1 PPD X 20+ YRs Tobacco Use Disorder     11/14/18 I Advocated Smoking Cessation And RXd     Anticoagulant long-term use     Aortic valve stenosis     Borderline Vitamin D Deficiency     10/21/18 And 4/4/16 RXd OTC D3 2K Daily    Chronic Insomnia `    On Restoril 30 Mg qHS PRN, Trazodone 100 Mg qHS Caused Dizziness And Headaches    Chronic Recurrent Left Foreleg Pain     Depression     12/21/16 Decreased Wellbutrin Further And Increased Zoloft To 100 Mg Daily; 11/8/16 Decreased Wellbutrin-XL To 300 Mg qAM And RXd Zoloft 50 Mg qAM; She Sees A Therapist For This; Lexapro Was Innefective    Diabetes mellitus     prediabetic    Diverticulosis Of Sigmoid, Decending, Transverse, and Ascending Colon; W/O Diverticulitis On 9/24/18 EGD     Dr. KIRAN Heck    Fructose malabsorption     Gastroparesis     Dr. KIRAN Heck On 5/2/17 Continued Her Post Gastric Sleeve Diet    GERD With H/O Stricture Dilation     Dr. KIRAN Heck    H/O Abnormal Mammogram     F/U Mammograms And U/S Have Been Stable    H/O Abnormal Mammogram     F/U Mammograms And U/S Have Been Stable     H/O Adenomatous Colon Polyps     Dr. KIRAN Heck TC On 9/24/18 Rectal Polyps Only; "Repeat TC In 5 YRs"    H/O H. Pylori Gastritis 2009     Dr. KIRAN Heck; Received Triple Tx Then    H/O Nephrolithiasis     6/3/16 ABD/Pelvic CT W/W/O = 1 CM " "Right Renal Stone Only; 5/26/15 ABD/Pelvic CT With IV Contrast Only = Positive For A Nonobstructing Right Renal Stone    H/O of CABG 11/15/21     CABG X3 With [place]-Vein-Endovascular (Left)     H/O Recurrent Cyclical Nausea     Dr. KIRAN Heck    H/O Small Rectal Polyps On 9/24/18 TC; Nonbleeding, Biopsies Negative For Malignancy     Dr. Jonh Heck; "Repeat TC IN 5 YRs"    Hypercholesterolemia     On Livalo 4 Mg qHS    Hypertension     no longer taking medication since weight loss    Hypothyroidism     On [place] Thyroid    IBS (irritable bowel syndrome)     Medium Internal Hemorrhoids With Rectal Bleeding On 9/24/18 EGD     Dr. KIRAN Heck; Will Schedule Clinic Visit For Hemorrhoid Banding    Mitral valve prolapse     Nonalcoholic Steatohepatitis (N.A.S.H.)     Dr. KIRAN Heck On 8/18/16 Ordered A Liver U/S And LFTs    Nonobstructive atherosclerosis of coronary artery     Obesity     Obesity S/P Sleeve Gastrectomy With Hiatal hernia Repair 10/2015     Dr. Scot Werner    Ocular Migraines     Osteoporosis     10/21/18 RXd Fosamax 70 Mg Weekly, And OTC D3 2K IU Daily, And OTC CaCO3 600 Mg Daily    Partial tear of subscapularis tendon, left, subsequent encounter     08/2020    Pulmonary Nodules     Dr. Luis F Evans Will Repeat This In 1 Year; 8/7/18 Low Dose Chest CT = 1 Small Nodule In Each Lung    Rectal polyp     Renal disorder     nephrolithiasis    RLQ Pain Due To Epiploic Appendagitis 08/2016     Dr. KIRAN Heck TXd This With Augmentin And Naproxen After Cipro And Flagyl Failed    Rotator cuff syndrome of left shoulder 08/2020    Rotator cuff syndrome, left 08/10/2020    Rotator cuff syndrome, right 05/10/2021    RUQ Abdominal Pain 5/31/16     6/3/16 ABD/Pelvic CT W/W/O = 1 CM Right Renal Stone Only; 5/31/16 ABD XRays = Unremarkable; 5/31/16 Increased Her PPI To Bid X 1 Week     Past Surgical History:   Procedure Laterality Date    ANGIOGRAM, CORONARY, WITH LEFT HEART " CATHETERIZATION N/A 11/22/2022    Procedure: ANGIOGRAM,CORONARY,WITH LEFT HEART CATHETERIZATION;  Surgeon: Zack Romero MD;  Location: [facility] CATH;  Service: Cardiology;  Laterality: N/A;    ANGIOGRAPHY OF LOWER EXTREMITY N/A 11/22/2022    Procedure: ANGIOGRAM, LOWER EXTREMITY;  Surgeon: Zack Romero MD;  Location: [facility] CATH;  Service: Cardiology;  Laterality: N/A;    ARTHROSCOPIC REPAIR OF ROTATOR CUFF OF SHOULDER Left 09/02/2020    Procedure: REPAIR, ROTATOR CUFF, ARTHROSCOPIC;  Surgeon: Clifford Kendrick II, MD;  Location: [facility] OR;  Service: Orthopedics;  Laterality: Left;    ARTHROSCOPY OF SHOULDER WITH DECOMPRESSION OF SUBACROMIAL SPACE Left 09/02/2020    Procedure: ARTHROSCOPY, SHOULDER, WITH SUBACROMIAL SPACE DECOMPRESSION;  Surgeon: Clifford Kendrick II, MD;  Location: [facility] OR;  Service: Orthopedics;  Laterality: Left;    BUNIONECTOMY      CARPAL TUNNEL RELEASE      CHOLECYSTECTOMY      COLONOSCOPY      september 2015    COLONOSCOPY N/A 11/26/2024    Procedure: COLONOSCOPY;  Surgeon: Kevin Turpin MD;  Location: [facility];  Service: Gastroenterology;  Laterality: N/A;    CORONARY ARTERY BYPASS GRAFT (CABG) N/A 11/15/2021    Procedure: CORONARY ARTERY BYPASS GRAFT (CABG) x 3;  Surgeon: Fabian Montana MD;  Location: [facility] OR [address] [place] [address];  Service: Cardiothoracic;  Laterality: N/A;    ENDOSCOPIC HARVEST OF VEIN Left 11/15/2021    Procedure: HARVEST-VEIN-ENDOVASCULAR;  Surgeon: Fabian Montana MD;  Location: [facility] OR [address] [place] [address];  Service: Cardiothoracic;  Laterality: Left;  Vein harvest start: 1430  Vein harvest stop: 1504  Vein prep start: 1504  Vein prep stop: 1527    ESOPHAGOGASTRODUODENOSCOPY  09/15/2023    ESOPHAGOGASTRODUODENOSCOPY N/A 11/26/2024    Procedure: EGD (ESOPHAGOGASTRODUODENOSCOPY);  Surgeon: Kevin Turpin MD;  Location: [facility] ENDO;  Service: Gastroenterology;  Laterality: N/A;    EXCISIONAL HEMORRHOIDECTOMY N/A 05/08/2019    Procedure: HEMORRHOIDECTOMY;  Surgeon: Jelani  RADHA Leal MD;  Location: [facility] OR;  Service: General;  Laterality: N/A;    FIXATION OF TENDON Left 2020    Procedure: FIXATION, TENDON - open biceps tenodesis;  Surgeon: Clifford Kendrick II, MD;  Location: [facility] OR;  Service: Orthopedics;  Laterality: Left;    FLEXIBLE SIGMOIDOSCOPY Left 2018    Procedure: SIGMOIDOSCOPY, FLEXIBLE;  Surgeon: Anatoliy Simms MD;  Location: [facility] ENDO;  Service: Endoscopy;  Laterality: Left;    GASTRECTOMY      gastric sleeve    gastric sleeve  10/2015    HERNIA REPAIR      HYSTERECTOMY      total @ age 34    LEFT HEART CATHETERIZATION Left 2021    Procedure: Left heart cath;  Surgeon: Samantha Vergara MD;  Location: [facility] CATH;  Service: Cardiology;  Laterality: Left;    OOPHORECTOMY      w/Hysterectomy @ age 34    TUBAL LIGATION       OB History          3    Para   3    Term   3            AB        Living             SAB        IAB        Ectopic        Multiple        Live Births                   Family History   Problem Relation Name Age of Onset    Stomach cancer Mother      Heart disease Father      Hypertension Father      Hypothyroidism Father      Valvular heart disease Father      Mitral valve prolapse Sister      [place]'s esophagus Sister      Diabetes Maternal Grandmother      Schizophrenia Maternal Grandfather      Heart disease Paternal Grandfather      Hypothyroidism Daughter      Hyperlipidemia Daughter      Hypothyroidism Daughter      Hyperlipidemia Daughter      Colon cancer Paternal Aunt      Breast cancer Paternal Aunt  55    Cancer Paternal Aunt      Crohn's disease Neg Hx      Ulcerative colitis Neg Hx       Social History     Tobacco Use    Smoking status: Former     Current packs/day: 0.00     Average packs/day: 0.8 packs/day for 43.0 years (34.4 ttl pk-yrs)     Types: Cigarettes     Start date: 1977     Quit date: 2020     Years since quittin.2    Smokeless tobacco: Never    Tobacco comments:     quit 2018  restarted recently and quit 3 days ago   Substance Use Topics    Alcohol use: No     Alcohol/week: 0.0 standard drinks of alcohol    Drug use: Never       Current Outpatient Medications   Medication Sig    albuterol (PROAIR HFA) 90 mcg/actuation inhaler Inhale 2 puffs into the lungs every 6 (six) hours as needed for Wheezing or Shortness of Breath. Rescue    aspirin (ECOTRIN) 81 MG EC tablet Take 81 mg by mouth once daily.    atorvastatin (LIPITOR) 40 MG tablet TAKE 1 TABLET BY MOUTH EVERY DAY    azelastine (ASTELIN) 137 mcg (0.1 %) nasal spray 2 sprays (274 mcg total) by Nasal route 2 (two) times daily.    buPROPion (WELLBUTRIN) 75 MG tablet Take 1 tablet (75 mg total) by mouth every morning.    busPIRone (BUSPAR) 15 MG tablet TAKE 1 TABLET BY MOUTH 2 TIMES DAILY.    cetirizine (ZYRTEC) 10 MG tablet Take 10 mg by mouth once daily.    cyanocobalamin 1,000 mcg/mL injection Inject 1,000 mcg into the muscle every 30 days.    ergocalciferol (ERGOCALCIFEROL) 50,000 unit Cap TAKE 1 CAPSULE BY MOUTH ONE TIME PER WEEK    ezetimibe (ZETIA) 10 mg tablet Take 10 mg by mouth.    famotidine (PEPCID) 40 MG tablet Take 40 mg by mouth nightly as needed for Heartburn.    fluticasone propionate (FLONASE) 50 mcg/actuation nasal spray 2 sprays (100 mcg total) by Each Nostril route once daily.    fluticasone-umeclidin-vilanter (TRELEGY ELLIPTA) 100-62.5-25 mcg DsDv Inhale 1 puff into the lungs once daily.    hydrOXYzine pamoate (VISTARIL) 25 MG Cap TAKE 1 CAPSULE BY MOUTH THREE TIMES A DAY    Lactobacillus rhamnosus GG (CULTURELLE) 10 billion cell capsule Take 1 capsule by mouth 2 (two) times a day.    levothyroxine (SYNTHROID) 125 MCG tablet Take 125 mcg by mouth before breakfast. Pt takes once weekly    montelukast (SINGULAIR) 10 mg tablet Take 1 tablet (10 mg total) by mouth every evening.    MOUNJARO 10 mg/0.5 mL PnIj Inject into the skin.    multivit-min/ferrous fumarate (MULTI VITAMIN ORAL) Take by mouth.    nitroGLYCERIN  (NITROSTAT) 0.4 MG SL tablet     olmesartan (BENICAR) 20 MG tablet Take 20 mg by mouth 2 (two) times daily.    omeprazole (PRILOSEC) 40 MG capsule Take 1 capsule (40 mg total) by mouth 2 (two) times daily before meals.    ondansetron (ZOFRAN-ODT) 4 MG TbDL DISSOLVE 1 TABLET BY MOUTH EVERY 6 HOURS AS NEEDED FOR NAUSEA AND VOMITING    OZEMPIC 2 mg/dose (8 mg/3 mL) PnIj Inject 2 mg into the skin once a week.    propranoloL (INDERAL) 20 MG tablet TAKE 1 TABLET BY MOUTH TWICE A DAY    rivaroxaban (XARELTO) 2.5 mg Tab Take 2.5 mg by mouth 2 (two) times daily.    traZODone (DESYREL) 150 MG tablet TAKE 1 TABLET BY MOUTH EVERY EVENING.    triamterene-hydrochlorothiazide 37.5-25 mg (DYAZIDE) 37.5-25 mg per capsule Take 1 capsule by mouth once daily.    ubidecarenone (CO Q-10 ORAL) Take by mouth.    UNABLE TO FIND UNITHYROID 125 mcg-pt takes 6 times weekly    vilazodone (VIIBRYD) 40 mg Tab tablet Take 1 tablet (40 mg total) by mouth once daily.    vitamin D (VITAMIN D3) 1000 units Tab Take 1,000 Units by mouth once daily.    acetaminophen (TYLENOL) 500 MG tablet Take 2 tablets (1,000 mg total) by mouth every 6 (six) hours as needed for Pain. (Patient not taking: Reported on 12/5/2024)    prasterone, dhea, (INTRAROSA) 6.5 mg Inst Place 6.5 mg vaginally every evening.     No current facility-administered medications for this visit.         Review of Systems:  Review of Systems   Constitutional:  Negative for chills, fatigue and fever.   HENT:  Negative for congestion.    Eyes:  Negative for visual disturbance.   Respiratory:  Negative for cough and shortness of breath.    Cardiovascular:  Negative for chest pain and palpitations.   Gastrointestinal:  Negative for abdominal distention, abdominal pain, constipation, diarrhea, nausea and vomiting.   Genitourinary:  Negative for difficulty urinating, dysuria, hematuria, vaginal bleeding and vaginal discharge.   Skin:  Negative for rash.   Neurological:  Negative for dizziness,  seizures, light-headedness and headaches.   Hematological:  Does not bruise/bleed easily.   Psychiatric/Behavioral:  Negative for dysphoric mood. The patient is not nervous/anxious.         OBJECTIVE:     Physical Exam:  Physical Exam  Vitals reviewed.   Constitutional:       General: She is not in acute distress.     Appearance: Normal appearance. She is well-developed.   HENT:      Head: Normocephalic and atraumatic.   Cardiovascular:      Rate and Rhythm: Normal rate and regular rhythm.   Pulmonary:      Effort: Pulmonary effort is normal.   Abdominal:      General: There is no distension.      Palpations: Abdomen is soft.   Genitourinary:     Vagina: Normal.      Comments: Cuff intact, no lesions noted. ++ vaginal atrophy   Skin:     General: Skin is warm.   Neurological:      Mental Status: She is alert and oriented to person, place, and time.   Psychiatric:         Behavior: Behavior normal.         Thought Content: Thought content normal.         Judgment: Judgment normal.           ASSESSMENT:       ICD-10-CM ICD-9-CM    1. Vaginal atrophy  N95.2 627.3 prasterone, dhea, (INTRAROSA) 6.5 mg Inst      2. Vaginal bleeding  N93.9 623.8 prasterone, dhea, (INTRAROSA) 6.5 mg Inst      Liquid-Based Pap Smear, Screening          No orders of the defined types were placed in this encounter.          Plan:   - atrophy noted  - pap done  - intrarosa    Virtual f/u in 3 month s

## 2025-01-24 RX ORDER — BUPROPION HYDROCHLORIDE 75 MG/1
75 TABLET ORAL EVERY MORNING
Qty: 90 TABLET | Refills: 1 | Status: SHIPPED | OUTPATIENT
Start: 2025-01-24

## 2025-01-30 ENCOUNTER — OFFICE VISIT (OUTPATIENT)
Dept: NEUROSURGERY | Facility: CLINIC | Age: 65
End: 2025-01-30
Payer: COMMERCIAL

## 2025-01-30 VITALS
SYSTOLIC BLOOD PRESSURE: 110 MMHG | BODY MASS INDEX: 26.41 KG/M2 | WEIGHT: 149.06 LBS | RESPIRATION RATE: 18 BRPM | HEIGHT: 63 IN | HEART RATE: 69 BPM | DIASTOLIC BLOOD PRESSURE: 66 MMHG

## 2025-01-30 DIAGNOSIS — M54.2 CERVICALGIA: ICD-10-CM

## 2025-01-30 DIAGNOSIS — M54.16 LUMBAR RADICULOPATHY: Primary | ICD-10-CM

## 2025-01-30 PROCEDURE — 3008F BODY MASS INDEX DOCD: CPT | Mod: CPTII,S$GLB,, | Performed by: PHYSICIAN ASSISTANT

## 2025-01-30 PROCEDURE — 99203 OFFICE O/P NEW LOW 30 MIN: CPT | Mod: S$GLB,,, | Performed by: PHYSICIAN ASSISTANT

## 2025-01-30 PROCEDURE — 3078F DIAST BP <80 MM HG: CPT | Mod: CPTII,S$GLB,, | Performed by: PHYSICIAN ASSISTANT

## 2025-01-30 PROCEDURE — 3074F SYST BP LT 130 MM HG: CPT | Mod: CPTII,S$GLB,, | Performed by: PHYSICIAN ASSISTANT

## 2025-01-30 PROCEDURE — 1159F MED LIST DOCD IN RCRD: CPT | Mod: CPTII,S$GLB,, | Performed by: PHYSICIAN ASSISTANT

## 2025-01-30 PROCEDURE — 4010F ACE/ARB THERAPY RXD/TAKEN: CPT | Mod: CPTII,S$GLB,, | Performed by: PHYSICIAN ASSISTANT

## 2025-01-30 PROCEDURE — 1160F RVW MEDS BY RX/DR IN RCRD: CPT | Mod: CPTII,S$GLB,, | Performed by: PHYSICIAN ASSISTANT

## 2025-01-30 NOTE — PROGRESS NOTES
"[facility]  New Patient      Referred by: Dr. Jean Mascorro    PCP: Jasmyn Cox MD    CC:   Chief Complaint   Patient presents with    [facility]     Lumbar pain          HPI:   Chloe Obrien is a 64 y.o. year old female patient who has a past medical history of 1 PPD X 20+ YRs Tobacco Use Disorder, Anticoagulant long-term use, Aortic valve stenosis, Borderline Vitamin D Deficiency, Chronic Insomnia, Chronic Recurrent Left Foreleg Pain, Depression, Diverticulosis Of Sigmoid, Decending, Transverse, and Ascending Colon; W/O Diverticulitis On 9/24/18 EGD, Fructose malabsorption, Gastroparesis, GERD With H/O Stricture Dilation, H/O Abnormal Mammogram, H/O Abnormal Mammogram, H/O Adenomatous Colon Polyps, H/O H. Pylori Gastritis 2009, H/O Nephrolithiasis, H/O of CABG 11/15/21, H/O Recurrent Cyclical Nausea, H/O Small Rectal Polyps On 9/24/18 TC; Nonbleeding, Biopsies Negative For Malignancy, Hypercholesterolemia, Hypertension, Hypothyroidism, IBS (irritable bowel syndrome), Left Hip Subcutaneous Nodule, Medium Internal Hemorrhoids With Rectal Bleeding On 9/24/18 EGD, Mitral valve prolapse, Nonalcoholic Steatohepatitis (N.A.S.H.), Nonobstructive atherosclerosis of coronary artery, Obesity, Obesity S/P Sleeve Gastrectomy With Hiatal hernia Repair 10/2015, Ocular Migraines, Osteoporosis, Partial tear of subscapularis tendon, left, subsequent encounter, Pulmonary Nodules, Rectal polyp, Renal disorder, RLQ Pain Due To Epiploic Appendagitis 08/2016, Rotator cuff syndrome of left shoulder, Rotator cuff syndrome, left, Rotator cuff syndrome, right, and RUQ Abdominal Pain 5/31/16. She presents in referral from Dr. Jean Mascorro for lumbar radicular pain.    She describes years of lower lumbar back pain with intermittent severe exacerbations where her back would "lock up".  Pain is felt daily.  It radiates from the lower lubmar ergion to the bilateral postero-lateral thighs.  It is assocaited with " burning and tingling in the bilateral feet.  Pain is present with sitting and increases with walking.  She finds some relief pushing a stroller while walking. She has seen orthopedics for deep hip pain and treated with bilateral GTB injections 1-17-25 with some relief of hip region pain. She also has chronic posterior neck pain to the shoulders and interscapular region.  At times neck pain radiates to the arms.  She takes tylenol for pain.  She has tried voltaren gel as well.      Denies bowel/ bladder incontinence.    Past and current medications:  Antineuropathics:  NSAIDs: voltaren gel  Antidepressants: buspar, wellbutrin  Muscle relaxers:  Opioids:  Antiplatelets/Anticoagulants: plavix  Others: tylenol    Physical Therapy/ Chiropractic care:  Chiropractic care for neck/ lower back years ago    Pain Intervention History:  none    Past Spine Surgical History:  none        History:    Current Outpatient Medications:     albuterol (PROAIR HFA) 90 mcg/actuation inhaler, Inhale 2 puffs into the lungs every 6 (six) hours as needed for Wheezing or Shortness of Breath. Rescue, Disp: 18 g, Rfl: 2    aspirin (ECOTRIN) 81 MG EC tablet, Take 81 mg by mouth once daily., Disp: , Rfl:     atorvastatin (LIPITOR) 40 MG tablet, TAKE 1 TABLET BY MOUTH EVERY DAY, Disp: 90 tablet, Rfl: 1    azelastine (ASTELIN) 137 mcg (0.1 %) nasal spray, 2 sprays (274 mcg total) by Nasal route 2 (two) times daily., Disp: 90 mL, Rfl: 3    buPROPion (WELLBUTRIN) 75 MG tablet, TAKE 1 TABLET BY MOUTH EVERY MORNING., Disp: 90 tablet, Rfl: 1    busPIRone (BUSPAR) 15 MG tablet, TAKE 1 TABLET BY MOUTH 2 TIMES DAILY. (Patient taking differently: Take 15 mg by mouth 3 (three) times daily.), Disp: 180 tablet, Rfl: 0    cetirizine (ZYRTEC) 10 MG tablet, Take 10 mg by mouth once daily., Disp: , Rfl:     clopidogreL (PLAVIX) 75 mg tablet, Take 75 mg by mouth once daily., Disp: , Rfl:     cyanocobalamin 1,000 mcg/mL injection, Inject 1,000 mcg into the muscle  every 30 days., Disp: , Rfl:     diclofenac sodium (VOLTAREN ARTHRITIS PAIN) 1 % Gel, Apply 2 g topically 4 (four) times daily., Disp: 100 g, Rfl: 3    ergocalciferol (ERGOCALCIFEROL) 50,000 unit Cap, TAKE 1 CAPSULE BY MOUTH ONE TIME PER WEEK, Disp: 12 capsule, Rfl: 2    ezetimibe (ZETIA) 10 mg tablet, Take 10 mg by mouth., Disp: , Rfl:     famotidine (PEPCID) 40 MG tablet, Take 40 mg by mouth nightly as needed for Heartburn., Disp: , Rfl:     fluticasone propionate (FLONASE) 50 mcg/actuation nasal spray, 2 sprays (100 mcg total) by Each Nostril route once daily., Disp: 16 g, Rfl: 11    fluticasone-umeclidin-vilanter (TRELEGY ELLIPTA) 100-62.5-25 mcg DsDv, Inhale 1 puff into the lungs once daily., Disp: 1 each, Rfl: 5    hydrOXYzine pamoate (VISTARIL) 25 MG Cap, TAKE 1 CAPSULE BY MOUTH THREE TIMES A DAY, Disp: 270 capsule, Rfl: 0    Lactobacillus rhamnosus GG (CULTURELLE) 10 billion cell capsule, Take 1 capsule by mouth 2 (two) times a day., Disp: , Rfl:     levothyroxine (SYNTHROID) 112 MCG tablet, Take 112 mcg by mouth. Once weekly, Disp: , Rfl:     levothyroxine (SYNTHROID) 125 MCG tablet, Take 125 mcg by mouth before breakfast. 6 days weekly, Disp: , Rfl:     montelukast (SINGULAIR) 10 mg tablet, Take 1 tablet (10 mg total) by mouth every evening., Disp: 90 tablet, Rfl: 3    MOUNJARO 10 mg/0.5 mL PnIj, Inject into the skin., Disp: , Rfl:     multivit-min/ferrous fumarate (MULTI VITAMIN ORAL), Take by mouth., Disp: , Rfl:     nitroGLYCERIN (NITROSTAT) 0.4 MG SL tablet, , Disp: , Rfl:     olmesartan (BENICAR) 20 MG tablet, Take 20 mg by mouth 2 (two) times daily., Disp: , Rfl:     omeprazole (PRILOSEC) 40 MG capsule, Take 1 capsule (40 mg total) by mouth 2 (two) times daily before meals., Disp: 60 capsule, Rfl: 1    ondansetron (ZOFRAN-ODT) 4 MG TbDL, DISSOLVE 1 TABLET BY MOUTH EVERY 6 HOURS AS NEEDED FOR NAUSEA AND VOMITING, Disp: , Rfl:     propranoloL (INDERAL) 20 MG tablet, TAKE 1 TABLET BY MOUTH TWICE A DAY,  "Disp: 180 tablet, Rfl: 1    traZODone (DESYREL) 150 MG tablet, TAKE 1 TABLET BY MOUTH EVERY EVENING., Disp: 90 tablet, Rfl: 0    triamterene-hydrochlorothiazide 37.5-25 mg (DYAZIDE) 37.5-25 mg per capsule, Take 1 capsule by mouth once daily., Disp: , Rfl:     ubidecarenone (CO Q-10 ORAL), Take by mouth., Disp: , Rfl:     UNABLE TO FIND, UNITHYROID 125 mcg-pt takes 6 times weekly, Disp: , Rfl:     vilazodone (VIIBRYD) 40 mg Tab tablet, Take 1 tablet (40 mg total) by mouth once daily., Disp: 90 tablet, Rfl: 1    vitamin D (VITAMIN D3) 1000 units Tab, Take 1,000 Units by mouth once daily., Disp: , Rfl:     Past Medical History:   Diagnosis Date    1 PPD X 20+ YRs Tobacco Use Disorder     11/14/18 I Advocated Smoking Cessation And RXd     Anticoagulant long-term use     Aortic valve stenosis     Borderline Vitamin D Deficiency     10/21/18 And 4/4/16 RXd OTC D3 2K Daily    Chronic Insomnia `    On Restoril 30 Mg qHS PRN, Trazodone 100 Mg qHS Caused Dizziness And Headaches    Chronic Recurrent Left Foreleg Pain     Depression     12/21/16 Decreased Wellbutrin Further And Increased Zoloft To 100 Mg Daily; 11/8/16 Decreased Wellbutrin-XL To 300 Mg qAM And RXd Zoloft 50 Mg qAM; She Sees A Therapist For This; Lexapro Was Innefective    Diverticulosis Of Sigmoid, Decending, Transverse, and Ascending Colon; W/O Diverticulitis On 9/24/18 EGD     Dr. KIRAN Heck    Fructose malabsorption     Gastroparesis     Dr. KIRAN Heck On 5/2/17 Continued Her Post Gastric Sleeve Diet    GERD With H/O Stricture Dilation     Dr. KIRAN Heck    H/O Abnormal Mammogram     F/U Mammograms And U/S Have Been Stable    H/O Abnormal Mammogram     F/U Mammograms And U/S Have Been Stable     H/O Adenomatous Colon Polyps     Dr. KIRAN Heck TC On 9/24/18 Rectal Polyps Only; "Repeat TC In 5 YRs"    H/O H. Pylori Gastritis 2009     Dr. KIRAN Heck; Received Triple Tx Then    H/O Nephrolithiasis     6/3/16 " "ABD/Pelvic CT W/W/O = 1 CM Right Renal Stone Only; 5/26/15 ABD/Pelvic CT With IV Contrast Only = Positive For A Nonobstructing Right Renal Stone    H/O of CABG 11/15/21     CABG X3 With [place]man-Vein-Endovascular (Left)     H/O Recurrent Cyclical Nausea     Dr. KIRAN Heck    H/O Small Rectal Polyps On 9/24/18 TC; Nonbleeding, Biopsies Negative For Malignancy     Dr. Jonh Heck; "Repeat TC IN 5 YRs"    Hypercholesterolemia     On Livalo 4 Mg qHS    Hypertension     no longer taking medication since weight loss    Hypothyroidism     On [place] Thyroid    IBS (irritable bowel syndrome)     Left Hip Subcutaneous Nodule     1/16/25 Referred To Dr. Clarence Negron    Medium Internal Hemorrhoids With Rectal Bleeding On 9/24/18 EGD     Dr. KIRAN Heck; Will Schedule Clinic Visit For Hemorrhoid Banding    Mitral valve prolapse     Nonalcoholic Steatohepatitis (N.A.S.H.)     Dr. KIRAN Heck On 8/18/16 Ordered A Liver U/S And LFTs    Nonobstructive atherosclerosis of coronary artery     Obesity     Obesity S/P Sleeve Gastrectomy With Hiatal hernia Repair 10/2015     Dr. Scot Werner    Ocular Migraines     Osteoporosis     10/21/18 RXd Fosamax 70 Mg Weekly, And OTC D3 2K IU Daily, And OTC CaCO3 600 Mg Daily    Partial tear of subscapularis tendon, left, subsequent encounter     08/2020    Pulmonary Nodules     Dr. Luis F Evans Will Repeat This In 1 Year; 8/7/18 Low Dose Chest CT = 1 Small Nodule In Each Lung    Rectal polyp     Renal disorder     nephrolithiasis    RLQ Pain Due To Epiploic Appendagitis 08/2016     Dr. KIRAN Heck TXd This With Augmentin And Naproxen After Cipro And Flagyl Failed    Rotator cuff syndrome of left shoulder 08/2020    Rotator cuff syndrome, left 08/10/2020    Rotator cuff syndrome, right 05/10/2021    RUQ Abdominal Pain 5/31/16     6/3/16 ABD/Pelvic CT W/W/O = 1 CM Right Renal Stone Only; 5/31/16 ABD XRays = Unremarkable; 5/31/16 Increased Her PPI To Bid X 1 " Week       Past Surgical History:   Procedure Laterality Date    ANGIOGRAM, CORONARY, WITH LEFT HEART CATHETERIZATION N/A 11/22/2022    Procedure: ANGIOGRAM,CORONARY,WITH LEFT HEART CATHETERIZATION;  Surgeon: Zack Romero MD;  Location: [facility] CATH;  Service: Cardiology;  Laterality: N/A;    ANGIOGRAPHY OF LOWER EXTREMITY N/A 11/22/2022    Procedure: ANGIOGRAM, LOWER EXTREMITY;  Surgeon: Zack Romero MD;  Location: [facility] CATH;  Service: Cardiology;  Laterality: N/A;    ARTHROSCOPIC REPAIR OF ROTATOR CUFF OF SHOULDER Left 09/02/2020    Procedure: REPAIR, ROTATOR CUFF, ARTHROSCOPIC;  Surgeon: Clifford Kendrick II, MD;  Location: [facility] OR;  Service: Orthopedics;  Laterality: Left;    ARTHROSCOPY OF SHOULDER WITH DECOMPRESSION OF SUBACROMIAL SPACE Left 09/02/2020    Procedure: ARTHROSCOPY, SHOULDER, WITH SUBACROMIAL SPACE DECOMPRESSION;  Surgeon: Clifford Kendrick II, MD;  Location: [facility] OR;  Service: Orthopedics;  Laterality: Left;    BUNIONECTOMY      CARPAL TUNNEL RELEASE      CHOLECYSTECTOMY      COLONOSCOPY      september 2015    COLONOSCOPY N/A 11/26/2024    Procedure: COLONOSCOPY;  Surgeon: Kevin Turpin MD;  Location: [facility] ENDO;  Service: Gastroenterology;  Laterality: N/A;    CORONARY ARTERY BYPASS GRAFT (CABG) N/A 11/15/2021    Procedure: CORONARY ARTERY BYPASS GRAFT (CABG) x 3;  Surgeon: Fabian Montana MD;  Location: [facility] OR [address] [place] [address];  Service: Cardiothoracic;  Laterality: N/A;    ENDOSCOPIC HARVEST OF VEIN Left 11/15/2021    Procedure: HARVEST-VEIN-ENDOVASCULAR;  Surgeon: Fabian Montana MD;  Location: [facility] OR [address] [place] [address];  Service: Cardiothoracic;  Laterality: Left;  Vein harvest start: 1430  Vein harvest stop: 1504  Vein prep start: 1504  Vein prep stop: 1527    ESOPHAGOGASTRODUODENOSCOPY  09/15/2023    ESOPHAGOGASTRODUODENOSCOPY N/A 11/26/2024    Procedure: EGD (ESOPHAGOGASTRODUODENOSCOPY);  Surgeon: Kevin Turpin MD;  Location: [facility] ENDO;  Service: Gastroenterology;  Laterality:  N/A;    EXCISIONAL HEMORRHOIDECTOMY N/A 05/08/2019    Procedure: HEMORRHOIDECTOMY;  Surgeon: Jelani Leal MD;  Location: [facility] OR;  Service: General;  Laterality: N/A;    FIXATION OF TENDON Left 09/02/2020    Procedure: FIXATION, TENDON - open biceps tenodesis;  Surgeon: Clifford Kendrick II, MD;  Location: [facility] OR;  Service: Orthopedics;  Laterality: Left;    FLEXIBLE SIGMOIDOSCOPY Left 11/26/2018    Procedure: SIGMOIDOSCOPY, FLEXIBLE;  Surgeon: Anatoliy Simms MD;  Location: [facility] ENDO;  Service: Endoscopy;  Laterality: Left;    GASTRECTOMY      gastric sleeve    gastric sleeve  10/2015    HERNIA REPAIR      HYSTERECTOMY      total @ age 34    LEFT HEART CATHETERIZATION Left 11/09/2021    Procedure: Left heart cath;  Surgeon: Samantha Vergara MD;  Location: [facility] CATH;  Service: Cardiology;  Laterality: Left;    OOPHORECTOMY      w/Hysterectomy @ age 34    TUBAL LIGATION         Family History   Problem Relation Name Age of Onset    Stomach cancer Mother      Heart disease Father      Hypertension Father      Hypothyroidism Father      Valvular heart disease Father      Mitral valve prolapse Sister      Wake[place]'s esophagus Sister      Diabetes Maternal Grandmother      Schizophrenia Maternal Grandfather      Heart disease Paternal Grandfather      Hypothyroidism Daughter      Hyperlipidemia Daughter      Hypothyroidism Daughter      Hyperlipidemia Daughter      Colon cancer Paternal Aunt      Breast cancer Paternal Aunt  55    Cancer Paternal Aunt      Crohn's disease Neg Hx      Ulcerative colitis Neg Hx         Social History     Socioeconomic History    Marital status:      Spouse name: Vlad    Number of children: 3   Occupational History     Employer: [employer]   Tobacco Use    Smoking status: Former     Current packs/day: 0.00     Average packs/day: 0.8 packs/day for 43.0 years (34.4 ttl pk-yrs)     Types: Cigarettes     Start date: 8/26/1977     Quit date: 8/26/2020     Years  since quittin.4    Smokeless tobacco: Never    Tobacco comments:     quit 2018 restarted recently and quit 3 days ago   Substance and Sexual Activity    Alcohol use: No     Alcohol/week: 0.0 standard drinks of alcohol    Drug use: Never    Sexual activity: Yes     Birth control/protection: None, Post-menopausal, See Surgical Hx     Social Drivers of Health     Financial Resource Strain: Medium Risk (2024)    Overall Financial Resource Strain (CARDIA)     Difficulty of Paying Living Expenses: Somewhat hard   Food Insecurity: Food Insecurity Present (2024)    Hunger Vital Sign     Worried About Running Out of Food in the Last Year: Patient declined     Ran Out of Food in the Last Year: Sometimes true   Transportation Needs: No Transportation Needs (2024)    PRAPARE - Transportation     Lack of Transportation (Medical): No     Lack of Transportation (Non-Medical): No   Physical Activity: Insufficiently Active (2024)    Exercise Vital Sign     Days of Exercise per Week: 1 day     Minutes of Exercise per Session: 30 min   Stress: Stress Concern Present (2024)    [employer] [place] [employer] - Occupational Stress Questionnaire     Feeling of Stress : To some extent   Housing Stability: Unknown (2024)    Housing Stability Vital Sign     Unable to Pay for Housing in the Last Year: Patient declined     Unstable Housing in the Last Year: No       Review of patient's allergies indicates:   Allergen Reactions    Keflex [cephalexin] Swelling     Other reaction(s): throat swelilng    Levofloxacin Diarrhea       Labs:  Lab Results   Component Value Date    LABA1C 5.3 2016    HGBA1C 5.3 2024       Lab Results   Component Value Date    WBC 5.73 2024    HGB 12.4 2024    HCT 38.1 2024    MCV 89 2024     2024           Review of Systems:  Lower back pain.  Hip and leg pain.  Neck pain.  Balance of review of systems is negative.    Physical  "Exam:  Vitals:    01/30/25 1007   BP: 110/66   Pulse: 69   Resp: 18   Weight: 67.6 kg (149 lb 0.5 oz)   Height: 5' 3" (1.6 m)   PainSc:   5   PainLoc: Neck     Body mass index is 26.4 kg/m².    Pain disability index:       No data to display                Gen: NAD  Psych: mood appropriate for given condition  HEENT: eyes anicteric   CV: RRR, 2+ radial pulse  Respiratory: non-labored, no signs of respiratory distress  Abd: non-distended  Skin: warm, dry and intact.  Gait: Able to heel walk, toe walk. No antalgic gait.       Cervical spine: ROM is full in flexion, extension and lateral rotation without increased pain.  Spurling's maneuver causes no neck pain to either side.  Myofascial exam: No Tenderness to palpation across cervical paraspinous region bilaterally.    Lumbar spine:  Lumbar spine: ROM is full with flexion extension and oblique extension with no increased pain.    Fabian's test causes no increased pain on either side.    Supine straight leg raise is positive for buttock pain bilaterally.  Internal and external rotation of the hip causes no increased pain on either side.  Myofascial exam: No tenderness to palpation across lumbar paraspinous muscles. No tenderness to palpation over the bilateral greater trochanters and bilateral SI joint    Sensory:  Intact and symmetrical to light touch in C4-T1 dermatomes bilaterally. Intact and symmetrical to light touch in L1-S1 dermatomes bilaterally.    Motor:    Right Left   C4 Shoulder Abduction  5  5   C5 Elbow Flexion    5  5   C6 Wrist Extension  5  5   C7 Elbow Extension   5  5   C8/T1 Hand Intrinsics   5  5        Right Left   L2/3 Iliacus Hip flexion  5  5   L3/4 Qudratus Femoris Knee Extension  5  5   L4/5 Tib Anterior Ankle Dorsiflexion   5  5   L5/S1 Extensor Hallicus Longus Great toe extension  5  5   S1/S2 Gastroc/Soleus Plantar Flexion  5  5      Right Left   Triceps DTR 2+ 2+   Biceps DTR 2+ 2+   Brachioradialis DTR 2+ 2+   Patellar DTR 2+ 2+ "   Achilles DTR 2+ 2+   Ness Absent  Absent   Clonus Absent Absent   Babinski Absent Absent       Imaging:  No spine imaging to review.       Assessment:   Chloe Obrien is a 64 y.o. year old female patient who has a past medical history of 1 PPD X 20+ YRs Tobacco Use Disorder, Anticoagulant long-term use, Aortic valve stenosis, Borderline Vitamin D Deficiency, Chronic Insomnia, Chronic Recurrent Left Foreleg Pain, Depression, Diverticulosis Of Sigmoid, Decending, Transverse, and Ascending Colon; W/O Diverticulitis On 9/24/18 EGD, Fructose malabsorption, Gastroparesis, GERD With H/O Stricture Dilation, H/O Abnormal Mammogram, H/O Abnormal Mammogram, H/O Adenomatous Colon Polyps, H/O H. Pylori Gastritis 2009, H/O Nephrolithiasis, H/O of CABG 11/15/21, H/O Recurrent Cyclical Nausea, H/O Small Rectal Polyps On 9/24/18 TC; Nonbleeding, Biopsies Negative For Malignancy, Hypercholesterolemia, Hypertension, Hypothyroidism, IBS (irritable bowel syndrome), Left Hip Subcutaneous Nodule, Medium Internal Hemorrhoids With Rectal Bleeding On 9/24/18 EGD, Mitral valve prolapse, Nonalcoholic Steatohepatitis (N.A.S.H.), Nonobstructive atherosclerosis of coronary artery, Obesity, Obesity S/P Sleeve Gastrectomy With Hiatal hernia Repair 10/2015, Ocular Migraines, Osteoporosis, Partial tear of subscapularis tendon, left, subsequent encounter, Pulmonary Nodules, Rectal polyp, Renal disorder, RLQ Pain Due To Epiploic Appendagitis 08/2016, Rotator cuff syndrome of left shoulder, Rotator cuff syndrome, left, Rotator cuff syndrome, right, and RUQ Abdominal Pain 5/31/16. She presents in referral from Dr. Jean Mascorro for lumbar radiculoapthy.    She has neck pain with possible cervical radiculoapthy.  She has lower back pain with lumbar radiculoapthy vs neurogenic claudication.  No neurological deficits.  No prior treatment or work up.    Plan:  - will continue with tylenol for pain.  She is taking plavix and cannot tolerate nsaids at  this time.  - refer to PT for neck and back pain.  - follow up in 6 weeks.  If no improvement then consider MRI of cervical and lumbar spine to further assess and consider interventional procedures.     Problem List Items Addressed This Visit    None  Visit Diagnoses       Lumbar radiculopathy    -  Primary    Relevant Orders    Ambulatory Referral/Consult to Physical Therapy/Occupational Therapy    Cervicalgia        Relevant Orders    Ambulatory Referral/Consult to Physical Therapy/Occupational Therapy            Follow Up: RTC in 6 weeks    : Reviewed and consistent with medication use as prescribed.    Thank you for referring this interesting patient, and I look forward to continuing to collaborate in her care.        Candelaria Appiah PA-C  [facility]

## 2025-02-15 ENCOUNTER — HOSPITAL ENCOUNTER (OUTPATIENT)
Dept: RADIOLOGY | Facility: HOSPITAL | Age: 65
Discharge: HOME | End: 2025-02-15
Attending: NURSE PRACTITIONER
Payer: COMMERCIAL

## 2025-02-15 DIAGNOSIS — R42 DIZZINESS AND GIDDINESS: ICD-10-CM

## 2025-02-15 DIAGNOSIS — R42 DIZZINESS: ICD-10-CM

## 2025-02-15 PROCEDURE — 70450 CT HEAD/BRAIN W/O DYE: CPT | Mod: TC,PO

## 2025-02-25 ENCOUNTER — RESULTS FOLLOW-UP (OUTPATIENT)
Dept: OTOLARYNGOLOGY | Facility: CLINIC | Age: 65
End: 2025-02-25

## 2025-02-25 ENCOUNTER — OFFICE VISIT (OUTPATIENT)
Dept: OTOLARYNGOLOGY | Facility: CLINIC | Age: 65
End: 2025-02-25
Payer: COMMERCIAL

## 2025-02-25 ENCOUNTER — HOSPITAL ENCOUNTER (OUTPATIENT)
Dept: CARDIOLOGY | Facility: HOSPITAL | Age: 65
Discharge: HOME | End: 2025-02-25
Attending: OTOLARYNGOLOGY
Payer: COMMERCIAL

## 2025-02-25 ENCOUNTER — TELEPHONE (OUTPATIENT)
Dept: OTOLARYNGOLOGY | Facility: CLINIC | Age: 65
End: 2025-02-25

## 2025-02-25 VITALS — BODY MASS INDEX: 25.77 KG/M2 | WEIGHT: 145.5 LBS

## 2025-02-25 DIAGNOSIS — J32.9 RECURRENT SINUSITIS: ICD-10-CM

## 2025-02-25 DIAGNOSIS — I65.23 BILATERAL CAROTID ARTERY STENOSIS: ICD-10-CM

## 2025-02-25 DIAGNOSIS — R42 DIZZINESS: ICD-10-CM

## 2025-02-25 DIAGNOSIS — R76.8 WEAK ANTIBODY RESPONSE TO PNEUMOCOCCAL VACCINE: ICD-10-CM

## 2025-02-25 DIAGNOSIS — I65.23 BILATERAL CAROTID ARTERY STENOSIS: Primary | ICD-10-CM

## 2025-02-25 LAB
LEFT ARM DIASTOLIC BLOOD PRESSURE: 66 MMHG
LEFT ARM SYSTOLIC BLOOD PRESSURE: 110 MMHG
LEFT CBA DIAS: 23 CM/S
LEFT CBA SYS: 70 CM/S
LEFT CCA DIST DIAS: 27 CM/S
LEFT CCA DIST SYS: 93 CM/S
LEFT CCA MID DIAS: 23 CM/S
LEFT CCA MID SYS: 87 CM/S
LEFT CCA PROX DIAS: 23 CM/S
LEFT CCA PROX SYS: 83 CM/S
LEFT ECA DIAS: 22 CM/S
LEFT ECA SYS: 97 CM/S
LEFT ICA DIST DIAS: 32 CM/S
LEFT ICA DIST SYS: 95 CM/S
LEFT ICA MID DIAS: 32 CM/S
LEFT ICA MID SYS: 85 CM/S
LEFT ICA PROX DIAS: 23 CM/S
LEFT ICA PROX SYS: 64 CM/S
LEFT VERTEBRAL DIAS: 23 CM/S
LEFT VERTEBRAL SYS: 77 CM/S
OHS CV CAROTID RIGHT ICA EDV HIGHEST: 36
OHS CV CAROTID ULTRASOUND LEFT ICA/CCA RATIO: 1.02
OHS CV CAROTID ULTRASOUND RIGHT ICA/CCA RATIO: 1.4
OHS CV PV CAROTID LEFT HIGHEST CCA: 93
OHS CV PV CAROTID LEFT HIGHEST ICA: 95
OHS CV PV CAROTID RIGHT HIGHEST CCA: 97
OHS CV PV CAROTID RIGHT HIGHEST ICA: 105
OHS CV US CAROTID LEFT HIGHEST EDV: 32
RIGHT ARM DIASTOLIC BLOOD PRESSURE: 66 MMHG
RIGHT ARM SYSTOLIC BLOOD PRESSURE: 110 MMHG
RIGHT CBA DIAS: 15 CM/S
RIGHT CBA SYS: 46 CM/S
RIGHT CCA DIST DIAS: 20 CM/S
RIGHT CCA DIST SYS: 75 CM/S
RIGHT CCA MID DIAS: 27 CM/S
RIGHT CCA MID SYS: 97 CM/S
RIGHT CCA PROX DIAS: 25 CM/S
RIGHT CCA PROX SYS: 90 CM/S
RIGHT ECA DIAS: 21 CM/S
RIGHT ECA SYS: 91 CM/S
RIGHT ICA DIST DIAS: 36 CM/S
RIGHT ICA DIST SYS: 105 CM/S
RIGHT ICA MID DIAS: 32 CM/S
RIGHT ICA MID SYS: 87 CM/S
RIGHT ICA PROX DIAS: 24 CM/S
RIGHT ICA PROX SYS: 72 CM/S
RIGHT VERTEBRAL DIAS: 16 CM/S
RIGHT VERTEBRAL SYS: 51 CM/S

## 2025-02-25 PROCEDURE — 99999 PR PBB SHADOW E&M-EST. PATIENT-LVL IV: CPT | Mod: PBBFAC,,, | Performed by: OTOLARYNGOLOGY

## 2025-02-25 PROCEDURE — 1159F MED LIST DOCD IN RCRD: CPT | Mod: CPTII,S$GLB,, | Performed by: OTOLARYNGOLOGY

## 2025-02-25 PROCEDURE — 99214 OFFICE O/P EST MOD 30 MIN: CPT | Mod: S$GLB,,, | Performed by: OTOLARYNGOLOGY

## 2025-02-25 PROCEDURE — 93880 EXTRACRANIAL BILAT STUDY: CPT | Mod: 26,,, | Performed by: INTERNAL MEDICINE

## 2025-02-25 PROCEDURE — 3008F BODY MASS INDEX DOCD: CPT | Mod: CPTII,S$GLB,, | Performed by: OTOLARYNGOLOGY

## 2025-02-25 PROCEDURE — 3044F HG A1C LEVEL LT 7.0%: CPT | Mod: CPTII,S$GLB,, | Performed by: OTOLARYNGOLOGY

## 2025-02-25 PROCEDURE — 4010F ACE/ARB THERAPY RXD/TAKEN: CPT | Mod: CPTII,S$GLB,, | Performed by: OTOLARYNGOLOGY

## 2025-02-25 PROCEDURE — 1160F RVW MEDS BY RX/DR IN RCRD: CPT | Mod: CPTII,S$GLB,, | Performed by: OTOLARYNGOLOGY

## 2025-02-25 PROCEDURE — 93880 EXTRACRANIAL BILAT STUDY: CPT | Mod: PO

## 2025-02-25 NOTE — PROGRESS NOTES
Subjective:       Patient ID: Chloe Obrien is a 64 y.o. female.    Chief Complaint: Dizziness      Chloe is here for follow-up.   Here today for dizziness.   She has a woozy sensation when she stands up and feels imbalance. This has been going on for > 6 months.    She also has a brief sensation of with head positional changes - she feels nausea, feels lightheaded, like she is going to black out- distinct from the above. Lasts seconds - minutes.  No change to hearing.    Has also been noticing some increased nasal symptoms for 5-6 days. We discussed CT if persistent infection issue.     Has headaches, could be focal over the right frontal region and occipital region.   She has cardiac issues - CABG, MVP, Carotid artery stenosis.   Cardiologist: Gifty.     Last OV November 24 for recurrent sinusitis  Pneumococcal protection was low and I recommended a Pneumovax.  Has not seen neurology.    Patient validated questionnaires (if applicable):      %            No data to display                   No data to display                   No data to display                     Review of Systems   Constitutional: Negative for activity change and appetite change.   Respiratory: Negative for difficulty breathing and wheezing   Cardiovascular: Negative for chest pain.      Objective:        Constitutional:   Vital signs are normal. She appears well-developed and well-nourished.     Head:  Normocephalic and atraumatic.     Ears:  Hearing normal to normal and whispered voice; external ear normal without scars, lesions, or masses; ear canal, tympanic membrane, and middle ear normal..   Right Radha-Hallpike - No vertigo, No  rotary nystagmus  Left [place]-Hallpike - No  vertigo, No rotary nystagmus    Normal HIT  Imbalance on standing    Nose:  Nose normal including turbinates, nasal mucosa, sinuses and nasal septum. Septal deviation (mild L c-chaped) present.     Mouth/Throat  Oropharynx clear and moist without lesions or asymmetry.  Tonsils present, +1.      Neck:  Neck normal without thyromegaly masses, asymmetry, normal tracheal structure, crepitus, and tenderness.         Tests / Results:    Carotid doppler 2021  Impression   =========     RIGHT SIDE:   1-39% Right ICA stenosis.   Homogeneous plaque noted in the right internal carotid artery.   Heterogeneous plaque noted in the right common carotid artery.   Antegrade flow noted in the right vertebral artery.       LEFT SIDE:   1-39% Left ICA stenosis.   Homogeneous plaque noted in the left internal carotid artery.   Heterogeneous plaque noted in the left common carotid artery.   Antegrade flow noted in the left vertebral artery.     CT head 2/2025:  CLINICAL HISTORY:  Dizziness, non-specific; Dizziness and giddiness     TECHNIQUE:  Routine unenhanced axial images were obtained through the head.  Sagittal and coronal reformatted images were created.  The study is reviewed in bone and soft tissue windows.     COMPARISON:  None     FINDINGS:  Intracranial contents: There is no acute intracranial abnormality.  Brain volume, ventricular size and position are normal.  There is no hemorrhage or mass/mass effect.  There is no acute edema or ischemia.  The gray-white interface is preserved without obvious acute infarction.  There are no definite regions of abnormal attenuation in the brain.  There is no dense vessel.  There is no abnormal extra-axial fluid collection.  The basilar cisterns are open.  The cerebellar tonsils are in normal position at the level of the foramen magnum.  There is a mildly expanded empty sella.  The orbits are grossly normal.     Extracranial contents, calvarium, soft tissues: The calvarium is normal.  The included paranasal sinuses and mastoid air cells are clear.     Impression:     1.  There is no acute intracranial abnormality.  There is no hemorrhage, mass/mass effect, acute edema or ischemia.     2.  There is a mildly expanded empty sella.    Assessment:       1.  Bilateral carotid artery stenosis    2. Dizziness          Plan:       Discussed ears are normal. No vestibular disorder suspected    Discussed her more chronic issues may be related to lumbar problems.  Consider PT / eval for this    Newer symptoms may be vascular in nature.  Had mild narrowing in 2021 but does have a history of coronary artery disease  Carotid doppler repeat.     Monitor sinus symptoms for another week or two and continue saline and flonase.  Discussed CT sinus if symptoms persist for evaluation of recurrent sinus infections.

## 2025-02-25 NOTE — TELEPHONE ENCOUNTER
S/w pt and advised the orders for her pneumovax has been sent to her [facility] pharmacy, she verbalized understanding.

## 2025-02-25 NOTE — TELEPHONE ENCOUNTER
----- Message from Catia sent at 2/25/2025 10:41 AM CST -----  Regarding: Shot order followup  Pt is wondering if the orders to get her shot have been put in yet.  I only see orders from November for blood work.  Please contact pt at [phone].

## 2025-02-25 NOTE — TELEPHONE ENCOUNTER
S/w pt and she is at the [facility] pharmacy waiting for pneumovax orders, please place orders, thanks.

## 2025-02-27 ENCOUNTER — OFFICE VISIT (OUTPATIENT)
Dept: PSYCHIATRY | Facility: CLINIC | Age: 65
End: 2025-02-27
Payer: COMMERCIAL

## 2025-02-27 VITALS
SYSTOLIC BLOOD PRESSURE: 84 MMHG | HEIGHT: 61 IN | BODY MASS INDEX: 27.28 KG/M2 | HEART RATE: 79 BPM | DIASTOLIC BLOOD PRESSURE: 54 MMHG | WEIGHT: 144.5 LBS

## 2025-02-27 DIAGNOSIS — F50.814 BINGE EATING DISORDER IN REMISSION: ICD-10-CM

## 2025-02-27 DIAGNOSIS — G47.00 INSOMNIA, UNSPECIFIED TYPE: ICD-10-CM

## 2025-02-27 DIAGNOSIS — F41.1 GENERALIZED ANXIETY DISORDER: ICD-10-CM

## 2025-02-27 DIAGNOSIS — F33.40 RECURRENT MAJOR DEPRESSIVE DISORDER, IN REMISSION: Primary | ICD-10-CM

## 2025-02-27 PROCEDURE — 3074F SYST BP LT 130 MM HG: CPT | Mod: CPTII,S$GLB,,

## 2025-02-27 PROCEDURE — G2211 COMPLEX E/M VISIT ADD ON: HCPCS | Mod: S$GLB,,,

## 2025-02-27 PROCEDURE — 3008F BODY MASS INDEX DOCD: CPT | Mod: CPTII,S$GLB,,

## 2025-02-27 PROCEDURE — 3044F HG A1C LEVEL LT 7.0%: CPT | Mod: CPTII,S$GLB,,

## 2025-02-27 PROCEDURE — 4010F ACE/ARB THERAPY RXD/TAKEN: CPT | Mod: CPTII,S$GLB,,

## 2025-02-27 PROCEDURE — 99215 OFFICE O/P EST HI 40 MIN: CPT | Mod: S$GLB,,,

## 2025-02-27 PROCEDURE — 1159F MED LIST DOCD IN RCRD: CPT | Mod: CPTII,S$GLB,,

## 2025-02-27 PROCEDURE — 3078F DIAST BP <80 MM HG: CPT | Mod: CPTII,S$GLB,,

## 2025-02-27 PROCEDURE — 1160F RVW MEDS BY RX/DR IN RCRD: CPT | Mod: CPTII,S$GLB,,

## 2025-02-27 PROCEDURE — 99999 PR PBB SHADOW E&M-EST. PATIENT-LVL V: CPT | Mod: PBBFAC,,,

## 2025-02-27 RX ORDER — TRAZODONE HYDROCHLORIDE 150 MG/1
150 TABLET ORAL NIGHTLY
Qty: 90 TABLET | Refills: 0 | Status: SHIPPED | OUTPATIENT
Start: 2025-02-27

## 2025-02-27 RX ORDER — VILAZODONE HYDROCHLORIDE 40 MG/1
40 TABLET ORAL DAILY
Qty: 90 TABLET | Refills: 1 | Status: SHIPPED | OUTPATIENT
Start: 2025-02-27

## 2025-02-27 RX ORDER — BUSPIRONE HYDROCHLORIDE 15 MG/1
15 TABLET ORAL 2 TIMES DAILY
Qty: 180 TABLET | Refills: 0 | Status: SHIPPED | OUTPATIENT
Start: 2025-02-27

## 2025-02-27 RX ORDER — HYDROXYZINE PAMOATE 25 MG/1
25 CAPSULE ORAL 3 TIMES DAILY
Qty: 270 CAPSULE | Refills: 0 | Status: SHIPPED | OUTPATIENT
Start: 2025-02-27

## 2025-02-27 NOTE — PROGRESS NOTES
"OUTPATIENT PSYCHIATRY FOLLOW UP VISIT    Encounter Date: 2/27/2025  Clinical Status of Patient:  Outpatient (Ambulatory)    Chief Complaint:  Chloe Obrien is a 64 y.o. female who presents today for follow-up.  Met with patient.      HISTORY OF PRESENTING ILLNESS:  Chloe Obrien is a 64 y.o. female with history of MDD, KIANA, BED, insomnia, s/p sleeve gastrectomy, CABGx3 who presents for follow up appointment.      6/13/2023: Pharmacogenomic test results reviewed with Pt. Pt has NO major gene-drug interactions found and only 4 medications listed under moderate gene-drug interactions (clozapine, olanzapine, asenapine, and nicotine). Decreased MTHFR activity.     Plan at last appointment:   Continue vilazodone 40 mg daily for mood, anxiety, and irritability  Continue bupropion 75 mg q.a.m. for mood  Continue buspirone 15 mg t.i.d. for generalized anxiety and irritability  Continue hydroxyzine 50 mg q.h.s. p.r.n. anxiety/insomnia (Pt agrees to stop taking b.i.d. and to only take p.r.n. for severe anxiety / panic)  Continue trazodone 150 mg q.h.s. p.r.n. insomnia (residual insomnia at 100 mg dose)  Continue L-methylfolate 15 milligrams daily due to decreased MTHFR activity  Pt has discontinued regular individual psychotherapy with ERVIN Price LCSW, but will schedule p.r.n. if needed    Psychotropic medication history:   Ramelteon 8 mg q.h.s., xanax 0.5 mg TID, Adderall IR 15 mg BID,   Topamax ("tingling, wicked medicine")  Zoloft, celexa, cymbalta, pristiq (before heart surgery, took off of everything), Lexapro (ineffective), Effexor (ineffective),    Wellbutrin   Unsure of previous trial of rexulti  Denies previous trials of mirtazanikipnregine, Abilify, vraylar      INTERVAL HISTORY:    Today, Pt reports her mood has been stable, "I've been on an even keel. I'm happy to be where I'm at."     Anxiety is well controlled.     Taking Mounjaro. Has lost 45 lbs over the last year.     Grand kids are doing well. Pt continues " to baby sit her grandchildren on a regular basis, which she enjoys.    Is patient experiencing or having changes in:  Trouble with sleep: sleeping well with trazodone   Appetite changes: decreased with Mounjaro  Weight changes: has lost 45 lbs over the last year with Mounjaro  Lack of energy: no  Anhedonia:  no  Somatic symptoms:  no  Anxiety/panic: well controlled  Irritability: well controlled  Guilty/hopeless: no   Concentration: no  Racing thoughts: no  Impulsive behaviors: no  Paranoia/AVH: no  Self-injurious behavior/risky behavior:  no  Any drugs:  no  Alcohol:  no    No interval episodes with symptoms consistent with rosendo or hypomania.  Denied interval or current suicidal/homicidal thoughts, intent, or plan or NSSI.  Denied other questions and concerns.    Medication side effects: None  Medication adherence: yes    MEDICAL REVIEW OF SYSTEMS:   Pain:  +chronic pain  Constitutional:   Denies fever  Cardiovascular: Denies chest pain or exertional dyspnea.  Respiratory: Robbin cough or orthopnea.   GI: Denies abdominal pain, N/V  Neurological: Denies tremor, seizure, or focal weakness.  Psychiatric: See HPI above.    PAST PSYCHIATRIC, MEDICAL, AND SOCIAL HISTORY REVIEWED  The patient's past medical, family and social history have been reviewed and updated as appropriate within the electronic medical record - see encounter notes.    PAST MEDICAL HISTORY:   Past Medical History:   Diagnosis Date    1 PPD X 20+ YRs Tobacco Use Disorder     11/14/18 I Advocated Smoking Cessation And RXd     Anticoagulant long-term use     Aortic valve stenosis     Borderline Vitamin D Deficiency     10/21/18 And 4/4/16 RXd OTC D3 2K Daily    Chronic Insomnia `    On Restoril 30 Mg qHS PRN, Trazodone 100 Mg qHS Caused Dizziness And Headaches    Chronic Recurrent Left Foreleg Pain     Depression     12/21/16 Decreased Wellbutrin Further And Increased Zoloft To 100 Mg Daily; 11/8/16 Decreased Wellbutrin-XL To 300 Mg qAM And RXd Zoloft  "50 Mg qAM; She Sees A Therapist For This; Lexapro Was Innefective    Diverticulosis Of Sigmoid, Decending, Transverse, and Ascending Colon; W/O Diverticulitis On 9/24/18 EGD     Dr. KIRAN Heck    Fructose malabsorption     Gastroparesis     Dr. KIRAN Heck On 5/2/17 Continued Her Post Gastric Sleeve Diet    GERD With H/O Stricture Dilation     Dr. KIRAN Heck    H/O Abnormal Mammogram     F/U Mammograms And U/S Have Been Stable    H/O Abnormal Mammogram     F/U Mammograms And U/S Have Been Stable     H/O Adenomatous Colon Polyps     Dr. KIRAN Heck TC On 9/24/18 Rectal Polyps Only; "Repeat TC In 5 YRs"    H/O H. Pylori Gastritis 2009     Dr. KIRAN Heck; Received Triple Tx Then    H/O Nephrolithiasis     6/3/16 ABD/Pelvic CT W/W/O = 1 CM Right Renal Stone Only; 5/26/15 ABD/Pelvic CT With IV Contrast Only = Positive For A Nonobstructing Right Renal Stone    H/O of CABG 11/15/21     CABG X3 With [place]-Vein-Endovascular (Left)     H/O Recurrent Cyclical Nausea     Dr. KIRAN Heck    H/O Small Rectal Polyps On 9/24/18 TC; Nonbleeding, Biopsies Negative For Malignancy     Dr. Jonh Heck; "Repeat TC IN 5 YRs"    Hypercholesterolemia     On Livalo 4 Mg qHS    Hypertension     no longer taking medication since weight loss    Hypothyroidism     On [place] Thyroid    IBS (irritable bowel syndrome)     Left Hip Subcutaneous Nodule     1/16/25 Referred To Dr. Clarence Negron    Medium Internal Hemorrhoids With Rectal Bleeding On 9/24/18 EGD     Dr. KIRAN Heck; Will Schedule Clinic Visit For Hemorrhoid Banding    Mitral valve prolapse     Nonalcoholic Steatohepatitis (N.A.S.H.)     Dr. KIRAN Heck On 8/18/16 Ordered A Liver U/S And LFTs    Nonobstructive atherosclerosis of coronary artery     Obesity     Obesity S/P Sleeve Gastrectomy With Hiatal hernia Repair 10/2015     Dr. Scot Werner    Ocular Migraines     Osteoporosis     10/21/18 RXd Fosamax 70 Mg " "Weekly, And OTC D3 2K IU Daily, And OTC CaCO3 600 Mg Daily    Partial tear of subscapularis tendon, left, subsequent encounter     08/2020    Pulmonary Nodules     Dr. Luis F Evans Will Repeat This In 1 Year; 8/7/18 Low Dose Chest CT = 1 Small Nodule In Each Lung    Rectal polyp     Renal disorder     nephrolithiasis    RLQ Pain Due To Epiploic Appendagitis 08/2016     Dr. KIRAN Heck TXd This With Augmentin And Naproxen After Cipro And Flagyl Failed    Rotator cuff syndrome of left shoulder 08/2020    Rotator cuff syndrome, left 08/10/2020    Rotator cuff syndrome, right 05/10/2021    RUQ Abdominal Pain 5/31/16     6/3/16 ABD/Pelvic CT W/W/O = 1 CM Right Renal Stone Only; 5/31/16 ABD XRays = Unremarkable; 5/31/16 Increased Her PPI To Bid X 1 Week      Head trauma/Loss of consciousness: denies  Seizures: denies     PAST PSYCHIATRIC HISTORY:  Psychiatric Care (current & past):  [facility] 30 years ago  Previous Psychiatric Diagnoses:  Depression and anxiety  Previous Psychiatric Hospitalizations: IOP at [facility] 10/16/2022 through 12/21/2022  Previous SI/HI:    Denies  Previous Suicide Attempts or NSSI: denies  History of Psychotherapy:  CBT with LCSW, found helpful  History of Violence: denies    EXAM:  Constitutional  Vitals:  Most recent vital signs were reviewed.   Last 3 sets of VS:      1/30/2025    10:07 AM 2/25/2025     8:08 AM 2/27/2025    10:19 AM   Vitals - 1 value per visit   SYSTOLIC 110  84   DIASTOLIC 66  54   Pulse 69  79   Resp 18     Weight (lb) 149.03 145.5 144.51   Weight (kg) 67.6 66 65.55   Height 5' 3" (1.6 m)  5' 1" (1.549 m)   BMI (Calculated) 26.4  27.3   Pain Score Five Two Zero      General:  Unremarkable, age-appropriate     Musculoskeletal  Muscle Strength/Tone:  no tremor or abnormal movements   Gait & Station:  Steady, non-ataxic     Psychiatric  Speech:  no latency; no press   Mood & Affect:  euthymic  congruent and appropriate   Thought Process:  normal and " logical   Associations:  intact   Thought Content:  normal, no suicidality, no homicidality, delusions, or paranoia   Insight:  intact   Judgement: behavior is adequate to circumstances   Orientation:  grossly intact   Memory: intact for content of interview   Language: grossly intact   Attention Span & Concentration:  Intact to interview   Fund of Knowledge:  Not formally tested     SUICIDE RISK ASSESSMENT:  Protective factors: age, gender, no prior attempts, no prior hospitalizations, no family h/o attempts, no ongoing substance abuse, no psychosis, , has children, denies SI/intent/plan, seeking treatment, access to treatment, future oriented,   Risks:  hx depression, hx anxiety, hx IOP attendance, and access to firearms,  Patient is a moderate immediate and long-term risk considering risk factors.  Risk can be ameliorated with medication management and therapy    RELEVANT LABS/STUDIES:    Lab Results   Component Value Date    WBC 10.05 02/01/2025    HGB 12.6 02/01/2025    HCT 37.9 02/01/2025    MCV 89 02/01/2025     02/01/2025     BMP  Lab Results   Component Value Date     02/01/2025    K 3.7 02/01/2025     (H) 02/01/2025    CO2 26 02/01/2025    BUN 16 02/01/2025    CREATININE 0.90 02/01/2025    CALCIUM 9.0 02/01/2025    ANIONGAP 7 (L) 02/01/2025    ESTGFRAFRICA >60 07/07/2022    EGFRNONAA >60 07/07/2022     Lab Results   Component Value Date    ALT 57 (H) 02/01/2025    AST 26 02/01/2025    ALKPHOS 86 02/01/2025    BILITOT 1.2 (H) 02/01/2025     Lab Results   Component Value Date    TSH 0.648 02/01/2025     Lab Results   Component Value Date    LABA1C 5.3 03/29/2016    HGBA1C 4.9 02/01/2025       IMPRESSION:    Chloe Obrien is a 64 y.o. female with history of MDD, KIANA, BED, insomnia, who presents for follow up appointment.    Status/Progress: Based on the examination today, the patient's problem(s) is/are improved and well controlled.  New problems have not been presented today.    Co-morbidities are complicating management of the primary condition.  There are no active rule-out diagnoses for this patient at this time.     -Patient reports improvement in mood but notes continued irritability.  We will discontinue Wellbutrin XL and start bupropion 75 mg q.a.m. in the hope that this dose will help with mood but not cause irritability.  -Today, patient reports marked improvement in symptoms and states she feels great.  She denies side effects and would prefer to continue current management unchanged.  -marked increase in irritability.  After discussing treatment options with patient we have mutually decided to increase vilazodone to 40 mg daily to target irritability  -long discussion with patient regarding taking medications as prescribed.  Patient has been taking hydroxyzine 25 mg twice daily as opposed to p.r.n. in an effort to decrease anxiety. Through shared decision-making buspirone will be started for generalized anxiety and titrated to a therapeutic dose.  Patient did have a trial of buspirone in 2013 but the dose did not exceed 7.5 mg b.i.d.; patient requests an excuse note for 2 weeks off of work related to current anxiety and irritability as well as increased stress level regarding her job and recent physical ailments.  Note written per patient request.  -anger and irritability markedly reduced.  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  We did discuss that bupropion could be worsening this irritability, however, the patient reports she is taken this for many many years and she does not believe the bupropion is the cause of her increased irritability.  She does not wish to discontinue bupropion as she has seen significant improvement in mood with it.  We also discussed that her Unithroid dose may need to be adjusted as this could affect  irritability as well.  Patient has an appointment with her endocrinologist in 1 month.  We discussed adding an SGA such as aripiprazole for irritability however, patient does not wish to add medications at this time and she is concerned about the possibility of weight gain.  Through shared decision-making buspirone will be increased to 15 mg t.i.d. (patient is currently taking b.i.d.).  Patient agrees to contact me should her irritability worsen.  -irritability has significantly improved after increase in buspirone.  Will continue current treatment plan.  -Pt reports sad mood after the death of her father 2 weeks ago, though she denies depressed mood. She does not wish to make changes to medications at this time. She agrees to contact me should her mood worsen.   -Pt reports her mood has been stable in the interim. Anxiety is well controlled. Through shared decision making, current medication regimen will be continued.    Risk Parameters:  Patient reports no suicidal ideation  Patient reports no homicidal ideation  Patient reports no self-injurious behavior  Patient reports no violent behavior    DIAGNOSES:    ICD-10-CM ICD-9-CM   1. Recurrent major depressive disorder, in remission  F33.40 296.35   2. Generalized anxiety disorder  F41.1 300.02   3. Insomnia, unspecified type  G47.00 780.52   4. Binge eating disorder in remission  F50.814 307.50       PLAN:  Continue vilazodone 40 mg daily for mood, anxiety, and irritability  Continue bupropion 75 mg q.a.m. for mood  Continue buspirone from 15 mg t.i.d. for generalized anxiety and irritability  Continue hydroxyzine 50 mg q.h.s. p.r.n. anxiety/insomnia (Pt agrees to stop taking b.i.d. and to only take p.r.n. for severe anxiety / panic)  Continue trazodone 150 mg q.h.s. p.r.n. insomnia (residual insomnia at 100 mg dose)  Continue L-methylfolate 15 milligrams daily due to decreased MTHFR activity  Pt has discontinued regular individual psychotherapy with ERVIN Price,  LCSW, but will schedule p.r.n. if needed      RETURN TO CLINIC:   3 months      DANY Garcia, PMHNP-BC    45 minutes of total time spent on the encounter, which includes face to face time and non-face to face time preparing to see the patient (eg. review of tests), obtaining and/or reviewing separately obtained history, documenting clinical information in the electronic health record, independently interpreting results (not separately reported), and communicating results to the patient/family/caregiver, or care coordination (not separately reported).     At this time there are no indications the patient represents an imminent danger to either themselves or others; will continue to manage treatment in the outpatient setting.    I discussed the patient's care with the patient including benefits, alternatives, possible adverse effects of the treatment plan; including the potential for metabolic complications, major organ dysfunction, black box warnings, and contraindications. The opportunity was given for questions/clarification, and after this discussion the above treatment plan was devised through shared decision making. The patient voiced their understanding of the diagnoses and treatments listed above and agreed to the treatment plan. Follow up plan was reviewed with the patient. The patient was advised to call to report any worsening of symptoms or problems with medication.    Supportive therapy and psychoeducation provided. Patient has been given crisis information including Suicide and Crisis Lifeline (call or text: 932). Patient also given instructions to go to the nearest ER or call 911 if unable to remain safe or if the Pt develops thoughts of harming self or others.    Documentation entered by me for this encounter may have been done in part using [employer] voice recognition transcription software. Garbled syntax, mangled pronouns, and other bizarre constructions may be attributed to  "that software system. Although I have made an effort to ensure accuracy, "sound like" errors may exist and should be interpreted in context.    "

## 2025-03-20 ENCOUNTER — OFFICE VISIT (OUTPATIENT)
Dept: NEUROSURGERY | Facility: CLINIC | Age: 65
End: 2025-03-20
Payer: COMMERCIAL

## 2025-03-20 VITALS
HEART RATE: 64 BPM | WEIGHT: 146.19 LBS | RESPIRATION RATE: 18 BRPM | DIASTOLIC BLOOD PRESSURE: 76 MMHG | BODY MASS INDEX: 27.6 KG/M2 | HEIGHT: 61 IN | SYSTOLIC BLOOD PRESSURE: 135 MMHG

## 2025-03-20 DIAGNOSIS — M47.816 LUMBAR SPONDYLOSIS: ICD-10-CM

## 2025-03-20 DIAGNOSIS — M54.16 LUMBAR RADICULOPATHY: Primary | ICD-10-CM

## 2025-03-20 DIAGNOSIS — M54.2 CERVICALGIA: ICD-10-CM

## 2025-03-20 PROCEDURE — 3075F SYST BP GE 130 - 139MM HG: CPT | Mod: CPTII,S$GLB,, | Performed by: PHYSICIAN ASSISTANT

## 2025-03-20 PROCEDURE — 1159F MED LIST DOCD IN RCRD: CPT | Mod: CPTII,S$GLB,, | Performed by: PHYSICIAN ASSISTANT

## 2025-03-20 PROCEDURE — 4010F ACE/ARB THERAPY RXD/TAKEN: CPT | Mod: CPTII,S$GLB,, | Performed by: PHYSICIAN ASSISTANT

## 2025-03-20 PROCEDURE — 3078F DIAST BP <80 MM HG: CPT | Mod: CPTII,S$GLB,, | Performed by: PHYSICIAN ASSISTANT

## 2025-03-20 PROCEDURE — 3008F BODY MASS INDEX DOCD: CPT | Mod: CPTII,S$GLB,, | Performed by: PHYSICIAN ASSISTANT

## 2025-03-20 PROCEDURE — 3044F HG A1C LEVEL LT 7.0%: CPT | Mod: CPTII,S$GLB,, | Performed by: PHYSICIAN ASSISTANT

## 2025-03-20 PROCEDURE — 99214 OFFICE O/P EST MOD 30 MIN: CPT | Mod: S$GLB,,, | Performed by: PHYSICIAN ASSISTANT

## 2025-03-20 NOTE — PROGRESS NOTES
[facility] - Neurosurgery  Follow Up      Referred by: No ref. provider found    PCP: Jasmyn Cox MD    CC:   Chief Complaint   Patient presents with    Follow-up     Imaging f/u          HPI:   Chloe Obrien is a 64 y.o. year old female patient who has a past medical history of 1 PPD X 20+ YRs Tobacco Use Disorder, Anticoagulant long-term use, Aortic valve stenosis, Borderline Vitamin D Deficiency, Chronic Insomnia, Chronic Neck Pain, Chronic Recurrent Left Foreleg Pain, Depression, Diverticulosis Of Sigmoid, Decending, Transverse, and Ascending Colon; W/O Diverticulitis On 9/24/18 EGD, Fructose malabsorption, Gastroparesis, GERD With H/O Stricture Dilation, H/O Abnormal Mammogram, H/O Abnormal Mammogram, H/O Adenomatous Colon Polyps, H/O H. Pylori Gastritis 2009, H/O Nephrolithiasis, H/O of CABG 11/15/21, H/O Recurrent Cyclical Nausea, H/O Small Rectal Polyps On 9/24/18 TC; Nonbleeding, Biopsies Negative For Malignancy, Hypercholesterolemia, Hypertension, Hypothyroidism, IBS (irritable bowel syndrome), Left Hip Subcutaneous Nodule, Lumbar Spinal Degenerative Disc Disease, Medium Internal Hemorrhoids With Rectal Bleeding On 9/24/18 EGD, Mitral valve prolapse, Nonalcoholic Steatohepatitis (N.A.S.H.), Nonobstructive atherosclerosis of coronary artery, Obesity S/P Sleeve Gastrectomy With Hiatal hernia Repair 10/2015, Ocular Migraines, Osteoporosis, Partial tear of subscapularis tendon, left, subsequent encounter, Pulmonary Nodules, Rectal polyp, Renal disorder, RLQ Pain Due To Epiploic Appendagitis 08/2016, Rotator cuff syndrome of left shoulder, Rotator cuff syndrome, left, Rotator cuff syndrome, right, and RUQ Abdominal Pain 5/31/16. She presents for lumbar radicular pain.    Ms. Corona presents today for follow up of neck and back pain.  She was to start PT after last visit, but was not able to do other health issues and getting the flu and covid.  She continues with pain in the lower back to the left  "greater than right side and bilateral postero-lateral thighs. She continues with posterior neck pain, shoulder and interscapular region. She has had recent cervical and lumbar MRI.     HPI 1-30-25  She describes years of lower lumbar back pain with intermittent severe exacerbations where her back would "lock up".  Pain is felt daily.  It radiates from the lower lubmar ergion to the bilateral postero-lateral thighs.  It is assocaited with burning and tingling in the bilateral feet.  Pain is present with sitting and increases with walking.  She finds some relief pushing a stroller while walking. She has seen orthopedics for deep hip pain and treated with bilateral GTB injections 1-17-25 with some relief of hip region pain. She also has chronic posterior neck pain to the shoulders and interscapular region.  At times neck pain radiates to the arms.  She takes tylenol for pain.  She has tried voltaren gel as well.      Denies bowel/ bladder incontinence.    Past and current medications:  Antineuropathics:  NSAIDs: voltaren gel  Antidepressants: buspar, wellbutrin  Muscle relaxers:  Opioids:  Antiplatelets/Anticoagulants: plavix  Others: tylenol    Physical Therapy/ Chiropractic care:  Chiropractic care for neck/ lower back years ago  PT - referred but not started.     Pain Intervention History:  none    Past Spine Surgical History:  none        History:    Current Outpatient Medications:     aspirin (ECOTRIN) 81 MG EC tablet, Take 81 mg by mouth once daily., Disp: , Rfl:     atorvastatin (LIPITOR) 40 MG tablet, TAKE 1 TABLET BY MOUTH EVERY DAY, Disp: 90 tablet, Rfl: 1    azelastine (ASTELIN) 137 mcg (0.1 %) nasal spray, 2 sprays (274 mcg total) by Nasal route 2 (two) times daily., Disp: 90 mL, Rfl: 3    buPROPion (WELLBUTRIN) 75 MG tablet, TAKE 1 TABLET BY MOUTH EVERY MORNING., Disp: 90 tablet, Rfl: 1    busPIRone (BUSPAR) 15 MG tablet, Take 1 tablet (15 mg total) by mouth 2 (two) times daily., Disp: 180 tablet, Rfl: 0   "  cetirizine (ZYRTEC) 10 MG tablet, Take 10 mg by mouth once daily., Disp: , Rfl:     clopidogreL (PLAVIX) 75 mg tablet, Take 75 mg by mouth once daily., Disp: , Rfl:     cyanocobalamin 1,000 mcg/mL injection, Inject 1,000 mcg into the muscle every 30 days., Disp: , Rfl:     diclofenac sodium (VOLTAREN ARTHRITIS PAIN) 1 % Gel, Apply 2 g topically 4 (four) times daily., Disp: 100 g, Rfl: 3    ergocalciferol (ERGOCALCIFEROL) 50,000 unit Cap, TAKE 1 CAPSULE BY MOUTH ONE TIME PER WEEK, Disp: 12 capsule, Rfl: 2    ezetimibe (ZETIA) 10 mg tablet, Take 10 mg by mouth., Disp: , Rfl:     famotidine (PEPCID) 40 MG tablet, Take 40 mg by mouth nightly as needed for Heartburn., Disp: , Rfl:     fluticasone propionate (FLONASE) 50 mcg/actuation nasal spray, 2 sprays (100 mcg total) by Each Nostril route once daily., Disp: 16 g, Rfl: 11    fluticasone-umeclidin-vilanter (TRELEGY ELLIPTA) 100-62.5-25 mcg DsDv, Inhale 1 puff into the lungs once daily., Disp: 1 each, Rfl: 5    hydrOXYzine pamoate (VISTARIL) 25 MG Cap, Take 1 capsule (25 mg total) by mouth 3 (three) times daily., Disp: 270 capsule, Rfl: 0    Lactobacillus rhamnosus GG (CULTURELLE) 10 billion cell capsule, Take 1 capsule by mouth 2 (two) times a day., Disp: , Rfl:     levothyroxine (SYNTHROID) 112 MCG tablet, Take 112 mcg by mouth. Once weekly, Disp: , Rfl:     levothyroxine (SYNTHROID) 125 MCG tablet, Take 125 mcg by mouth before breakfast. 6 days weekly, Disp: , Rfl:     montelukast (SINGULAIR) 10 mg tablet, Take 1 tablet (10 mg total) by mouth every evening., Disp: 90 tablet, Rfl: 3    MOUNJARO 10 mg/0.5 mL PnIj, Inject into the skin., Disp: , Rfl:     multivit-min/ferrous fumarate (MULTI VITAMIN ORAL), Take by mouth., Disp: , Rfl:     nitroGLYCERIN (NITROSTAT) 0.4 MG SL tablet, , Disp: , Rfl:     omeprazole (PRILOSEC) 40 MG capsule, Take 1 capsule (40 mg total) by mouth 2 (two) times daily before meals., Disp: 60 capsule, Rfl: 1    ondansetron (ZOFRAN-ODT) 4 MG  TbDL, DISSOLVE 1 TABLET BY MOUTH EVERY 6 HOURS AS NEEDED FOR NAUSEA AND VOMITING, Disp: , Rfl:     propranoloL (INDERAL) 20 MG tablet, TAKE 1 TABLET BY MOUTH TWICE A DAY, Disp: 180 tablet, Rfl: 1    traZODone (DESYREL) 150 MG tablet, Take 1 tablet (150 mg total) by mouth every evening., Disp: 90 tablet, Rfl: 0    triamterene-hydrochlorothiazide 37.5-25 mg (DYAZIDE) 37.5-25 mg per capsule, Take 1 capsule by mouth once daily., Disp: , Rfl:     ubidecarenone (CO Q-10 ORAL), Take by mouth., Disp: , Rfl:     UNABLE TO FIND, UNITHYROID 125 mcg-pt takes 6 times weekly, Disp: , Rfl:     vilazodone (VIIBRYD) 40 mg Tab tablet, Take 1 tablet (40 mg total) by mouth once daily., Disp: 90 tablet, Rfl: 1    vitamin D (VITAMIN D3) 1000 units Tab, Take 1,000 Units by mouth once daily., Disp: , Rfl:     albuterol (PROAIR HFA) 90 mcg/actuation inhaler, Inhale 2 puffs into the lungs every 6 (six) hours as needed for Wheezing or Shortness of Breath. Rescue, Disp: 18 g, Rfl: 2    ALPRAZolam (XANAX) 0.25 MG tablet, Take 2 tablets 45 minutes before arriving at the radiology department and then take 1 tablet upon arrival there, can take another tablet every 30 minutes if needed while there, Disp: 5 tablet, Rfl: 0    Past Medical History:   Diagnosis Date    1 PPD X 20+ YRs Tobacco Use Disorder     11/14/18 I Advocated Smoking Cessation And RXd     Anticoagulant long-term use     Aortic valve stenosis     Borderline Vitamin D Deficiency     10/21/18 And 4/4/16 RXd OTC D3 2K Daily    Chronic Insomnia `    On Restoril 30 Mg qHS PRN, Trazodone 100 Mg qHS Caused Dizziness And Headaches    Chronic Neck Pain 03/05/2025    3/5/25 C-Spine MRI W/O Contrast = Ordered    Chronic Recurrent Left Foreleg Pain     Depression     12/21/16 Decreased Wellbutrin Further And Increased Zoloft To 100 Mg Daily; 11/8/16 Decreased Wellbutrin-XL To 300 Mg qAM And RXd Zoloft 50 Mg qAM; She Sees A Therapist For This; Lexapro Was Innefective    Diverticulosis Of  "Sigmoid, Decending, Transverse, and Ascending Colon; W/O Diverticulitis On 9/24/18 EGD     Dr. KIRAN Heck    Fructose malabsorption     Gastroparesis     Dr. KIRAN Heck On 5/2/17 Continued Her Post Gastric Sleeve Diet    GERD With H/O Stricture Dilation     Dr. KIRAN Heck    H/O Abnormal Mammogram     F/U Mammograms And U/S Have Been Stable    H/O Abnormal Mammogram     F/U Mammograms And U/S Have Been Stable     H/O Adenomatous Colon Polyps     Dr. KIRAN Heck TC On 9/24/18 Rectal Polyps Only; "Repeat TC In 5 YRs"    H/O H. Pylori Gastritis 2009     Dr. KIRAN Heck; Received Triple Tx Then    H/O Nephrolithiasis     6/3/16 ABD/Pelvic CT W/W/O = 1 CM Right Renal Stone Only; 5/26/15 ABD/Pelvic CT With IV Contrast Only = Positive For A Nonobstructing Right Renal Stone    H/O of CABG 11/15/21     CABG X3 With [place]-Vein-Endovascular (Left)     H/O Recurrent Cyclical Nausea     Dr. KIRAN Heck    H/O Small Rectal Polyps On 9/24/18 TC; Nonbleeding, Biopsies Negative For Malignancy     Dr. Jonh Heck; "Repeat TC IN 5 YRs"    Hypercholesterolemia     On Livalo 4 Mg qHS    Hypertension     no longer taking medication since weight loss    Hypothyroidism     On [place] Thyroid    IBS (irritable bowel syndrome)     Left Hip Subcutaneous Nodule     3/5/25 This Resolved On Its Own; 1/16/25 Referred To Dr. Clarence Negron    Lumbar Spinal Degenerative Disc Disease 03/05/2025    Medium Internal Hemorrhoids With Rectal Bleeding On 9/24/18 EGD     Dr. KIRAN Heck; Will Schedule Clinic Visit For Hemorrhoid Banding    Mitral valve prolapse     Nonalcoholic Steatohepatitis (N.A.S.H.)     Dr. KIRAN Heck On 8/18/16 Ordered A Liver U/S And LFTs    Nonobstructive atherosclerosis of coronary artery     Obesity S/P Sleeve Gastrectomy With Hiatal hernia Repair 10/2015     Dr. Scot Werner    Ocular Migraines     Osteoporosis     10/21/18 RXd Fosamax 70 Mg Weekly, And " OTC D3 2K IU Daily, And OTC CaCO3 600 Mg Daily    Partial tear of subscapularis tendon, left, subsequent encounter     08/2020    Pulmonary Nodules     Dr. Luis F Evans Will Repeat This In 1 Year; 8/7/18 Low Dose Chest CT = 1 Small Nodule In Each Lung    Rectal polyp     Renal disorder     nephrolithiasis    RLQ Pain Due To Epiploic Appendagitis 08/2016     Dr. KIRAN Heck TXd This With Augmentin And Naproxen After Cipro And Flagyl Failed    Rotator cuff syndrome of left shoulder 08/2020    Rotator cuff syndrome, left 08/10/2020    Rotator cuff syndrome, right 05/10/2021    RUQ Abdominal Pain 5/31/16     6/3/16 ABD/Pelvic CT W/W/O = 1 CM Right Renal Stone Only; 5/31/16 ABD XRays = Unremarkable; 5/31/16 Increased Her PPI To Bid X 1 Week       Past Surgical History:   Procedure Laterality Date    ANGIOGRAM, CORONARY, WITH LEFT HEART CATHETERIZATION N/A 11/22/2022    Procedure: ANGIOGRAM,CORONARY,WITH LEFT HEART CATHETERIZATION;  Surgeon: Zack Romero MD;  Location: [facility] CATH;  Service: Cardiology;  Laterality: N/A;    ANGIOGRAPHY OF LOWER EXTREMITY N/A 11/22/2022    Procedure: ANGIOGRAM, LOWER EXTREMITY;  Surgeon: Zack Romero MD;  Location: [facility] CATH;  Service: Cardiology;  Laterality: N/A;    ARTHROSCOPIC REPAIR OF ROTATOR CUFF OF SHOULDER Left 09/02/2020    Procedure: REPAIR, ROTATOR CUFF, ARTHROSCOPIC;  Surgeon: Clifford Kendrick II, MD;  Location: [facility] OR;  Service: Orthopedics;  Laterality: Left;    ARTHROSCOPY OF SHOULDER WITH DECOMPRESSION OF SUBACROMIAL SPACE Left 09/02/2020    Procedure: ARTHROSCOPY, SHOULDER, WITH SUBACROMIAL SPACE DECOMPRESSION;  Surgeon: Clifford Kendrick II, MD;  Location: [facility] OR;  Service: Orthopedics;  Laterality: Left;    BUNIONECTOMY      CARPAL TUNNEL RELEASE      CHOLECYSTECTOMY      COLONOSCOPY      september 2015    COLONOSCOPY N/A 11/26/2024    Procedure: COLONOSCOPY;  Surgeon: Kevin Turpin MD;  Location: [facility] ENDO;  Service: Gastroenterology;   Laterality: N/A;    CORONARY ARTERY BYPASS GRAFT (CABG) N/A 11/15/2021    Procedure: CORONARY ARTERY BYPASS GRAFT (CABG) x 3;  Surgeon: Fabian Montana MD;  Location: [address];  Service: Cardiothoracic;  Laterality: N/A;    ENDOSCOPIC HARVEST OF VEIN Left 11/15/2021    Procedure: HARVEST-VEIN-ENDOVASCULAR;  Surgeon: Fabian Montana MD;  Location: [facility] OR [address] [place] [address];  Service: Cardiothoracic;  Laterality: Left;  Vein harvest start: 1430  Vein harvest stop: 1504  Vein prep start: 1504  Vein prep stop: 1527    ESOPHAGOGASTRODUODENOSCOPY  09/15/2023    ESOPHAGOGASTRODUODENOSCOPY N/A 11/26/2024    Procedure: EGD (ESOPHAGOGASTRODUODENOSCOPY);  Surgeon: Kevin Turpin MD;  Location: [facility];  Service: Gastroenterology;  Laterality: N/A;    EXCISIONAL HEMORRHOIDECTOMY N/A 05/08/2019    Procedure: HEMORRHOIDECTOMY;  Surgeon: Jelani Leal MD;  Location: [facility] OR;  Service: General;  Laterality: N/A;    FIXATION OF TENDON Left 09/02/2020    Procedure: FIXATION, TENDON - open biceps tenodesis;  Surgeon: Cliffrod Kendrick II, MD;  Location: [facility] OR;  Service: Orthopedics;  Laterality: Left;    FLEXIBLE SIGMOIDOSCOPY Left 11/26/2018    Procedure: SIGMOIDOSCOPY, FLEXIBLE;  Surgeon: Anatoliy Simms MD;  Location: [facility] ENDO;  Service: Endoscopy;  Laterality: Left;    GASTRECTOMY      gastric sleeve    gastric sleeve  10/2015    HERNIA REPAIR      HYSTERECTOMY      total @ age 34    LEFT HEART CATHETERIZATION Left 11/09/2021    Procedure: Left heart cath;  Surgeon: Samantha Vergara MD;  Location: [facility] CATH;  Service: Cardiology;  Laterality: Left;    OOPHORECTOMY      w/Hysterectomy @ age 34    TUBAL LIGATION         Family History   Problem Relation Name Age of Onset    Stomach cancer Mother      Heart disease Father      Hypertension Father      Hypothyroidism Father      Valvular heart disease Father      Mitral valve prolapse Sister      [place]'s esophagus Sister      Diabetes Maternal Grandmother       Schizophrenia Maternal Grandfather      Heart disease Paternal Grandfather      Hypothyroidism Daughter      Hyperlipidemia Daughter      Hypothyroidism Daughter      Hyperlipidemia Daughter      Colon cancer Paternal Aunt      Breast cancer Paternal Aunt  55    Cancer Paternal Aunt      Crohn's disease Neg Hx      Ulcerative colitis Neg Hx         Social History     Socioeconomic History    Marital status:      Spouse name: Vlad    Number of children: 3   Occupational History     Employer: [employer]   Tobacco Use    Smoking status: Former     Current packs/day: 0.00     Average packs/day: 0.8 packs/day for 43.0 years (34.4 ttl pk-yrs)     Types: Cigarettes     Start date: 1977     Quit date: 2020     Years since quittin.5    Smokeless tobacco: Never    Tobacco comments:     quit 2018 restarted recently and quit 3 days ago   Substance and Sexual Activity    Alcohol use: No     Alcohol/week: 0.0 standard drinks of alcohol    Drug use: Never    Sexual activity: Yes     Birth control/protection: None, Post-menopausal, See Surgical Hx     Social Drivers of Health     Financial Resource Strain: Medium Risk (2025)    Overall Financial Resource Strain (CARDIA)     Difficulty of Paying Living Expenses: Somewhat hard   Food Insecurity: Patient Declined (2025)    Hunger Vital Sign     Worried About Running Out of Food in the Last Year: Patient declined     Ran Out of Food in the Last Year: Patient declined   Transportation Needs: No Transportation Needs (2025)    PRAPARE - Transportation     Lack of Transportation (Medical): No     Lack of Transportation (Non-Medical): No   Physical Activity: Insufficiently Active (2025)    Exercise Vital Sign     Days of Exercise per Week: 2 days     Minutes of Exercise per Session: 20 min   Stress: No Stress Concern Present (2025)    [employer] - Occupational Stress Questionnaire     Feeling of  "Stress : Only a little   Recent Concern: Stress - Stress Concern Present (2/13/2025)    Received from [employer] [place]    [employer] - Occupational Stress Questionnaire     Feeling of Stress : To some extent   Housing Stability: Patient Declined (2/24/2025)    Housing Stability Vital Sign     Unable to Pay for Housing in the Last Year: Patient declined     Homeless in the Last Year: Patient declined       Review of patient's allergies indicates:   Allergen Reactions    Keflex [cephalexin] Swelling     Other reaction(s): throat swelilng    Levofloxacin Diarrhea       Labs:  Lab Results   Component Value Date    LABA1C 5.3 03/29/2016    HGBA1C 4.9 02/01/2025       Lab Results   Component Value Date    WBC 10.05 02/01/2025    HGB 12.6 02/01/2025    HCT 37.9 02/01/2025    MCV 89 02/01/2025     02/01/2025           Review of Systems:  Lower back pain.  Hip and leg pain.  Neck pain.  Balance of review of systems is negative.    Physical Exam:  Vitals:    03/20/25 0909   BP: 135/76   Pulse: 64   Resp: 18   Weight: 66.3 kg (146 lb 2.6 oz)   Height: 5' 1" (1.549 m)   PainSc:   4   PainLoc: Neck     Body mass index is 27.62 kg/m².    Pain disability index:       No data to display                Gen: NAD  Psych: mood appropriate for given condition  HEENT: eyes anicteric   CV: RRR, 2+ radial pulse  Respiratory: non-labored, no signs of respiratory distress  Abd: non-distended  Skin: warm, dry and intact.  Gait: Able to heel walk, toe walk. No antalgic gait.       Cervical spine: ROM is full in flexion, extension and lateral rotation without increased pain.  Spurling's maneuver causes no neck pain to either side.  Myofascial exam: No Tenderness to palpation across cervical paraspinous region bilaterally.    Lumbar spine:  Lumbar spine: ROM is full with flexion extension and oblique extension with no increased pain.    Fabian's test causes no increased pain on either " side.    Supine straight leg raise is positive for buttock pain bilaterally.  Internal and external rotation of the hip causes no increased pain on either side.  Myofascial exam: No tenderness to palpation across lumbar paraspinous muscles. No tenderness to palpation over the bilateral greater trochanters and bilateral SI joint    Sensory:  Intact and symmetrical to light touch in C4-T1 dermatomes bilaterally. Intact and symmetrical to light touch in L1-S1 dermatomes bilaterally.    Motor:    Right Left   C4 Shoulder Abduction  5  5   C5 Elbow Flexion    5  5   C6 Wrist Extension  5  5   C7 Elbow Extension   5  5   C8/T1 Hand Intrinsics   5  5        Right Left   L2/3 Iliacus Hip flexion  5  5   L3/4 Qudratus Femoris Knee Extension  5  5   L4/5 Tib Anterior Ankle Dorsiflexion   5  5   L5/S1 Extensor Hallicus Longus Great toe extension  5  5   S1/S2 Gastroc/Soleus Plantar Flexion  5  5      Right Left   Triceps DTR 2+ 2+   Biceps DTR 2+ 2+   Brachioradialis DTR 2+ 2+   Patellar DTR 2+ 2+   Achilles DTR 2+ 2+   Ness Absent  Absent   Clonus Absent Absent   Babinski Absent Absent       Imaging:    MRI cervical spine 3/13/25:  FINDINGS:  The bone marrow signal intensity is heterogeneous with Modic endplate changes present about the C4, C5, and C6 vertebral bodies.  Cervical spinal alignment is satisfactory.  There is loss of cervical disc height with cervical disc desiccation at all levels throughout the cervical spine.     The craniocervical junction is unremarkable.     At the C2-3 disc level, the posterior disc contour is smooth.  The central spinal canal and neural foramina patent.  At the C3-4 disc level, there is a broad-based cervical disc bulge which contacts the ventral aspect of the thecal sac.  No central spinal canal or foraminal stenosis.  At the C4-5 disc level, there is a broad-based cervical disc bulge with a superimposed midline 3 mm cervical disc protrusion, image 22 of series 19.  This contacts the  ventral aspect of the thecal sac.  The central spinal canal and neural foramina are patent.  At the C5-6 disc level, there is an asymmetric broad-based cervical disc bulge with mild bilateral facet arthrosis present.  No central spinal canal or foraminal stenosis.  At the C6-7 disc level, there is uncovertebral joint hypertrophy and facet arthrosis, right greater than left.  The posterior disc contour is smooth.  No central spinal canal or foraminal stenosis.     Impression:  1. Abnormal exam demonstrating 3 mm midline cervical disc protrusion at C4-5, without associated central spinal canal or foraminal stenosis.  2. Broad-based cervical disc bulge at C3-4 and C5-6, without central spinal canal or foraminal stenosis.    MRI lumbar spine 3/13/25:  FINDINGS:  Bone marrow signal intensity is heterogeneous with Modic endplate changes noted throughout the lumbar spine.  In addition, there are linear abnormal increased signal foci along the inferior endplate of the L3 vertebral body and superior endplate of the L4 vertebral body, best identified on image 6 of series 19,.  We have no history of trauma in the absence of acute trauma, endplate osteoporotic microfracture would be in the differential.     The conus terminates at the L1 vertebral body level.  There is loss of lumbar disc height at all levels throughout the lumbar spine with lumbar disc desiccation present.  The paraspinous soft tissues demonstrate nothing unusual.     At the L1-2 disc level, the posterior disc contour is smooth.  No central spinal canal or foraminal stenosis.  At the L2-3 disc level, there is an asymmetric broad-based lumbar disc bulge which flattens the ventral aspect of the thecal sac.  There is mild bilateral facet arthrosis present.  No central spinal canal or foraminal stenosis.  At the L3-4 disc level, there is a broad-based lumbar disc bulge with a far right lateral canal component present.  There is ligamentum flavum hypertrophy and  facet arthrosis present.  No central spinal canal or foraminal stenosis.  At the L4-5 disc level, there is a broad-based lumbar disc bulge with a far right lateral canal lumbar disc protrusion, image 8 of series 17, measuring 9 mm in in AP dimension, and contacting and displacing the exiting right L4 nerve root, image 30 of series 20.  The disc bulge contacts the ventral aspect of the thecal sac.  There is right L4-5 foraminal stenosis without central spinal canal stenosis.  At the L5-S1 disc level, there is an asymmetric broad-based lumbar disc bulge with a far right lateral canal component noted.  This displaces the exiting right L5 nerve root laterally, image 34 of series 20 as demarcated by the arrow.  There is mild right L5-S1 foraminal stenosis with ligamentum flavum hypertrophy and facet arthrosis present.  Note is made of a Tarlov cyst at the level of the sacral spinal canal dorsal to the S1 nerve root sheath, image 38 of series 20.     Impression:  1. Acute bone marrow edema along the endplates of the L3 and L4 vertebral body adjacent to an abnormal desiccated L3-4 lumbar disc.  Osteoporotic microfracture would be in the differential.  Degenerative disc disease would also be in the differential.  2. Far right lateral canal lumbar disc protrusion at L4-5 with right L4-5 foraminal stenosis as discussed above.  3. Asymmetric broad-based lumbar disc bulge at L5-S1 with displacement of the right L5 nerve root laterally.    Assessment:   Chloe Obrien is a 64 y.o. year old female patient who has a past medical history of 1 PPD X 20+ YRs Tobacco Use Disorder, Anticoagulant long-term use, Aortic valve stenosis, Borderline Vitamin D Deficiency, Chronic Insomnia, Chronic Neck Pain, Chronic Recurrent Left Foreleg Pain, Depression, Diverticulosis Of Sigmoid, Decending, Transverse, and Ascending Colon; W/O Diverticulitis On 9/24/18 EGD, Fructose malabsorption, Gastroparesis, GERD With H/O Stricture Dilation, H/O  Abnormal Mammogram, H/O Abnormal Mammogram, H/O Adenomatous Colon Polyps, H/O H. Pylori Gastritis 2009, H/O Nephrolithiasis, H/O of CABG 11/15/21, H/O Recurrent Cyclical Nausea, H/O Small Rectal Polyps On 9/24/18 TC; Nonbleeding, Biopsies Negative For Malignancy, Hypercholesterolemia, Hypertension, Hypothyroidism, IBS (irritable bowel syndrome), Left Hip Subcutaneous Nodule, Lumbar Spinal Degenerative Disc Disease, Medium Internal Hemorrhoids With Rectal Bleeding On 9/24/18 EGD, Mitral valve prolapse, Nonalcoholic Steatohepatitis ([place] Nonobstructive atherosclerosis of coronary artery, Obesity S/P Sleeve Gastrectomy With Hiatal hernia Repair 10/2015, Ocular Migraines, Osteoporosis, Partial tear of subscapularis tendon, left, subsequent encounter, Pulmonary Nodules, Rectal polyp, Renal disorder, RLQ Pain Due To Epiploic Appendagitis 08/2016, Rotator cuff syndrome of left shoulder, Rotator cuff syndrome, left, Rotator cuff syndrome, right, and RUQ Abdominal Pain 5/31/16. She presents neck pain and for lumbar radiculoapthy.    She has neck pain without any current radicuar symptoms.  MRI of the neck shows some normal age appropriate degenerative chagne without foraminal narrowing.  Most likely myofascial neck pain.    Lower back pain with lumbar radiculoapthy due to degenerative changes at Lt L4/5, L5/S1 the greatest with some foraminal narrowing.  No neurological deficits.  .    Plan:  - will continue with tylenol for pain.  She is taking plavix and cannot tolerate nsaids at this time.  - recommend starting PT and she plans to schedule.  - if no improvement with PT, can consider L5/S1 [place] FRANCISCO for lower back and leg pain.  With relatively normal MRI of the cervical spine, there are no interventional procedures or surgical interventions recommended at this time.  She is not anxious for FRANCISCO or surgery for the neck or lower back.     Problem List Items Addressed This Visit    None  Visit Diagnoses         Lumbar  radiculopathy    -  Primary      Cervicalgia          Lumbar spondylosis                  Follow Up: RTC in 6-8 weeks    : Reviewed and consistent with medication use as prescribed.    Thank you for referring this interesting patient, and I look forward to continuing to collaborate in her care.        Candelaria Appiah PA-C  [facility]

## 2025-05-01 ENCOUNTER — OFFICE VISIT (OUTPATIENT)
Dept: NEUROSURGERY | Facility: CLINIC | Age: 65
End: 2025-05-01
Payer: COMMERCIAL

## 2025-05-01 VITALS
SYSTOLIC BLOOD PRESSURE: 124 MMHG | HEIGHT: 61 IN | DIASTOLIC BLOOD PRESSURE: 68 MMHG | HEART RATE: 63 BPM | RESPIRATION RATE: 18 BRPM | WEIGHT: 146.19 LBS | BODY MASS INDEX: 27.6 KG/M2

## 2025-05-01 DIAGNOSIS — M54.16 LUMBAR RADICULOPATHY: Primary | ICD-10-CM

## 2025-05-01 DIAGNOSIS — M47.816 LUMBAR SPONDYLOSIS: ICD-10-CM

## 2025-05-01 DIAGNOSIS — M54.2 CERVICALGIA: ICD-10-CM

## 2025-05-01 PROCEDURE — 3078F DIAST BP <80 MM HG: CPT | Mod: CPTII,S$GLB,, | Performed by: PHYSICIAN ASSISTANT

## 2025-05-01 PROCEDURE — 3008F BODY MASS INDEX DOCD: CPT | Mod: CPTII,S$GLB,, | Performed by: PHYSICIAN ASSISTANT

## 2025-05-01 PROCEDURE — 99213 OFFICE O/P EST LOW 20 MIN: CPT | Mod: S$GLB,,, | Performed by: PHYSICIAN ASSISTANT

## 2025-05-01 PROCEDURE — 3074F SYST BP LT 130 MM HG: CPT | Mod: CPTII,S$GLB,, | Performed by: PHYSICIAN ASSISTANT

## 2025-05-01 PROCEDURE — 1159F MED LIST DOCD IN RCRD: CPT | Mod: CPTII,S$GLB,, | Performed by: PHYSICIAN ASSISTANT

## 2025-05-01 PROCEDURE — 3044F HG A1C LEVEL LT 7.0%: CPT | Mod: CPTII,S$GLB,, | Performed by: PHYSICIAN ASSISTANT

## 2025-05-01 PROCEDURE — 4010F ACE/ARB THERAPY RXD/TAKEN: CPT | Mod: CPTII,S$GLB,, | Performed by: PHYSICIAN ASSISTANT

## 2025-05-01 PROCEDURE — 1160F RVW MEDS BY RX/DR IN RCRD: CPT | Mod: CPTII,S$GLB,, | Performed by: PHYSICIAN ASSISTANT

## 2025-05-01 RX ORDER — ROSUVASTATIN CALCIUM 20 MG/1
20 TABLET, COATED ORAL NIGHTLY
COMMUNITY

## 2025-05-01 RX ORDER — OLMESARTAN MEDOXOMIL 20 MG/1
TABLET ORAL
COMMUNITY
Start: 2025-04-30

## 2025-05-01 NOTE — PROGRESS NOTES
[facility] - Neurosurgery  Follow Up        PCP: Jasmyn Cox MD    CC:   Chief Complaint   Patient presents with    Follow-up     6 week f/u, lumbar          HPI:   Chloe Obrien is a 64 y.o. year old female patient who has a past medical history of 1 PPD X 20+ YRs Tobacco Use Disorder, Anticoagulant long-term use, Aortic valve stenosis, Borderline Vitamin D Deficiency, Cervical Spinal Degenerative Disc Disease, Chronic Insomnia, Chronic Neck Pain, Chronic Recurrent Left Foreleg Pain, Depression, Diverticulosis Of Sigmoid, Decending, Transverse, and Ascending Colon; W/O Diverticulitis On 9/24/18 EGD, Fructose malabsorption, Gastroparesis, GERD With H/O Stricture Dilation, H/O Abnormal Mammogram, H/O Abnormal Mammogram, H/O Adenomatous Colon Polyps, H/O H. Pylori Gastritis 2009, H/O Nephrolithiasis, H/O of CABG 11/15/21, H/O Recurrent Cyclical Nausea, H/O Small Rectal Polyps On 9/24/18 TC; Nonbleeding, Biopsies Negative For Malignancy, Hypercholesterolemia, Hypertension, Hypothyroidism, IBS (irritable bowel syndrome), Left Hip Subcutaneous Nodule, Lumbar Spinal Degenerative Disc Disease, Medium Internal Hemorrhoids With Rectal Bleeding On 9/24/18 EGD, Mitral valve prolapse, Nonalcoholic Steatohepatitis (N.A.S.H.), Nonobstructive atherosclerosis of coronary artery, Obesity S/P Sleeve Gastrectomy With Hiatal hernia Repair 10/2015, Ocular Migraines, Osteoporosis, Partial tear of subscapularis tendon, left, subsequent encounter, Pulmonary Nodules, Rectal polyp, Renal disorder, RLQ Pain Due To Epiploic Appendagitis 08/2016, Rotator cuff syndrome of left shoulder, Rotator cuff syndrome, left, Rotator cuff syndrome, right, and RUQ Abdominal Pain 5/31/16. She presents for lumbar radicular pain.    Ms. Corona presents today for follow up of neck and back pain.  She has been going to PT.  She continues with neck and back pain, but believes the massages with PT have helped and feel good.  She continues with pain in  "the lower back to the left greater than right side and bilateral postero-lateral thighs. She continues with posterior neck pain, shoulder and interscapular region. Pain is managable and she is used to dealing with it; she is not sure she wants any further treatment with injections at this time.     HPI 3-20-25:  Ms. Corona presents today for follow up of neck and back pain.  She was to start PT after last visit, but was not able to do other health issues and getting the flu and covid.  She continues with pain in the lower back to the left greater than right side and bilateral postero-lateral thighs. She continues with posterior neck pain, shoulder and interscapular region. She has had recent cervical and lumbar MRI.     HPI 1-30-25  She describes years of lower lumbar back pain with intermittent severe exacerbations where her back would "lock up".  Pain is felt daily.  It radiates from the lower lubmar ergion to the bilateral postero-lateral thighs.  It is assocaited with burning and tingling in the bilateral feet.  Pain is present with sitting and increases with walking.  She finds some relief pushing a stroller while walking. She has seen orthopedics for deep hip pain and treated with bilateral GTB injections 1-17-25 with some relief of hip region pain. She also has chronic posterior neck pain to the shoulders and interscapular region.  At times neck pain radiates to the arms.  She takes tylenol for pain.  She has tried voltaren gel as well.      Denies bowel/ bladder incontinence.    Past and current medications:  Antineuropathics:  NSAIDs: voltaren gel  Antidepressants: buspar, wellbutrin  Muscle relaxers:  Opioids:  Antiplatelets/Anticoagulants: plavix  Others: tylenol    Physical Therapy/ Chiropractic care:  Chiropractic care for neck/ lower back years ago  PT - undergoing    Pain Intervention History:  none    Past Spine Surgical History:  none        History:    Current Outpatient Medications:     aspirin " (ECOTRIN) 81 MG EC tablet, Take 81 mg by mouth once daily., Disp: , Rfl:     azelastine (ASTELIN) 137 mcg (0.1 %) nasal spray, 2 sprays (274 mcg total) by Nasal route 2 (two) times daily., Disp: 90 mL, Rfl: 3    buPROPion (WELLBUTRIN) 75 MG tablet, TAKE 1 TABLET BY MOUTH EVERY MORNING., Disp: 90 tablet, Rfl: 1    busPIRone (BUSPAR) 15 MG tablet, Take 1 tablet (15 mg total) by mouth 2 (two) times daily., Disp: 180 tablet, Rfl: 0    cetirizine (ZYRTEC) 10 MG tablet, Take 10 mg by mouth once daily., Disp: , Rfl:     cyanocobalamin 1,000 mcg/mL injection, Inject 1,000 mcg into the muscle every 30 days., Disp: , Rfl:     diclofenac sodium (VOLTAREN ARTHRITIS PAIN) 1 % Gel, Apply 2 g topically 4 (four) times daily., Disp: 100 g, Rfl: 3    ergocalciferol (ERGOCALCIFEROL) 50,000 unit Cap, TAKE 1 CAPSULE BY MOUTH ONE TIME PER WEEK, Disp: 12 capsule, Rfl: 2    ezetimibe (ZETIA) 10 mg tablet, Take 10 mg by mouth., Disp: , Rfl:     famotidine (PEPCID) 40 MG tablet, Take 40 mg by mouth nightly as needed for Heartburn., Disp: , Rfl:     fluticasone propionate (FLONASE) 50 mcg/actuation nasal spray, 2 sprays (100 mcg total) by Each Nostril route once daily., Disp: 16 g, Rfl: 11    fluticasone-umeclidin-vilanter (TRELEGY ELLIPTA) 100-62.5-25 mcg DsDv, Inhale 1 puff into the lungs once daily., Disp: 1 each, Rfl: 5    hydrOXYzine pamoate (VISTARIL) 25 MG Cap, Take 1 capsule (25 mg total) by mouth 3 (three) times daily., Disp: 270 capsule, Rfl: 0    Lactobacillus rhamnosus GG (CULTURELLE) 10 billion cell capsule, Take 1 capsule by mouth 2 (two) times a day., Disp: , Rfl:     levothyroxine (SYNTHROID) 112 MCG tablet, Take 112 mcg by mouth. Once weekly, Disp: , Rfl:     levothyroxine (SYNTHROID) 125 MCG tablet, Take 125 mcg by mouth before breakfast. 6 days weekly, Disp: , Rfl:     montelukast (SINGULAIR) 10 mg tablet, Take 1 tablet (10 mg total) by mouth every evening., Disp: 90 tablet, Rfl: 3    MOUNJARO 10 mg/0.5 mL PnIj, Inject into  the skin., Disp: , Rfl:     multivit-min/ferrous fumarate (MULTI VITAMIN ORAL), Take by mouth., Disp: , Rfl:     nitroGLYCERIN (NITROSTAT) 0.4 MG SL tablet, , Disp: , Rfl:     olmesartan (BENICAR) 20 MG tablet, Take by mouth., Disp: , Rfl:     omeprazole (PRILOSEC) 40 MG capsule, Take 1 capsule (40 mg total) by mouth 2 (two) times daily before meals., Disp: 60 capsule, Rfl: 1    ondansetron (ZOFRAN-ODT) 4 MG TbDL, DISSOLVE 1 TABLET BY MOUTH EVERY 6 HOURS AS NEEDED FOR NAUSEA AND VOMITING, Disp: , Rfl:     propranoloL (INDERAL) 20 MG tablet, TAKE 1 TABLET BY MOUTH TWICE A DAY, Disp: 180 tablet, Rfl: 1    rosuvastatin (CRESTOR) 20 MG tablet, Take 20 mg by mouth every evening., Disp: , Rfl:     traZODone (DESYREL) 150 MG tablet, Take 1 tablet (150 mg total) by mouth every evening., Disp: 90 tablet, Rfl: 0    ubidecarenone (CO Q-10 ORAL), Take by mouth., Disp: , Rfl:     UNABLE TO FIND, UNITHYROID 125 mcg-pt takes 6 times weekly, Disp: , Rfl:     vilazodone (VIIBRYD) 40 mg Tab tablet, Take 1 tablet (40 mg total) by mouth once daily., Disp: 90 tablet, Rfl: 1    vitamin D (VITAMIN D3) 1000 units Tab, Take 1,000 Units by mouth once daily., Disp: , Rfl:     albuterol (PROAIR HFA) 90 mcg/actuation inhaler, Inhale 2 puffs into the lungs every 6 (six) hours as needed for Wheezing or Shortness of Breath. Rescue, Disp: 18 g, Rfl: 2    ALPRAZolam (XANAX) 0.25 MG tablet, Take 2 tablets 45 minutes before arriving at the radiology department and then take 1 tablet upon arrival there, can take another tablet every 30 minutes if needed while there, Disp: 5 tablet, Rfl: 0    atorvastatin (LIPITOR) 40 MG tablet, TAKE 1 TABLET BY MOUTH EVERY DAY, Disp: 90 tablet, Rfl: 1    clopidogreL (PLAVIX) 75 mg tablet, Take 75 mg by mouth once daily., Disp: , Rfl:     triamterene-hydrochlorothiazide 37.5-25 mg (DYAZIDE) 37.5-25 mg per capsule, Take 1 capsule by mouth once daily., Disp: , Rfl:     Past Medical History:   Diagnosis Date    1 PPD X 20+  "YRs Tobacco Use Disorder     11/14/18 I Advocated Smoking Cessation And RXd     Anticoagulant long-term use     Aortic valve stenosis     Borderline Vitamin D Deficiency     10/21/18 And 4/4/16 RXd OTC D3 2K Daily    Cervical Spinal Degenerative Disc Disease     3/13/25 C-Spine MRI W/O Contrast = (See Report)    Chronic Insomnia `    On Restoril 30 Mg qHS PRN, Trazodone 100 Mg qHS Caused Dizziness And Headaches    Chronic Neck Pain     3/13/25 C-Spine MRI W/O Contrast = (See Report)    Chronic Recurrent Left Foreleg Pain 03/20/2025 1/17/25 Dr. Jean Mascorro Referred To Dr. Surya Enriquez; 3/13/25 L-Spine MRI W/O Contrast = (See Report)    Depression     12/21/16 Decreased Wellbutrin Further And Increased Zoloft To 100 Mg Daily; 11/8/16 Decreased Wellbutrin-XL To 300 Mg qAM And RXd Zoloft 50 Mg qAM; She Sees A Therapist For This; Lexapro Was Innefective    Diverticulosis Of Sigmoid, Decending, Transverse, and Ascending Colon; W/O Diverticulitis On 9/24/18 EGD     Dr. KIRAN Heck    Fructose malabsorption     Gastroparesis     Dr. KIRAN Heck On 5/2/17 Continued Her Post Gastric Sleeve Diet    GERD With H/O Stricture Dilation     Dr. KIRAN Heck    H/O Abnormal Mammogram     F/U Mammograms And U/S Have Been Stable    H/O Abnormal Mammogram     F/U Mammograms And U/S Have Been Stable     H/O Adenomatous Colon Polyps     Dr. KIRAN Heck TC On 9/24/18 Rectal Polyps Only; "Repeat TC In 5 YRs"    H/O H. Pylori Gastritis 2009     Dr. KIRAN Heck; Received Triple Tx Then    H/O Nephrolithiasis     6/3/16 ABD/Pelvic CT W/W/O = 1 CM Right Renal Stone Only; 5/26/15 ABD/Pelvic CT With IV Contrast Only = Positive For A Nonobstructing Right Renal Stone    H/O of CABG 11/15/21     CABG X3 With Rosalie-Vein-Endovascular (Left)     H/O Recurrent Cyclical Nausea     Dr. KIRAN Heck    H/O Small Rectal Polyps On 9/24/18 TC; Nonbleeding, Biopsies Negative For Malignancy     Dr. Borja " "Umang; "Repeat TC IN 5 YRs"    Hypercholesterolemia     On Livalo 4 Mg qHS    Hypertension     no longer taking medication since weight loss    Hypothyroidism     On [place] Thyroid    IBS (irritable bowel syndrome)     Left Hip Subcutaneous Nodule     3/5/25 This Resolved On Its Own; 1/16/25 Referred To Dr. Clarence Negron    Lumbar Spinal Degenerative Disc Disease 03/20/2025 1/17/25 Dr. Jean Mascorro Referred To Dr. Surya Enriquez; 3/13/25 L-Spine MRI W/O Contrast = (See Report)    Medium Internal Hemorrhoids With Rectal Bleeding On 9/24/18 EGD     Dr. KIRAN Heck; Will Schedule Clinic Visit For Hemorrhoid Banding    Mitral valve prolapse     Nonalcoholic Steatohepatitis (N.A.S.H.)     Dr. KIRAN Heck On 8/18/16 Ordered A Liver U/S And LFTs    Nonobstructive atherosclerosis of coronary artery     Obesity S/P Sleeve Gastrectomy With Hiatal hernia Repair 10/2015     Dr. Scot Werner    Ocular Migraines     Osteoporosis     10/21/18 RXd Fosamax 70 Mg Weekly, And OTC D3 2K IU Daily, And OTC CaCO3 600 Mg Daily    Partial tear of subscapularis tendon, left, subsequent encounter     08/2020    Pulmonary Nodules     Dr. Luis F Evans Will Repeat This In 1 Year; 8/7/18 Low Dose Chest CT = 1 Small Nodule In Each Lung    Rectal polyp     Renal disorder     nephrolithiasis    RLQ Pain Due To Epiploic Appendagitis 08/2016     Dr. KIRAN Heck TXd This With Augmentin And Naproxen After Cipro And Flagyl Failed    Rotator cuff syndrome of left shoulder 08/2020    Rotator cuff syndrome, left 08/10/2020    Rotator cuff syndrome, right 05/10/2021    RUQ Abdominal Pain 5/31/16     6/3/16 ABD/Pelvic CT W/W/O = 1 CM Right Renal Stone Only; 5/31/16 ABD XRays = Unremarkable; 5/31/16 Increased Her PPI To Bid X 1 Week       Past Surgical History:   Procedure Laterality Date    ANGIOGRAM, CORONARY, WITH LEFT HEART CATHETERIZATION N/A 11/22/2022    Procedure: ANGIOGRAM,CORONARY,WITH LEFT HEART CATHETERIZATION;  Surgeon: " Zack Romero MD;  Location: [facility] CATH;  Service: Cardiology;  Laterality: N/A;    ANGIOGRAPHY OF LOWER EXTREMITY N/A 11/22/2022    Procedure: ANGIOGRAM, LOWER EXTREMITY;  Surgeon: Zack Romero MD;  Location: [facility] CATH;  Service: Cardiology;  Laterality: N/A;    ARTHROSCOPIC REPAIR OF ROTATOR CUFF OF SHOULDER Left 09/02/2020    Procedure: REPAIR, ROTATOR CUFF, ARTHROSCOPIC;  Surgeon: Clifford Kendrick II, MD;  Location: [facility] OR;  Service: Orthopedics;  Laterality: Left;    ARTHROSCOPY OF SHOULDER WITH DECOMPRESSION OF SUBACROMIAL SPACE Left 09/02/2020    Procedure: ARTHROSCOPY, SHOULDER, WITH SUBACROMIAL SPACE DECOMPRESSION;  Surgeon: Clifford Kendrick II, MD;  Location: [facility] OR;  Service: Orthopedics;  Laterality: Left;    BUNIONECTOMY      CARPAL TUNNEL RELEASE      CHOLECYSTECTOMY      COLONOSCOPY      september 2015    COLONOSCOPY N/A 11/26/2024    Procedure: COLONOSCOPY;  Surgeon: Kevin Turpin MD;  Location: [facility] ENDO;  Service: Gastroenterology;  Laterality: N/A;    CORONARY ARTERY BYPASS GRAFT (CABG) N/A 11/15/2021    Procedure: CORONARY ARTERY BYPASS GRAFT (CABG) x 3;  Surgeon: Fabian Montana MD;  Location: [facility] OR [address] [place] [address];  Service: Cardiothoracic;  Laterality: N/A;    ENDOSCOPIC HARVEST OF VEIN Left 11/15/2021    Procedure: HARVEST-VEIN-ENDOVASCULAR;  Surgeon: Fabian Montana MD;  [facility];  Service: Cardiothoracic;  Laterality: Left;  Vein harvest start: 1430  Vein harvest stop: 1504  Vein prep start: 1504  Vein prep stop: 1527    ESOPHAGOGASTRODUODENOSCOPY  09/15/2023    ESOPHAGOGASTRODUODENOSCOPY N/A 11/26/2024    Procedure: EGD (ESOPHAGOGASTRODUODENOSCOPY);  Surgeon: Kevin Turpin MD;  Location: [facility] ENDO;  Service: Gastroenterology;  Laterality: N/A;    EXCISIONAL HEMORRHOIDECTOMY N/A 05/08/2019    Procedure: HEMORRHOIDECTOMY;  Surgeon: Jelani Leal MD;  Location: [facility] OR;  Service: General;  Laterality: N/A;    FIXATION OF TENDON Left 09/02/2020     Procedure: FIXATION, TENDON - open biceps tenodesis;  Surgeon: Clifford Kendrick II, MD;  Location: [facility] OR;  Service: Orthopedics;  Laterality: Left;    FLEXIBLE SIGMOIDOSCOPY Left 2018    Procedure: SIGMOIDOSCOPY, FLEXIBLE;  Surgeon: Anatoliy Simms MD;  Location: [facility] ENDO;  Service: Endoscopy;  Laterality: Left;    GASTRECTOMY      gastric sleeve    gastric sleeve  10/2015    HERNIA REPAIR      HYSTERECTOMY      total @ age 34    LEFT HEART CATHETERIZATION Left 2021    Procedure: Left heart cath;  Surgeon: Samantha Vergara MD;  Location: [facility] CATH;  Service: Cardiology;  Laterality: Left;    OOPHORECTOMY      w/Hysterectomy @ age 34    TUBAL LIGATION         Family History   Problem Relation Name Age of Onset    Stomach cancer Mother      Heart disease Father      Hypertension Father      Hypothyroidism Father      Valvular heart disease Father      Mitral valve prolapse Sister      [place]'s esophagus Sister      Colon cancer Brother      Hypothyroidism Daughter      Hyperlipidemia Daughter      Hypothyroidism Daughter      Hyperlipidemia Daughter      Pancreatic cancer Maternal Uncle      Colon cancer Paternal Aunt      Breast cancer Paternal Aunt  55    Cancer Paternal Aunt      Diabetes Maternal Grandmother      Schizophrenia Maternal Grandfather      Heart disease Paternal Grandfather      Crohn's disease Neg Hx      Ulcerative colitis Neg Hx         Social History     Socioeconomic History    Marital status:      Spouse name: Vlad    Number of children: 3   Occupational History     Employer: [employer]   Tobacco Use    Smoking status: Former     Current packs/day: 0.00     Average packs/day: 0.8 packs/day for 43.0 years (34.4 ttl pk-yrs)     Types: Cigarettes     Start date: 1977     Quit date: 2020     Years since quittin.6    Smokeless tobacco: Never    Tobacco comments:     quit 2018 restarted recently and quit 3 days ago   Substance and Sexual  Activity    Alcohol use: No     Alcohol/week: 0.0 standard drinks of alcohol    Drug use: Never    Sexual activity: Yes     Birth control/protection: None, Post-menopausal, See Surgical Hx     Social Drivers of Health     Financial Resource Strain: Medium Risk (2/24/2025)    Overall Financial Resource Strain (CARDIA)     Difficulty of Paying Living Expenses: Somewhat hard   Food Insecurity: Patient Declined (2/24/2025)    Hunger Vital Sign     Worried About Running Out of Food in the Last Year: Patient declined     Ran Out of Food in the Last Year: Patient declined   Transportation Needs: No Transportation Needs (2/24/2025)    PRAPARE - Transportation     Lack of Transportation (Medical): No     Lack of Transportation (Non-Medical): No   Physical Activity: Insufficiently Active (2/24/2025)    Exercise Vital Sign     Days of Exercise per Week: 2 days     Minutes of Exercise per Session: 20 min   Stress: No Stress Concern Present (2/24/2025)    [employer] - Occupational Stress Questionnaire     Feeling of Stress : Only a little   Recent Concern: Stress - Stress Concern Present (2/13/2025)    Received from [employer] [place]    [employer] - Occupational Stress Questionnaire     Feeling of Stress : To some extent   Housing Stability: Patient Declined (2/24/2025)    Housing Stability Vital Sign     Unable to Pay for Housing in the Last Year: Patient declined     Homeless in the Last Year: Patient declined       Review of patient's allergies indicates:   Allergen Reactions    Keflex [cephalexin] Swelling     Other reaction(s): throat swelilng    Levofloxacin Diarrhea       Labs:  Lab Results   Component Value Date    LABA1C 5.3 03/29/2016    HGBA1C 4.9 02/01/2025       Lab Results   Component Value Date    WBC 10.05 02/01/2025    HGB 12.6 02/01/2025    HCT 37.9 02/01/2025    MCV 89 02/01/2025     02/01/2025           Review of  "Systems:  Lower back pain.  Hip and leg pain.  Neck pain.  Balance of review of systems is negative.    Physical Exam:  Vitals:    05/01/25 1026   BP: 124/68   Pulse: 63   Resp: 18   Weight: 66.3 kg (146 lb 2.6 oz)   Height: 5' 1" (1.549 m)   PainSc:   3   PainLoc: Neck     Body mass index is 27.62 kg/m².    Pain disability index:       No data to display                Gen: NAD  Psych: mood appropriate for given condition  HEENT: eyes anicteric   CV: RRR, 2+ radial pulse  Respiratory: non-labored, no signs of respiratory distress  Abd: non-distended  Skin: warm, dry and intact.  Gait: Able to heel walk, toe walk. No antalgic gait.     Cervical spine: ROM is full in flexion, extension and lateral rotation without increased pain.  Spurling's maneuver causes no neck pain to either side.  Myofascial exam: No Tenderness to palpation across cervical paraspinous region bilaterally.    Lumbar spine:  Lumbar spine: ROM is full with flexion extension and oblique extension with no increased pain.    Fabian's test causes no increased pain on either side.    Supine straight leg raise is positive for buttock pain bilaterally.  Internal and external rotation of the hip causes no increased pain on either side.  Myofascial exam: No tenderness to palpation across lumbar paraspinous muscles. No tenderness to palpation over the bilateral greater trochanters and bilateral SI joint    Sensory:  Intact and symmetrical to light touch in C4-T1 dermatomes bilaterally. Intact and symmetrical to light touch in L1-S1 dermatomes bilaterally.    Motor:    Right Left   C4 Shoulder Abduction  5  5   C5 Elbow Flexion    5  5   C6 Wrist Extension  5  5   C7 Elbow Extension   5  5   C8/T1 Hand Intrinsics   5  5        Right Left   L2/3 Iliacus Hip flexion  5  5   L3/4 Qudratus Femoris Knee Extension  5  5   L4/5 Tib Anterior Ankle Dorsiflexion   5  5   L5/S1 Extensor Hallicus Longus Great toe extension  5  5   S1/S2 Gastroc/Soleus Plantar Flexion  5 "  5      Right Left   Triceps DTR 2+ 2+   Biceps DTR 2+ 2+   Brachioradialis DTR 2+ 2+   Patellar DTR 2+ 2+   Achilles DTR 2+ 2+   Ness Absent  Absent   Clonus Absent Absent   Babinski Absent Absent       Imaging:    MRI cervical spine 3/13/25:  FINDINGS:  The bone marrow signal intensity is heterogeneous with Modic endplate changes present about the C4, C5, and C6 vertebral bodies.  Cervical spinal alignment is satisfactory.  There is loss of cervical disc height with cervical disc desiccation at all levels throughout the cervical spine.     The craniocervical junction is unremarkable.     At the C2-3 disc level, the posterior disc contour is smooth.  The central spinal canal and neural foramina patent.  At the C3-4 disc level, there is a broad-based cervical disc bulge which contacts the ventral aspect of the thecal sac.  No central spinal canal or foraminal stenosis.  At the C4-5 disc level, there is a broad-based cervical disc bulge with a superimposed midline 3 mm cervical disc protrusion, image 22 of series 19.  This contacts the ventral aspect of the thecal sac.  The central spinal canal and neural foramina are patent.  At the C5-6 disc level, there is an asymmetric broad-based cervical disc bulge with mild bilateral facet arthrosis present.  No central spinal canal or foraminal stenosis.  At the C6-7 disc level, there is uncovertebral joint hypertrophy and facet arthrosis, right greater than left.  The posterior disc contour is smooth.  No central spinal canal or foraminal stenosis.     Impression:  1. Abnormal exam demonstrating 3 mm midline cervical disc protrusion at C4-5, without associated central spinal canal or foraminal stenosis.  2. Broad-based cervical disc bulge at C3-4 and C5-6, without central spinal canal or foraminal stenosis.    MRI lumbar spine 3/13/25:  FINDINGS:  Bone marrow signal intensity is heterogeneous with Modic endplate changes noted throughout the lumbar spine.  In addition,  there are linear abnormal increased signal foci along the inferior endplate of the L3 vertebral body and superior endplate of the L4 vertebral body, best identified on image 6 of series 19,.  We have no history of trauma in the absence of acute trauma, endplate osteoporotic microfracture would be in the differential.     The conus terminates at the L1 vertebral body level.  There is loss of lumbar disc height at all levels throughout the lumbar spine with lumbar disc desiccation present.  The paraspinous soft tissues demonstrate nothing unusual.     At the L1-2 disc level, the posterior disc contour is smooth.  No central spinal canal or foraminal stenosis.  At the L2-3 disc level, there is an asymmetric broad-based lumbar disc bulge which flattens the ventral aspect of the thecal sac.  There is mild bilateral facet arthrosis present.  No central spinal canal or foraminal stenosis.  At the L3-4 disc level, there is a broad-based lumbar disc bulge with a far right lateral canal component present.  There is ligamentum flavum hypertrophy and facet arthrosis present.  No central spinal canal or foraminal stenosis.  At the L4-5 disc level, there is a broad-based lumbar disc bulge with a far right lateral canal lumbar disc protrusion, image 8 of series 17, measuring 9 mm in in AP dimension, and contacting and displacing the exiting right L4 nerve root, image 30 of series 20.  The disc bulge contacts the ventral aspect of the thecal sac.  There is right L4-5 foraminal stenosis without central spinal canal stenosis.  At the L5-S1 disc level, there is an asymmetric broad-based lumbar disc bulge with a far right lateral canal component noted.  This displaces the exiting right L5 nerve root laterally, image 34 of series 20 as demarcated by the arrow.  There is mild right L5-S1 foraminal stenosis with ligamentum flavum hypertrophy and facet arthrosis present.  Note is made of a Tarlov cyst at the level of the sacral spinal  canal dorsal to the S1 nerve root sheath, image 38 of series 20.     Impression:  1. Acute bone marrow edema along the endplates of the L3 and L4 vertebral body adjacent to an abnormal desiccated L3-4 lumbar disc.  Osteoporotic microfracture would be in the differential.  Degenerative disc disease would also be in the differential.  2. Far right lateral canal lumbar disc protrusion at L4-5 with right L4-5 foraminal stenosis as discussed above.  3. Asymmetric broad-based lumbar disc bulge at L5-S1 with displacement of the right L5 nerve root laterally.    Assessment:   She presents for neck pain and for lumbar radiculoapthy. PT without major improvement in level of pain experienced.      She has neck pain without any current radicuar symptoms.  MRI of the neck shows some normal age appropriate degenerative chagne without foraminal narrowing.  Most likely myofascial neck pain.    Lower back pain with lumbar radiculoapthy due to degenerative changes at Lt L4/5, L5/S1 the greatest with some foraminal narrowing.  No neurological deficits.  .    Plan:  - will continue with tylenol for pain.  She is taking plavix and cannot tolerate nsaids at this time.  - continue with PT if exercise and/ or massage is helping; may also consider massage therapist outside of PT.   - if no improvement with PT, we discussed  L5/S1 IL FRANCISCO for lower back and leg pain.  With relatively normal MRI of the cervical spine, there are no interventional procedures or surgical interventions recommended at this time.    - she understands FRANCISCO is an option, but does not want to pursue right now.  She will call back and forllow up with me in the [facility].      Problem List Items Addressed This Visit    None  Visit Diagnoses         Lumbar radiculopathy    -  Primary      Cervicalgia          Lumbar spondylosis                    Follow Up: RTC as needed    : Reviewed and consistent with medication use as  prescribed.          Candelaria Appiah PA-C  [facility]

## 2025-05-06 ENCOUNTER — OFFICE VISIT (OUTPATIENT)
Dept: NEUROLOGY | Facility: CLINIC | Age: 65
End: 2025-05-06
Payer: COMMERCIAL

## 2025-05-06 VITALS
DIASTOLIC BLOOD PRESSURE: 60 MMHG | SYSTOLIC BLOOD PRESSURE: 89 MMHG | BODY MASS INDEX: 27.78 KG/M2 | WEIGHT: 147.06 LBS | HEART RATE: 77 BPM

## 2025-05-06 DIAGNOSIS — R42 DIZZINESS: ICD-10-CM

## 2025-05-06 PROCEDURE — 3008F BODY MASS INDEX DOCD: CPT | Mod: CPTII,S$GLB,, | Performed by: NURSE PRACTITIONER

## 2025-05-06 PROCEDURE — 1160F RVW MEDS BY RX/DR IN RCRD: CPT | Mod: CPTII,S$GLB,, | Performed by: NURSE PRACTITIONER

## 2025-05-06 PROCEDURE — 4010F ACE/ARB THERAPY RXD/TAKEN: CPT | Mod: CPTII,S$GLB,, | Performed by: NURSE PRACTITIONER

## 2025-05-06 PROCEDURE — 99999 PR PBB SHADOW E&M-EST. PATIENT-LVL V: CPT | Mod: PBBFAC,,, | Performed by: NURSE PRACTITIONER

## 2025-05-06 PROCEDURE — 99203 OFFICE O/P NEW LOW 30 MIN: CPT | Mod: S$GLB,,, | Performed by: NURSE PRACTITIONER

## 2025-05-06 PROCEDURE — 3078F DIAST BP <80 MM HG: CPT | Mod: CPTII,S$GLB,, | Performed by: NURSE PRACTITIONER

## 2025-05-06 PROCEDURE — 3044F HG A1C LEVEL LT 7.0%: CPT | Mod: CPTII,S$GLB,, | Performed by: NURSE PRACTITIONER

## 2025-05-06 PROCEDURE — 1159F MED LIST DOCD IN RCRD: CPT | Mod: CPTII,S$GLB,, | Performed by: NURSE PRACTITIONER

## 2025-05-06 PROCEDURE — 3074F SYST BP LT 130 MM HG: CPT | Mod: CPTII,S$GLB,, | Performed by: NURSE PRACTITIONER

## 2025-05-06 RX ORDER — ACETAMINOPHEN 500 MG
500 TABLET ORAL EVERY 4 HOURS PRN
COMMUNITY

## 2025-05-06 RX ORDER — LANSOPRAZOLE 30 MG/1
30 CAPSULE, DELAYED RELEASE ORAL DAILY
COMMUNITY

## 2025-05-06 NOTE — PROGRESS NOTES
NEUROLOGY  Outpatient Consultation Visit     [facility]  [address] [facility] [address], [place] [address]  [phone] (office) / [phone] (fax)    Patient Name:  Chloe Obrien  :  1960  MR #:  [id number]  Acct #:  [id number]    Date of Neurology Consult: 2025  Name of Provider: CHANDRA Akbar    Other Physicians:  Jasmyn Cox MD (Primary Care Physician); Anahi Polanco NP (Referring)      Chief Complaint: Dizziness      History of Present Illness (HPI):  Chloe Obrien is a right handed  64 y.o. female with a PMHX of ocular migraines, MVP, HLD, thyroid diesae, GERD, diverticulosis, insomnia, depression, anxiety, CLEMENT, former smoker, HTN, CABG, IBS, chronic neck pain      Patient presents today for dizziness. She describes it as generalized weakness all over with lightheadedness. Onset ~ 4 mos ago.     The other day she reports squatting down but when she stood up she felt lightheaded and nearly blacked out. She had to hold onto something. After 1-2 minutes symptoms resolved. No LOC. She did have associated nausea. No spinning.     In the last several months her PCP and cardiologist began decreasing her BP medications in fear that her dizziness stemmed from BP fluctuations. This did offer aid initially but events continued.   She refers to dizziness as lightheadedness and it occurs more so when changing positions but can also occur when tilting her head back.   She has never lost consciousness.         Past Medical, Surgical, Family & Social History:   Past Medical History:   Diagnosis Date    1 PPD X 20+ YRs Tobacco Use Disorder     18 I Advocated Smoking Cessation And RXd     Anticoagulant long-term use     Aortic valve stenosis     Borderline Vitamin D Deficiency     10/21/18 And 16 RXd OTC D3 2K Daily    Cervical Spinal Degenerative Disc Disease     3/13/25 C-Spine MRI W/O Contrast = (See Report)    Chronic Insomnia `    On Restoril 30 Mg qHS PRN, Trazodone  "100 Mg qHS Caused Dizziness And Headaches    Chronic Neck Pain     3/13/25 C-Spine MRI W/O Contrast = (See Report)    Chronic Recurrent Left Foreleg Pain 03/20/2025 1/17/25 Dr. Jean Mascorro Referred To Dr. Surya Enriquez; 3/13/25 L-Spine MRI W/O Contrast = (See Report)    Depression     12/21/16 Decreased Wellbutrin Further And Increased Zoloft To 100 Mg Daily; 11/8/16 Decreased Wellbutrin-XL To 300 Mg qAM And RXd Zoloft 50 Mg qAM; She Sees A Therapist For This; Lexapro Was Innefective    Diverticulosis Of Sigmoid, Decending, Transverse, and Ascending Colon; W/O Diverticulitis On 9/24/18 EGD     Dr. KIRAN Heck    Fructose malabsorption     Gastroparesis     Dr. KIRAN Heck On 5/2/17 Continued Her Post Gastric Sleeve Diet    GERD With H/O Stricture Dilation     Dr. KIRAN Heck    H/O Abnormal Mammogram     F/U Mammograms And U/S Have Been Stable    H/O Abnormal Mammogram     F/U Mammograms And U/S Have Been Stable     H/O Adenomatous Colon Polyps     Dr. KIRAN Heck TC On 9/24/18 Rectal Polyps Only; "Repeat TC In 5 YRs"    H/O H. Pylori Gastritis 2009     Dr. KIRAN Heck; Received Triple Tx Then    H/O Nephrolithiasis     6/3/16 ABD/Pelvic CT W/W/O = 1 CM Right Renal Stone Only; 5/26/15 ABD/Pelvic CT With IV Contrast Only = Positive For A Nonobstructing Right Renal Stone    H/O of CABG 11/15/21     CABG X3 With [place]aho-Vein-Endovascular (Left)     H/O Recurrent Cyclical Nausea     Dr. KIRAN Heck    H/O Small Rectal Polyps On 9/24/18 TC; Nonbleeding, Biopsies Negative For Malignancy     Dr. Jonh Heck; "Repeat TC IN 5 YRs"    Hypercholesterolemia     On Livalo 4 Mg qHS    Hypertension     no longer taking medication since weight loss    Hypothyroidism     On [place] Thyroid    IBS (irritable bowel syndrome)     Left Hip Subcutaneous Nodule     3/5/25 This Resolved On Its Own; 1/16/25 Referred To Dr. Clarence Negron    Lumbar Spinal Degenerative Disc Disease 03/20/2025 "    1/17/25 Dr. Jean Mascorro Referred To Dr. Surya Enriquez; 3/13/25 L-Spine MRI W/O Contrast = (See Report)    Medium Internal Hemorrhoids With Rectal Bleeding On 9/24/18 EGD     Dr. KIRAN Heck; Will Schedule Clinic Visit For Hemorrhoid Banding    Mitral valve prolapse     Nonalcoholic Steatohepatitis (N.A.S.H.)     Dr. KIRAN Heck On 8/18/16 Ordered A Liver U/S And LFTs    Nonobstructive atherosclerosis of coronary artery     Obesity S/P Sleeve Gastrectomy With Hiatal hernia Repair 10/2015     Dr. Scot Werner    Ocular Migraines     Osteoporosis     10/21/18 RXd Fosamax 70 Mg Weekly, And OTC D3 2K IU Daily, And OTC CaCO3 600 Mg Daily    Partial tear of subscapularis tendon, left, subsequent encounter     08/2020    Pulmonary Nodules     Dr. Luis F Evans Will Repeat This In 1 Year; 8/7/18 Low Dose Chest CT = 1 Small Nodule In Each Lung    Rectal polyp     Renal disorder     nephrolithiasis    RLQ Pain Due To Epiploic Appendagitis 08/2016     Dr. KIRAN Heck TXd This With Augmentin And Naproxen After Cipro And Flagyl Failed    Rotator cuff syndrome of left shoulder 08/2020    Rotator cuff syndrome, left 08/10/2020    Rotator cuff syndrome, right 05/10/2021    RUQ Abdominal Pain 5/31/16     6/3/16 ABD/Pelvic CT W/W/O = 1 CM Right Renal Stone Only; 5/31/16 ABD XRays = Unremarkable; 5/31/16 Increased Her PPI To Bid X 1 Week     Past Surgical History:   Procedure Laterality Date    ANGIOGRAM, CORONARY, WITH LEFT HEART CATHETERIZATION N/A 11/22/2022    Procedure: ANGIOGRAM,CORONARY,WITH LEFT HEART CATHETERIZATION;  Surgeon: Zack Romero MD;  Location: [facility] CATH;  Service: Cardiology;  Laterality: N/A;    ANGIOGRAPHY OF LOWER EXTREMITY N/A 11/22/2022    Procedure: ANGIOGRAM, LOWER EXTREMITY;  Surgeon: Zack Romero MD;  Location: [facility] CATH;  Service: Cardiology;  Laterality: N/A;    ARTHROSCOPIC REPAIR OF ROTATOR CUFF OF SHOULDER Left 09/02/2020    Procedure: REPAIR, ROTATOR CUFF,  ARTHROSCOPIC;  Surgeon: Clifford Kendrick II, MD;  Location: [facility];  Service: Orthopedics;  Laterality: Left;    ARTHROSCOPY OF SHOULDER WITH DECOMPRESSION OF SUBACROMIAL SPACE Left 09/02/2020    Procedure: ARTHROSCOPY, SHOULDER, WITH SUBACROMIAL SPACE DECOMPRESSION;  Surgeon: Clifford Kendrick II, MD;  Location: [facility] OR;  Service: Orthopedics;  Laterality: Left;    BUNIONECTOMY      CARPAL TUNNEL RELEASE      CHOLECYSTECTOMY      COLONOSCOPY      september 2015    COLONOSCOPY N/A 11/26/2024    Procedure: COLONOSCOPY;  Surgeon: Kevin Turpin MD;  Location: [facility];  Service: Gastroenterology;  Laterality: N/A;    CORONARY ARTERY BYPASS GRAFT (CABG) N/A 11/15/2021    Procedure: CORONARY ARTERY BYPASS GRAFT (CABG) x 3;  Surgeon: Fabian Montana MD;  Location: [facility] OR [address] [place] [address];  Service: Cardiothoracic;  Laterality: N/A;    ENDOSCOPIC HARVEST OF VEIN Left 11/15/2021    Procedure: HARVEST-VEIN-ENDOVASCULAR;  Surgeon: Fabian Montana MD;  Location: [facility] OR [address] [place] [address];  Service: Cardiothoracic;  Laterality: Left;  Vein harvest start: 1430  Vein harvest stop: 1504  Vein prep start: 1504  Vein prep stop: 1527    ESOPHAGOGASTRODUODENOSCOPY  09/15/2023    ESOPHAGOGASTRODUODENOSCOPY N/A 11/26/2024    Procedure: EGD (ESOPHAGOGASTRODUODENOSCOPY);  Surgeon: Kvein Turpin MD;  Location: [facility];  Service: Gastroenterology;  Laterality: N/A;    EXCISIONAL HEMORRHOIDECTOMY N/A 05/08/2019    Procedure: HEMORRHOIDECTOMY;  Surgeon: Jelani Leal MD;  Location: [facility];  Service: General;  Laterality: N/A;    FIXATION OF TENDON Left 09/02/2020    Procedure: FIXATION, TENDON - open biceps tenodesis;  Surgeon: Clifford Kendrick II, MD;  Location: [facility];  Service: Orthopedics;  Laterality: Left;    FLEXIBLE SIGMOIDOSCOPY Left 11/26/2018    Procedure: SIGMOIDOSCOPY, FLEXIBLE;  Surgeon: Anatoliy Simms MD;  Location: [facility];  Service: Endoscopy;  Laterality: Left;    GASTRECTOMY      gastric sleeve     gastric sleeve  10/2015    HERNIA REPAIR      HYSTERECTOMY      total @ age 34    LEFT HEART CATHETERIZATION Left 11/09/2021    Procedure: Left heart cath;  Surgeon: Samantha Vergara MD;  Location: [facility] CATH;  Service: Cardiology;  Laterality: Left;    OOPHORECTOMY      w/Hysterectomy @ age 34    TUBAL LIGATION       Family History   Problem Relation Name Age of Onset    Stomach cancer Mother      Heart disease Father      Hypertension Father      Hypothyroidism Father      Valvular heart disease Father      Mitral valve prolapse Sister      Wakefield's esophagus Sister      Colon cancer Brother      Hypothyroidism Daughter      Hyperlipidemia Daughter      Hypothyroidism Daughter      Hyperlipidemia Daughter      Pancreatic cancer Maternal Uncle      Colon cancer Paternal Aunt      Breast cancer Paternal Aunt  55    Cancer Paternal Aunt      Diabetes Maternal Grandmother      Schizophrenia Maternal Grandfather      Heart disease Paternal Grandfather      Crohn's disease Neg Hx      Ulcerative colitis Neg Hx       Alcohol use:  reports no history of alcohol use.   (Of note, 0.6 oz = 1 beer or 6 oz = 10 beers).  Tobacco use:  reports that she quit smoking about 4 years ago. Her smoking use included cigarettes. She started smoking about 47 years ago. She has a 34.4 pack-year smoking history. She has never used smokeless tobacco.  Street drug use:  reports no history of drug use.  Allergies: Keflex [cephalexin] and Levofloxacin.    Home Medications:   Current Medications[1]    Physical Examination:  BP (!) 89/60 (BP Location: Left arm, Patient Position: Standing)   Pulse 77   Wt 66.7 kg (147 lb 0.8 oz)   BMI 27.78 kg/m²     GENERAL:  General appearance: Well, non-toxic appearing.  No apparent distress.  Neck: supple.    MENTAL STATUS:  Alertness, attention span & concentration: normal.  Language: normal.  Orientation to self, place & time:  normal.  Memory, recent & remote: normal.  Fund of knowledge:  normal.    SPEECH:  Clear and fluent.  Follows complex commands.      CRANIAL NERVES:  Cranial Nerves II-XII were examined.  II - Visual fields: normal.  III, IV, VI: PERRL, EOMI, No ptosis, No nystagmus.  V - Facial sensation: normal.  VII - Face symmetry & mobility: normal.  VIII - Hearing: normal  IX, X - Palate: mobile & midline.  XI - Shoulder shrug: normal.  XII - Tongue protrusion: normal.        GROSS MOTOR:  Gait & station: able to rise from chair with arms crossed over chest; non focal (felt somewhat lightheaded when she stood)  Tone: normal.  Abnormal movements: none.  Finger-nose: normal.  Rapid alternating movements: normal.  Pronator drift: normal          MUSCLE STRENGTH:     RIGHT    LEFT   5 Neck Ext. 5   5 Neck Flex 5   5 Deltoids 5   5 Biceps 5   5 Triceps 5   5 Forearm.Pr. 5        5 Iliopsoas flex    5   5 Hip Abduct 5   5 Hip Adduct 5   5 Quads 5   5 Hams 5   5 Dorsiflex 5   5 Plantar Flex 5   5 Ankle French 5   5 Ankle Invert 5     REFLEXES:    RIGHT Reflex   LEFT   2+ Biceps 2+   2+ Brachiorad. 2+        2+ Patellar 2+       Diagnostic Data Reviewed:   I have personally reviewed provider notes, labs and imaging made available to me today.     Imaging:    CT Head Without Contrast 2/2025    Narrative  EXAMINATION:  CT HEAD WITHOUT CONTRAST    CLINICAL HISTORY:  Dizziness, non-specific; Dizziness and giddiness    TECHNIQUE:  Routine unenhanced axial images were obtained through the head.  Sagittal and coronal reformatted images were created.  The study is reviewed in bone and soft tissue windows.    COMPARISON:  None    FINDINGS:  Intracranial contents: There is no acute intracranial abnormality.  Brain volume, ventricular size and position are normal.  There is no hemorrhage or mass/mass effect.  There is no acute edema or ischemia.  The gray-white interface is preserved without obvious acute infarction.  There are no definite regions of abnormal attenuation in the brain.  There is no dense  vessel.  There is no abnormal extra-axial fluid collection.  The basilar cisterns are open.  The cerebellar tonsils are in normal position at the level of the foramen magnum.  There is a mildly expanded empty sella.  The orbits are grossly normal.    Extracranial contents, calvarium, soft tissues: The calvarium is normal.  The included paranasal sinuses and mastoid air cells are clear.    Impression  1.  There is no acute intracranial abnormality.  There is no hemorrhage, mass/mass effect, acute edema or ischemia.    2.  There is a mildly expanded empty sella.        Cardiac:  CUS 2/2025:       Mild heterogeneous calcified plaque in the right carotid bulb and proximal right internal carotid artery with no stenosis in either carotid artery system.    Normal antegrade vertebral flow bilaterally.     Labs:  Lab Results   Component Value Date    WBC 10.05 02/01/2025    HGB 12.6 02/01/2025    HCT 37.9 02/01/2025     02/01/2025    MCV 89 02/01/2025    RDW 12.7 02/01/2025     Lab Results   Component Value Date     02/01/2025    K 3.7 02/01/2025     (H) 02/01/2025    CO2 26 02/01/2025    BUN 16 02/01/2025    CREATININE 0.90 02/01/2025    GLU 90 02/01/2025    CALCIUM 9.0 02/01/2025    MG 2.1 08/19/2023    PHOS 3.8 04/12/2024     Lab Results   Component Value Date    PROT 5.8 (L) 02/01/2025    ALBUMIN 3.8 02/01/2025    BILITOT 1.2 (H) 02/01/2025    AST 26 02/01/2025    ALKPHOS 86 02/01/2025    ALT 57 (H) 02/01/2025     Lab Results   Component Value Date    INR 1.0 11/17/2021     Lab Results   Component Value Date    CHOL 132 02/01/2025    HDL 61 02/01/2025    LDLCALC 60.6 (L) 02/01/2025    TRIG 52 02/01/2025    CHOLHDL 46.2 02/01/2025     Lab Results   Component Value Date    LABA1C 5.3 03/29/2016    HGBA1C 4.9 02/01/2025      Lab Results   Component Value Date    LLYEXOHC84 923 10/29/2022     Lab Results   Component Value Date    FOLATE >20.0 10/29/2022     Lab Results   Component Value Date    TSH 0.648  "02/01/2025     No results found for: "VITAMINB1"  No results found for: "RPR"  No results found for: "LALO"  No components found for: "HEPATITISCANTIBODY"  No components found for: "HIV 1/2 AG/AB"  Lab Results   Component Value Date    CRP <0.50 02/07/2023     No components found for: "SEDIMENTATIONRATE"      Assessment and Plan:  Chloe Obrien is a 64 y.o. female.    Problem List Items Addressed This Visit          Other    Dizziness    Current Assessment & Plan   Reports of dizziness that began ~ 4 mos ago    - reported as lightheadedness with associated generalized weakness. Recent episode of near syncope with associated tunnel vision.   Occurs when changing positions.   No spinning but does also endorse occasional nausea  Orthostatic Bps noted with 11 pt drop in SBP today. Pt did endorse feeling lightheaded today when changing positions.   Neuro exam non focal  Recent CUS unrevealing   Offered further neuro testing (CTA or MRI) to rule out neuro etiologies but pt opted to hold on this for now  She has been working with his Mds on BP med changes and would like to continue tracking this. Should she change her mind on neuro testing she will inform me.                             Important to note, also  has a past medical history of 1 PPD X 20+ YRs Tobacco Use Disorder, Anticoagulant long-term use, Aortic valve stenosis, Borderline Vitamin D Deficiency, Cervical Spinal Degenerative Disc Disease, Chronic Insomnia (`), Chronic Neck Pain, Chronic Recurrent Left Foreleg Pain (03/20/2025), Depression, Diverticulosis Of Sigmoid, Decending, Transverse, and Ascending Colon; W/O Diverticulitis On 9/24/18 EGD, Fructose malabsorption, Gastroparesis, GERD With H/O Stricture Dilation, H/O Abnormal Mammogram, H/O Abnormal Mammogram, H/O Adenomatous Colon Polyps, H/O H. Pylori Gastritis 2009, H/O Nephrolithiasis, H/O of CABG 11/15/21, H/O Recurrent Cyclical Nausea, H/O Small Rectal Polyps On 9/24/18 TC; Nonbleeding, Biopsies " Negative For Malignancy, Hypercholesterolemia, Hypertension, Hypothyroidism, IBS (irritable bowel syndrome), Left Hip Subcutaneous Nodule, Lumbar Spinal Degenerative Disc Disease (03/20/2025), Medium Internal Hemorrhoids With Rectal Bleeding On 9/24/18 EGD, Mitral valve prolapse, Nonalcoholic Steatohepatitis (N.A.S.H.), Nonobstructive atherosclerosis of coronary artery, Obesity S/P Sleeve Gastrectomy With Hiatal hernia Repair 10/2015, Ocular Migraines, Osteoporosis, Partial tear of subscapularis tendon, left, subsequent encounter, Pulmonary Nodules, Rectal polyp, Renal disorder, RLQ Pain Due To Epiploic Appendagitis 08/2016, Rotator cuff syndrome of left shoulder (08/2020), Rotator cuff syndrome, left (08/10/2020), Rotator cuff syndrome, right (05/10/2021), and RUQ Abdominal Pain 5/31/16.            The patient will return to clinic as needed        All questions were answered and patient is comfortable with the plan.       Thank you very much for the opportunity to assist in this patient's care.    If you have any questions or concerns, please do not hesitate to contact me at any time.    Sincerely,     CHANDRA Akbar  [facility] [place]         I spent a total of 30 minutes on the day of the visit.This includes face to face time and non-face to face time preparing to see the patient (eg, review of tests), Obtaining and/or reviewing separately obtained history, Documenting clinical information in the electronic or other health record, Independently interpreting resultsand communicating results to the patient/family/caregiver, or Care coordination.         [1]   Current Outpatient Medications:     propranoloL (INDERAL) 20 MG tablet, TAKE 1 TABLET BY MOUTH TWICE A DAY (Patient taking differently: Take 20 mg by mouth Daily.), Disp: 180 tablet, Rfl: 1    acetaminophen (TYLENOL) 500 MG tablet, Take 500 mg by mouth every 4 (four) hours as needed., Disp: , Rfl:     albuterol (PROAIR HFA) 90  mcg/actuation inhaler, Inhale 2 puffs into the lungs every 6 (six) hours as needed for Wheezing or Shortness of Breath. Rescue, Disp: 18 g, Rfl: 2    aspirin (ECOTRIN) 81 MG EC tablet, Take 81 mg by mouth once daily., Disp: , Rfl:     azelastine (ASTELIN) 137 mcg (0.1 %) nasal spray, 2 sprays (274 mcg total) by Nasal route 2 (two) times daily., Disp: 90 mL, Rfl: 3    buPROPion (WELLBUTRIN) 75 MG tablet, TAKE 1 TABLET BY MOUTH EVERY MORNING., Disp: 90 tablet, Rfl: 1    busPIRone (BUSPAR) 15 MG tablet, Take 1 tablet (15 mg total) by mouth 2 (two) times daily., Disp: 180 tablet, Rfl: 0    cetirizine (ZYRTEC) 10 MG tablet, Take 10 mg by mouth once daily., Disp: , Rfl:     cyanocobalamin 1,000 mcg/mL injection, Inject 1,000 mcg into the muscle every 30 days., Disp: , Rfl:     diclofenac sodium (VOLTAREN ARTHRITIS PAIN) 1 % Gel, Apply 2 g topically 4 (four) times daily., Disp: 100 g, Rfl: 3    ergocalciferol (ERGOCALCIFEROL) 50,000 unit Cap, TAKE 1 CAPSULE BY MOUTH ONE TIME PER WEEK, Disp: 12 capsule, Rfl: 2    ezetimibe (ZETIA) 10 mg tablet, Take 10 mg by mouth., Disp: , Rfl:     famotidine (PEPCID) 40 MG tablet, Take 40 mg by mouth nightly as needed for Heartburn., Disp: , Rfl:     fluticasone propionate (FLONASE) 50 mcg/actuation nasal spray, 2 sprays (100 mcg total) by Each Nostril route once daily., Disp: 16 g, Rfl: 11    fluticasone-umeclidin-vilanter (TRELEGY ELLIPTA) 100-62.5-25 mcg DsDv, Inhale 1 puff into the lungs once daily., Disp: 1 each, Rfl: 5    hydrOXYzine pamoate (VISTARIL) 25 MG Cap, Take 1 capsule (25 mg total) by mouth 3 (three) times daily., Disp: 270 capsule, Rfl: 0    Lactobacillus rhamnosus GG (CULTURELLE) 10 billion cell capsule, Take 1 capsule by mouth 2 (two) times a day., Disp: , Rfl:     lansoprazole (PREVACID) 30 MG capsule, Take 30 mg by mouth once daily., Disp: , Rfl:     levothyroxine (SYNTHROID) 112 MCG tablet, Take 112 mcg by mouth. Once weekly, Disp: , Rfl:     levothyroxine  (SYNTHROID) 125 MCG tablet, Take 125 mcg by mouth before breakfast. 6 days weekly, Disp: , Rfl:     montelukast (SINGULAIR) 10 mg tablet, Take 1 tablet (10 mg total) by mouth every evening., Disp: 90 tablet, Rfl: 3    MOUNJARO 10 mg/0.5 mL PnIj, Inject into the skin., Disp: , Rfl:     multivit-min/ferrous fumarate (MULTI VITAMIN ORAL), Take by mouth., Disp: , Rfl:     nitroGLYCERIN (NITROSTAT) 0.4 MG SL tablet, , Disp: , Rfl:     olmesartan (BENICAR) 20 MG tablet, Take by mouth., Disp: , Rfl:     omeprazole (PRILOSEC) 40 MG capsule, Take 1 capsule (40 mg total) by mouth 2 (two) times daily before meals., Disp: 60 capsule, Rfl: 1    ondansetron (ZOFRAN-ODT) 4 MG TbDL, DISSOLVE 1 TABLET BY MOUTH EVERY 6 HOURS AS NEEDED FOR NAUSEA AND VOMITING, Disp: , Rfl:     rosuvastatin (CRESTOR) 20 MG tablet, Take 20 mg by mouth every evening., Disp: , Rfl:     traZODone (DESYREL) 150 MG tablet, Take 1 tablet (150 mg total) by mouth every evening., Disp: 90 tablet, Rfl: 0    ubidecarenone (CO Q-10 ORAL), Take by mouth., Disp: , Rfl:     UNABLE TO FIND, UNITHYROID 125 mcg-pt takes 6 times weekly, Disp: , Rfl:     vilazodone (VIIBRYD) 40 mg Tab tablet, Take 1 tablet (40 mg total) by mouth once daily., Disp: 90 tablet, Rfl: 1    vitamin D (VITAMIN D3) 1000 units Tab, Take 1,000 Units by mouth once daily., Disp: , Rfl:

## 2025-05-06 NOTE — ASSESSMENT & PLAN NOTE
Reports of dizziness that began ~ 4 mos ago    - reported as lightheadedness with associated generalized weakness. Recent episode of near syncope with associated tunnel vision.   Occurs when changing positions.   No spinning but does also endorse occasional nausea  Orthostatic Bps noted with 11 pt drop in SBP today. Pt did endorse feeling lightheaded today when changing positions.   Neuro exam non focal  Recent CUS unrevealing   Offered further neuro testing (CTA or MRI) to rule out neuro etiologies but pt opted to hold on this for now  She has been working with his Mds on BP med changes and would like to continue tracking this. Should she change her mind on neuro testing she will inform me.

## 2025-05-23 ENCOUNTER — PATIENT MESSAGE (OUTPATIENT)
Dept: PSYCHIATRY | Facility: CLINIC | Age: 65
End: 2025-05-23
Payer: COMMERCIAL

## 2025-05-26 ENCOUNTER — OFFICE VISIT (OUTPATIENT)
Dept: PSYCHIATRY | Facility: CLINIC | Age: 65
End: 2025-05-26
Payer: COMMERCIAL

## 2025-05-26 DIAGNOSIS — F41.1 GENERALIZED ANXIETY DISORDER: ICD-10-CM

## 2025-05-26 DIAGNOSIS — F33.40 RECURRENT MAJOR DEPRESSIVE DISORDER, IN REMISSION: Primary | ICD-10-CM

## 2025-05-26 DIAGNOSIS — G47.00 INSOMNIA, UNSPECIFIED TYPE: ICD-10-CM

## 2025-05-26 DIAGNOSIS — F50.814 BINGE EATING DISORDER IN REMISSION: ICD-10-CM

## 2025-05-26 PROCEDURE — 1159F MED LIST DOCD IN RCRD: CPT | Mod: CPTII,95,,

## 2025-05-26 PROCEDURE — 3044F HG A1C LEVEL LT 7.0%: CPT | Mod: CPTII,95,,

## 2025-05-26 PROCEDURE — 98007 SYNCH AUDIO-VIDEO EST HI 40: CPT | Mod: 95,,,

## 2025-05-26 PROCEDURE — G2211 COMPLEX E/M VISIT ADD ON: HCPCS | Mod: 95,,,

## 2025-05-26 PROCEDURE — 1160F RVW MEDS BY RX/DR IN RCRD: CPT | Mod: CPTII,95,,

## 2025-05-26 PROCEDURE — 4010F ACE/ARB THERAPY RXD/TAKEN: CPT | Mod: CPTII,95,,

## 2025-05-26 RX ORDER — BUSPIRONE HYDROCHLORIDE 15 MG/1
15 TABLET ORAL 2 TIMES DAILY
Qty: 180 TABLET | Refills: 0 | Status: SHIPPED | OUTPATIENT
Start: 2025-05-26

## 2025-05-26 RX ORDER — HYDROXYZINE PAMOATE 25 MG/1
25 CAPSULE ORAL 3 TIMES DAILY
Qty: 270 CAPSULE | Refills: 0 | Status: SHIPPED | OUTPATIENT
Start: 2025-05-26

## 2025-05-26 RX ORDER — TRAZODONE HYDROCHLORIDE 150 MG/1
150 TABLET ORAL NIGHTLY
Qty: 90 TABLET | Refills: 0 | Status: SHIPPED | OUTPATIENT
Start: 2025-05-26

## 2025-05-26 RX ORDER — VILAZODONE HYDROCHLORIDE 40 MG/1
40 TABLET ORAL DAILY
Qty: 90 TABLET | Refills: 1 | Status: SHIPPED | OUTPATIENT
Start: 2025-05-26

## 2025-05-26 RX ORDER — BUPROPION HYDROCHLORIDE 75 MG/1
75 TABLET ORAL EVERY MORNING
Qty: 90 TABLET | Refills: 1 | Status: SHIPPED | OUTPATIENT
Start: 2025-05-26

## 2025-05-26 NOTE — PROGRESS NOTES
OUTPATIENT PSYCHIATRY FOLLOW UP VISIT    Encounter Date: 5/26/2025    Clinical Status of Patient:  Outpatient (Virtual)  The patient location is: [facility] [place] [address]  The patient phone number is: [phone]   Visit type: Virtual visit with synchronous audio and video  Each patient to whom he or she provides medical services by telemedicine is:  (1) informed of the relationship between the practitioner and patient and the respective role of any other health care provider with respect to management of the patient; and (2) notified that he or she may decline to receive medical services by telemedicine and may withdraw from such care at any time.    Chief Complaint:  Chloe Obrien is a 64 y.o. female who presents today for follow-up.  Met with patient.      HISTORY OF PRESENTING ILLNESS:  Chloe Obrien is a 64 y.o. female with history of MDD, KIANA, BED, insomnia, s/p sleeve gastrectomy, CABGx3 who presents for follow up appointment.      6/13/2023: Pharmacogenomic test results reviewed with Pt. Pt has NO major gene-drug interactions found and only 4 medications listed under moderate gene-drug interactions (clozapine, olanzapine, asenapine, and nicotine). Decreased MTHFR activity.     Plan at last appointment:   Continue vilazodone 40 mg daily for mood, anxiety, and irritability  Continue bupropion 75 mg q.a.m. for mood  Continue buspirone from 15 mg t.i.d. for generalized anxiety and irritability  Continue hydroxyzine 50 mg q.h.s. p.r.n. anxiety/insomnia (Pt agrees to stop taking b.i.d. and to only take p.r.n. for severe anxiety / panic)  Continue trazodone 150 mg q.h.s. p.r.n. insomnia (residual insomnia at 100 mg dose)  Continue L-methylfolate 15 milligrams daily due to decreased MTHFR activity  Pt has discontinued regular individual psychotherapy with ERVIN Price LCSW, but will schedule p.r.n. if needed    Psychotropic medication history:   Ramelteon 8 mg q.h.s., xanax 0.5 mg TID, Adderall IR 15 mg  "BID,   Topamax ("tingling, wicked medicine")  Zoloft, celexa, cymbalta, pristiq (before heart surgery, took off of everything), Lexapro (ineffective), Effexor (ineffective),    Wellbutrin   Unsure of previous trial of rexulti  Denies previous trials of mirtazapipne, Abilify, vraylar      INTERVAL HISTORY:    Patient reports experiencing breakthrough episodes of sadness, occurring once or twice weekly and lasting about an hour. She has been able to manage these episodes by talking herself out of the negative feelings. Patient mentions having been irritable and easily frustrated before starting hydroxyzine.    Patient has been taking hydroxyzine 3 times daily for the past month, which she reports has helped improve her mood and reduce feelings of being "down." She has incorporated this medication into her existing regimen to better manage her emotional state.    Patient reports low energy levels but attributes this primarily to ongoing back pain issues rather than medication side effects. She mentions being semi-retired and not required to do much, which may be impacting her ability to gauge energy levels accurately.    Patient is experiencing significant back pain and is currently undergoing therapy for it. She is considering an epidural treatment, which she was initially hesitant about but is now leaning towards trying due to the severity of the pain.    Patient reports sleeping well with the help of trazodone. She mentions having frequent dreams, often related to past experiences or anxiety-inducing scenarios like "the world coming to an end." She denies nightmares, however.    Patient is currently taking Mounjaro and reports maintaining a stable weight of 139 lbs for the past 4-5 months, following a significant weight loss of approximately 45 lbs.    Patient believes her ability to concentrate and complete tasks has been adequate. She has found that listening to podcasts helps her stay focused by blocking " out distractions.    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    MEDICATIONS:  Patient is on Hydroxyzine, taken orally 3 times daily for the past month to manage mood and anxiety, which she finds helpful. She takes Trazodone at night for sleep, which is effective. She is also on Mounjaro for weight loss and has maintained her weight at 139 lbs for 4-5 months.    LIFESTYLE:  Patient is semi-retired. 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 For hobbies and interests, she listens to podcasts to help stay on track and concentrate.    Is patient experiencing or having changes in:  Trouble with sleep: sleeping well with trazodone   Appetite changes: decreased with Mounjaro  Weight changes: has lost 45 lbs over the last year with Mounjaro  Lack of energy: energy has been low, but she relates this to back pain  Anhedonia:  no  Somatic symptoms:  no  Anxiety/panic: well controlled  Irritability: well controlled  Guilty/hopeless: no   Concentration: no  Racing thoughts: no  Impulsive behaviors: no  Paranoia/AVH: no  Self-injurious behavior/risky behavior:  no  Any drugs:  no  Alcohol:  no    No interval episodes with symptoms consistent with rosendo or hypomania.  Denied interval or current suicidal/homicidal thoughts, intent, or plan or NSSI.  Denied other questions and concerns.    Medication side effects: None  Medication adherence: yes    MEDICAL REVIEW OF SYSTEMS:   Pain:  +chronic pain  General: -fever, -chills, -fatigue, -weight gain, -weight loss, +decreased energy levels, +loss of energy  Eyes: -vision changes, -redness, -discharge  ENT: -ear pain, -nasal congestion, -sore throat  Cardiovascular: -chest pain, -palpitations, -lower  extremity edema  Respiratory: -cough, -shortness of breath  Gastrointestinal: -abdominal pain, -nausea, -vomiting, -diarrhea, -constipation, -blood in stool  Genitourinary: -dysuria, -hematuria, -frequency  Musculoskeletal: -joint pain, -muscle pain, +back pain  Skin: -rash, -lesion  Neurological: -headache, -dizziness, -numbness, -tingling  Psychiatric: +anxiety, -depression, -sleep difficulty, +new or worsening mood changes, +irritability    PAST PSYCHIATRIC, MEDICAL, AND SOCIAL HISTORY REVIEWED  The patient's past medical, family and social history have been reviewed and updated as appropriate within the electronic medical record - see encounter notes.    PAST MEDICAL HISTORY:   Past Medical History:   Diagnosis Date    1 PPD X 20+ YRs Tobacco Use Disorder     11/14/18 I Advocated Smoking Cessation And RXd     Anticoagulant long-term use     Aortic valve stenosis     Borderline Vitamin D Deficiency     10/21/18 And 4/4/16 RXd OTC D3 2K Daily    Cervical Spinal Degenerative Disc Disease     3/13/25 C-Spine MRI W/O Contrast = (See Report)    Chronic Insomnia `    On Restoril 30 Mg qHS PRN, Trazodone 100 Mg qHS Caused Dizziness And Headaches    Chronic Neck Pain     3/13/25 C-Spine MRI W/O Contrast = (See Report)    Chronic Recurrent Left Foreleg Pain 03/20/2025 1/17/25 Dr. Jean Mascorro Referred To Dr. Surya Enriquez; 3/13/25 L-Spine MRI W/O Contrast = (See Report)    Depression     12/21/16 Decreased Wellbutrin Further And Increased Zoloft To 100 Mg Daily; 11/8/16 Decreased Wellbutrin-XL To 300 Mg qAM And RXd Zoloft 50 Mg qAM; She Sees A Therapist For This; Lexapro Was Innefective    Diverticulosis Of Sigmoid, Decending, Transverse, and Ascending Colon; W/O Diverticulitis On 9/24/18 EGD     Dr. KIRAN Heck    Fructose malabsorption     Gastroparesis     Dr. KIRAN Heck On 5/2/17 Continued Her Post Gastric Sleeve Diet    GERD With H/O Stricture Dilation     Dr. KIRAN Heck    H/O Abnormal  "Mammogram     F/U Mammograms And U/S Have Been Stable    H/O Abnormal Mammogram     F/U Mammograms And U/S Have Been Stable     H/O Adenomatous Colon Polyps     Dr. KIRAN Heck TC On 9/24/18 Rectal Polyps Only; "Repeat TC In 5 YRs"    H/O H. Pylori Gastritis 2009     Dr. KIRAN Heck; Received Triple Tx Then    H/O Nephrolithiasis     6/3/16 ABD/Pelvic CT W/W/O = 1 CM Right Renal Stone Only; 5/26/15 ABD/Pelvic CT With IV Contrast Only = Positive For A Nonobstructing Right Renal Stone    H/O of CABG 11/15/21     CABG X3 With [place]-Vein-Endovascular (Left)     H/O Recurrent Cyclical Nausea     Dr. KIRAN Heck    H/O Small Rectal Polyps On 9/24/18 TC; Nonbleeding, Biopsies Negative For Malignancy     Dr. Jonh Heck; "Repeat TC IN 5 YRs"    Hypercholesterolemia     On Livalo 4 Mg qHS    Hypertension     no longer taking medication since weight loss    Hypothyroidism     On [place] Thyroid    IBS (irritable bowel syndrome)     Left Hip Subcutaneous Nodule     3/5/25 This Resolved On Its Own; 1/16/25 Referred To Dr. Clarence Negron    Lumbar Spinal Degenerative Disc Disease 03/20/2025 1/17/25 Dr. Jean Mascorro Referred To Dr. Surya Enriquez; 3/13/25 L-Spine MRI W/O Contrast = (See Report)    Medium Internal Hemorrhoids With Rectal Bleeding On 9/24/18 EGD     Dr. KIRAN Heck; Will Schedule Clinic Visit For Hemorrhoid Banding    Mitral valve prolapse     Nonalcoholic Steatohepatitis (N.A.S.H.)     Dr. KIRAN Heck On 8/18/16 Ordered A Liver U/S And LFTs    Nonobstructive atherosclerosis of coronary artery     Obesity S/P Sleeve Gastrectomy With Hiatal hernia Repair 10/2015     Dr. Scot Werner    Ocular Migraines     Osteoporosis     10/21/18 RXd Fosamax 70 Mg Weekly, And OTC D3 2K IU Daily, And OTC CaCO3 600 Mg Daily    Partial tear of subscapularis tendon, left, subsequent encounter     08/2020    Pulmonary Nodules     Dr. Luis F Evans Will Repeat This In 1 Year; 8/7/18 Low Dose " "Chest CT = 1 Small Nodule In Each Lung    Rectal polyp     Renal disorder     nephrolithiasis    RLQ Pain Due To Epiploic Appendagitis 08/2016     Dr. KIRAN Heck TXd This With Augmentin And Naproxen After Cipro And Flagyl Failed    Rotator cuff syndrome of left shoulder 08/2020    Rotator cuff syndrome, left 08/10/2020    Rotator cuff syndrome, right 05/10/2021    RUQ Abdominal Pain 5/31/16     6/3/16 ABD/Pelvic CT W/W/O = 1 CM Right Renal Stone Only; 5/31/16 ABD XRays = Unremarkable; 5/31/16 Increased Her PPI To Bid X 1 Week      Head trauma/Loss of consciousness: denies  Seizures: denies     PAST PSYCHIATRIC HISTORY:  Psychiatric Care (current & past):  [facility] 30 years ago  Previous Psychiatric Diagnoses:  Depression and anxiety  Previous Psychiatric Hospitalizations: [facility] 10/16/2022 through 12/21/2022  Previous SI/HI:    Denies  Previous Suicide Attempts or NSSI: denies  History of Psychotherapy:  CBT with LCSW, found helpful  History of Violence: denies    EXAM:  Constitutional  Vitals:  Most recent vital signs were reviewed.   Last 3 sets of VS:      3/20/2025     9:09 AM 5/1/2025    10:26 AM 5/6/2025     3:08 PM   Vitals - 1 value per visit   SYSTOLIC 135 124 100   DIASTOLIC 76 68 61   Pulse 64 63 69   Resp 18 18    Weight (lb) 146.17 146.17 147.05   Weight (kg) 66.3 66.3 66.7   Height 5' 1" (1.549 m) 5' 1" (1.549 m)    BMI (Calculated) 27.6 27.6    Pain Score Four Three       General:  Unremarkable, age-appropriate     Musculoskeletal  Muscle Strength/Tone:  No tremors appreciated   Gait & Station:  Pt seated during virtual visit     Psychiatric  Speech:  no latency; no press   Mood & Affect:  euthymic  congruent and appropriate   Thought Process:  normal and logical   Associations:  intact   Thought Content:  normal, no suicidality, no homicidality, delusions, or paranoia   Insight:  intact   Judgement: behavior is adequate to circumstances   Orientation:  grossly " intact   Memory: intact for content of interview   Language: grossly intact   Attention Span & Concentration:  Intact to interview   Fund of Knowledge:  Not formally tested     SUICIDE RISK ASSESSMENT:  Protective factors: age, gender, no prior attempts, no prior hospitalizations, no family h/o attempts, no ongoing substance abuse, no psychosis, , has children, denies SI/intent/plan, seeking treatment, access to treatment, future oriented,   Risks:  hx depression, hx anxiety, hx IOP attendance, and access to firearms,  Patient is a moderate immediate and long-term risk considering risk factors.  Risk can be ameliorated with medication management and therapy    RELEVANT LABS/STUDIES:    Lab Results   Component Value Date    WBC 10.05 02/01/2025    HGB 12.6 02/01/2025    HCT 37.9 02/01/2025    MCV 89 02/01/2025     02/01/2025     BMP  Lab Results   Component Value Date     02/01/2025    K 3.7 02/01/2025     (H) 02/01/2025    CO2 26 02/01/2025    BUN 16 02/01/2025    CREATININE 0.90 02/01/2025    CALCIUM 9.0 02/01/2025    ANIONGAP 7 (L) 02/01/2025    ESTGFRAFRICA >60 07/07/2022    EGFRNONAA >60 07/07/2022     Lab Results   Component Value Date    ALT 57 (H) 02/01/2025    AST 26 02/01/2025    ALKPHOS 86 02/01/2025    BILITOT 1.2 (H) 02/01/2025     Lab Results   Component Value Date    TSH 0.648 02/01/2025     Lab Results   Component Value Date    LABA1C 5.3 03/29/2016    HGBA1C 4.9 02/01/2025       IMPRESSION:    Chloe Obrien is a 64 y.o. female with history of MDD, KIANA, BED, insomnia, who presents for follow up appointment.    Status/Progress: Based on the examination today, the patient's problem(s) is/are well controlled.  New problems have not been presented today.   Co-morbidities are complicating management of the primary condition.  There are no active rule-out diagnoses for this patient at this time.     -Patient reports improvement in mood but notes continued irritability.  We will  discontinue Wellbutrin XL and start bupropion 75 mg q.a.m. in the hope that this dose will help with mood but not cause irritability.  -Today, patient reports marked improvement in symptoms and states she feels great.  She denies side effects and would prefer to continue current management unchanged.  -marked increase in irritability.  After discussing treatment options with patient we have mutually decided to increase vilazodone to 40 mg daily to target irritability  -long discussion with patient regarding taking medications as prescribed.  Patient has been taking hydroxyzine 25 mg twice daily as opposed to p.r.n. in an effort to decrease anxiety. Through shared decision-making buspirone will be started for generalized anxiety and titrated to a therapeutic dose.  Patient did have a trial of buspirone in 2013 but the dose did not exceed 7.5 mg b.i.d.; patient requests an excuse note for 2 weeks off of work related to current anxiety and irritability as well as increased stress level regarding her job and recent physical ailments.  Note written per patient request.  -anger and irritability markedly reduced.  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  We did discuss that bupropion could be worsening this irritability, however, the patient reports she is taken this for many many years and she does not believe the bupropion is the cause of her increased irritability.  She does not wish to discontinue bupropion as she has seen significant improvement in mood with it.  We also discussed that her Unithroid dose may need to be adjusted as this could affect irritability as well.  Patient has an appointment with her endocrinologist in 1 month.  We discussed adding an SGA such as aripiprazole for irritability however, patient does not wish to add medications at this time and she  is concerned about the possibility of weight gain.  Through shared decision-making buspirone will be increased to 15 mg t.i.d. (patient is currently taking b.i.d.).  Patient agrees to contact me should her irritability worsen.  -irritability has significantly improved after increase in buspirone.  Will continue current treatment plan.  -Pt reports sad mood after the death of her father 2 weeks ago, though she denies depressed mood. She does not wish to make changes to medications at this time. She agrees to contact me should her mood worsen.   -Pt reports her mood has been stable in the interim. Anxiety is well controlled. Through shared decision making, current medication regimen will be continued.    - Patient reports breakthrough episodes of sadness occurring 1-2 times weekly, lasting about an hour.  - Taking hydroxyzine TID for the past month to manage mood and anxiety symptoms, which appears to be helping with mood, anxiety, and irritability.  - Patient reports low energy, potentially due to back pain or hydroxyzine side effects.  - Considered epidural for back pain management, which may also improve mood.  - Stable on Mounjaro for weight management, maintaining weight loss.  - Sleep improved with trazodone, though reports frequent dreams about past events.  - Concentration improved with use of podcasts as background noise.    Risk Parameters:  Patient reports no suicidal ideation  Patient reports no homicidal ideation  Patient reports no self-injurious behavior  Patient reports no violent behavior    DIAGNOSES:    ICD-10-CM ICD-9-CM   1. Recurrent major depressive disorder, in remission  F33.40 296.35   2. Generalized anxiety disorder  F41.1 300.02   3. Insomnia, unspecified type  G47.00 780.52   4. Binge eating disorder in remission  F50.814 307.50       PLAN:  Continue vilazodone 40 mg daily for mood, anxiety, and irritability  Continue bupropion 75 mg q.a.m. for mood  Continue buspirone from 15 mg t.i.d.  for generalized anxiety and irritability  Continue hydroxyzine 50 mg q.h.s. p.r.n. anxiety/insomnia (Pt agrees to stop taking b.i.d. and to only take p.r.n. for severe anxiety / panic)  Continue trazodone 150 mg q.h.s. p.r.n. insomnia (residual insomnia at 100 mg dose)  Continue L-methylfolate 15 milligrams daily due to decreased MTHFR activity  Pt has discontinued regular individual psychotherapy with ERVIN Price LCSW, but will schedule p.r.n. if needed      RETURN TO CLINIC:   3 months      DANY Garcia, PMHNP-BC    42 minutes of total time spent on the encounter, which includes face to face time and non-face to face time preparing to see the patient (eg. review of tests), obtaining and/or reviewing separately obtained history, documenting clinical information in the electronic health record, independently interpreting results (not separately reported), and communicating results to the patient/family/caregiver, or care coordination (not separately reported).     At this time there are no indications the patient represents an imminent danger to either themselves or others; will continue to manage treatment in the outpatient setting.    I discussed the patient's care with the patient including benefits, alternatives, possible adverse effects of the treatment plan; including the potential for metabolic complications, major organ dysfunction, black box warnings, and contraindications. The opportunity was given for questions/clarification, and after this discussion the above treatment plan was devised through shared decision making. The patient voiced their understanding of the diagnoses and treatments listed above and agreed to the treatment plan. Follow up plan was reviewed with the patient. The patient was advised to call to report any worsening of symptoms or problems with medication.    Supportive therapy and psychoeducation provided. Patient has been given crisis information including Suicide and Crisis  "[facility] (call or text: 081). Patient also given instructions to go to the nearest ER or call 911 if unable to remain safe or if the Pt develops thoughts of harming self or others.    Documentation entered by me for this encounter may have been done in part using [employer] voice recognition transcription software. Garbled syntax, mangled pronouns, and other bizarre constructions may be attributed to that software system. Although I have made an effort to ensure accuracy, "sound like" errors may exist and should be interpreted in context.  "

## 2025-06-06 ENCOUNTER — PATIENT MESSAGE (OUTPATIENT)
Dept: PAIN MEDICINE | Facility: CLINIC | Age: 65
End: 2025-06-06
Payer: COMMERCIAL

## 2025-06-06 DIAGNOSIS — M54.16 LUMBAR RADICULOPATHY: Primary | ICD-10-CM

## 2025-06-06 DIAGNOSIS — M47.816 LUMBAR SPONDYLOSIS: ICD-10-CM

## 2025-06-09 ENCOUNTER — TELEPHONE (OUTPATIENT)
Dept: PAIN MEDICINE | Facility: CLINIC | Age: 65
End: 2025-06-09
Payer: COMMERCIAL

## 2025-06-09 DIAGNOSIS — M54.16 LUMBAR RADICULOPATHY: Primary | ICD-10-CM

## 2025-06-09 RX ORDER — SODIUM CHLORIDE, SODIUM LACTATE, POTASSIUM CHLORIDE, CALCIUM CHLORIDE 600; 310; 30; 20 MG/100ML; MG/100ML; MG/100ML; MG/100ML
INJECTION, SOLUTION INTRAVENOUS CONTINUOUS
OUTPATIENT
Start: 2025-06-09

## 2025-06-09 NOTE — TELEPHONE ENCOUNTER
She was seen in clinic 5-1-25 with Assessment and Plan below.  As described we discussed L5/S1 IL FRANCISCO.  Ms. Corona now wants to proceed .  Order placed.  Follow up with pain management physician after FRANCISCO.     Assessment:   She presents for neck pain and for lumbar radiculoapthy. PT without major improvement in level of pain experienced.       She has neck pain without any current radicuar symptoms.  MRI of the neck shows some normal age appropriate degenerative chagne without foraminal narrowing.  Most likely myofascial neck pain.     Lower back pain with lumbar radiculoapthy due to degenerative changes at Lt L4/5, L5/S1 the greatest with some foraminal narrowing.  No neurological deficits.  .     Plan:  - will continue with tylenol for pain.  She is taking plavix and cannot tolerate nsaids at this time.  - continue with PT if exercise and/ or massage is helping; may also consider massage therapist outside of PT.   - if no improvement with PT, we discussed  L5/S1 IL FRANCISCO for lower back and leg pain.  With relatively normal MRI of the cervical spine, there are no interventional procedures or surgical interventions recommended at this time.    - she understands FRANCISCO is an option, but does not want to pursue right now.  She will call back and forllow up with me in the [facility].

## 2025-06-09 NOTE — TELEPHONE ENCOUNTER
Spoke with patient and she accepted appt on 6/26/25. Instruction given and patient voiced understanding.

## 2025-06-16 ENCOUNTER — TELEPHONE (OUTPATIENT)
Dept: PAIN MEDICINE | Facility: CLINIC | Age: 65
End: 2025-06-16
Payer: COMMERCIAL

## 2025-06-16 NOTE — TELEPHONE ENCOUNTER
Type: Needs Medical Advice  Who Called:  the patient     Best Call Back Number: [phone]  Additional Information: pt called to cancel procedure 6/26 and will call back later to r/s

## 2025-06-25 ENCOUNTER — TELEPHONE (OUTPATIENT)
Dept: OTOLARYNGOLOGY | Facility: CLINIC | Age: 65
End: 2025-06-25
Payer: COMMERCIAL

## 2025-06-25 NOTE — TELEPHONE ENCOUNTER
Copied from CRM #[id number]. Topic: Medications - Pharmacy  >> Jun 25, 2025  8:23 AM Amber wrote:  Type:  Pharmacy Calling to Clarify an RX    Name of Caller:regina   [facility]   Prescription Name:vaccine  What do they need to clarify?:   Best Call Back Number:  [facility] - [place], [place] - [address]  [address] [address]  Phone: [phone] Fax: [phone]    Additional Information:

## 2025-06-25 NOTE — TELEPHONE ENCOUNTER
Copied from CRM #[id number]. Topic: General Inquiry - Patient Advice  >> Jun 25, 2025  8:16 AM Go wrote:  Type: Needs Medical Advice  Who Called:  Patient    Best Call Back Number: [phone]   Additional Information: Called to speak with office. States office administered pneumococcal 20 instead of 23. Called to discuss next steps.

## 2025-06-25 NOTE — TELEPHONE ENCOUNTER
S/w pt and advised she did receive the correct vaccine. Pt is calling  [facility] to schedule her lab for 7/28/25.

## 2025-07-10 ENCOUNTER — OFFICE VISIT (OUTPATIENT)
Dept: URGENT CARE | Facility: CLINIC | Age: 65
End: 2025-07-10
Payer: COMMERCIAL

## 2025-07-10 VITALS
DIASTOLIC BLOOD PRESSURE: 64 MMHG | HEIGHT: 61 IN | OXYGEN SATURATION: 98 % | RESPIRATION RATE: 16 BRPM | WEIGHT: 147 LBS | SYSTOLIC BLOOD PRESSURE: 94 MMHG | BODY MASS INDEX: 27.75 KG/M2 | TEMPERATURE: 98 F

## 2025-07-10 DIAGNOSIS — R30.0 DYSURIA: ICD-10-CM

## 2025-07-10 DIAGNOSIS — N30.00 ACUTE CYSTITIS WITHOUT HEMATURIA: Primary | ICD-10-CM

## 2025-07-10 LAB
BILIRUBIN, UA POC OHS: NEGATIVE
BLOOD, UA POC OHS: NEGATIVE
CLARITY, UA POC OHS: CLEAR
COLOR, UA POC OHS: YELLOW
GLUCOSE, UA POC OHS: NEGATIVE
KETONES, UA POC OHS: NEGATIVE
LEUKOCYTES, UA POC OHS: ABNORMAL
NITRITE, UA POC OHS: NEGATIVE
PH, UA POC OHS: 6
PROTEIN, UA POC OHS: ABNORMAL
SPECIFIC GRAVITY, UA POC OHS: >=1.03
UROBILINOGEN, UA POC OHS: 0.2

## 2025-07-10 PROCEDURE — 81003 URINALYSIS AUTO W/O SCOPE: CPT | Mod: QW,S$GLB,, | Performed by: PHYSICIAN ASSISTANT

## 2025-07-10 PROCEDURE — 99214 OFFICE O/P EST MOD 30 MIN: CPT | Mod: S$GLB,,, | Performed by: PHYSICIAN ASSISTANT

## 2025-07-10 RX ORDER — NITROFURANTOIN 25; 75 MG/1; MG/1
100 CAPSULE ORAL 2 TIMES DAILY
Qty: 10 CAPSULE | Refills: 0 | Status: SHIPPED | OUTPATIENT
Start: 2025-07-10 | End: 2025-07-15

## 2025-07-10 NOTE — PROGRESS NOTES
"Subjective:      Patient ID: Chloe Obrien is a 64 y.o. female.    Vitals:  height is 5' 1" (1.549 m) and weight is 66.7 kg (147 lb). Her oral temperature is 97.7 °F (36.5 °C). Her blood pressure is 94/64. Her respiration is 16 and oxygen saturation is 98%.     Chief Complaint: Urinary Tract Infection    Pt states she may have a possible UTI since Monday. Pt is taking no medication. Patient is sexually active with 1 partner. Patient currently denies fever, chills, body aches, active CP, SOB, abdominal pain, N/V, vaginal irritation/itching, vaginal discharge, hematuria or flank pain. Pt states that their is a smell.     Dysuria   This is a new problem. The current episode started in the past 7 days. The problem occurs intermittently. The problem has been unchanged. The quality of the pain is described as burning. The pain is at a severity of 3/10. The pain is mild. There has been no fever. She is Sexually active. There is No history of pyelonephritis. Associated symptoms include frequency, hesitancy and urgency. Pertinent negatives include no behavior changes, chills, discharge, flank pain, hematuria, nausea, possible pregnancy, sweats, vomiting, weight loss, bubble bath use, constipation, rash or withholding. She has tried nothing for the symptoms.       Constitution: Negative for chills, sweating, fatigue and fever.   HENT:  Negative for ear pain, drooling, congestion, sore throat, trouble swallowing and voice change.    Neck: Negative for neck pain, neck stiffness, painful lymph nodes and neck swelling.   Cardiovascular:  Negative for chest pain, leg swelling, palpitations, sob on exertion and passing out.   Eyes:  Negative for eye pain, eye redness, photophobia, double vision, blurred vision and eyelid swelling.   Respiratory:  Negative for chest tightness, cough, sputum production, bloody sputum, shortness of breath, stridor and wheezing.    Gastrointestinal:  Negative for abdominal pain, abdominal bloating, " nausea, vomiting, constipation, diarrhea and heartburn.   Genitourinary:  Positive for dysuria, frequency and urgency. Negative for flank pain, hematuria, vaginal pain, vaginal discharge, vaginal bleeding, vaginal odor, painful intercourse, genital sore and pelvic pain.   Musculoskeletal:  Negative for joint pain, joint swelling, abnormal ROM of joint, back pain, muscle cramps and muscle ache.   Skin:  Negative for rash and hives.   Allergic/Immunologic: Negative for seasonal allergies, food allergies, hives, itching and sneezing.   Neurological:  Negative for dizziness, light-headedness, passing out, loss of balance, headaches, altered mental status, loss of consciousness and seizures.   Hematologic/Lymphatic: Negative for swollen lymph nodes.   Psychiatric/Behavioral:  Negative for altered mental status and nervous/anxious. The patient is not nervous/anxious.       Objective:     Physical Exam   Constitutional: She is oriented to person, place, and time. She appears well-developed. She is cooperative.  Non-toxic appearance. She does not appear ill. No distress.   HENT:   Head: Normocephalic and atraumatic.   Ears:   Right Ear: External ear normal.   Left Ear: External ear normal.   Nose: Nose normal. No epistaxis.   Eyes: Conjunctivae and lids are normal. No scleral icterus.   Neck: Trachea normal and phonation normal. Neck supple. No edema present. No erythema present. No neck rigidity present.   Cardiovascular: Normal rate, regular rhythm, normal heart sounds and normal pulses.   Pulmonary/Chest: Effort normal and breath sounds normal. No accessory muscle usage or stridor. No respiratory distress.   Abdominal: Normal appearance and bowel sounds are normal. Soft. There is no abdominal tenderness. There is no rebound, no guarding, no left CVA tenderness and no right CVA tenderness.   Musculoskeletal: Normal range of motion.         General: No deformity. Normal range of motion.   Neurological: She is alert and  oriented to person, place, and time. She exhibits normal muscle tone. Gait normal. Coordination normal.   Skin: Skin is warm, dry, intact, not diaphoretic, not pale and no rash. Capillary refill takes less than 2 seconds.   Psychiatric: Her speech is normal and behavior is normal. Judgment and thought content normal.   Nursing note and vitals reviewed.      Results for orders placed or performed in visit on 07/10/25   POCT Urinalysis(Instrument)    Collection Time: 07/10/25  1:12 PM   Result Value Ref Range    Color, POC UA Yellow Yellow, Straw, Colorless    Clarity, POC UA Clear Clear    Glucose, POC UA Negative Negative    Bilirubin, POC UA Negative Negative    Ketones, POC UA Negative Negative    Spec Grav POC UA >=1.030 1.005 - 1.030    Blood, POC UA Negative Negative    pH, POC UA 6.0 5.0 - 8.0    Protein, POC UA Trace (A) Negative    Urobilinogen, POC UA 0.2 <=1.0    Nitrite, POC UA Negative Negative    WBC, POC UA Trace (A) Negative     *Note: Due to a large number of results and/or encounters for the requested time period, some results have not been displayed. A complete set of results can be found in Results Review.       Assessment:     1. Acute cystitis without hematuria    2. Dysuria      Plan:   Discussed UA results with patient. Discussed DDX and treatment options with patient - will go ahead and treat empirically with antibiotics due to patient's symptoms at this time. If symptoms persist and/or do not improve, advised patient to come back into clinic for new UA and then will send off Urine Culture if needed. Also advised close follow-up with PCP and/or OBGYN and/or Urology for further evaluation as needed. ER precautions given to patient as well. Patient aware, verbalized understanding and agreed with plan of care.    Acute cystitis without hematuria    Dysuria  -     POCT Urinalysis(Instrument)    Other orders  -     nitrofurantoin, macrocrystal-monohydrate, (MACROBID) 100 MG capsule; Take 1 capsule  (100 mg total) by mouth 2 (two) times daily. for 5 days  Dispense: 10 capsule; Refill: 0      There are no Patient Instructions on file for this visit.

## 2025-07-31 ENCOUNTER — PATIENT MESSAGE (OUTPATIENT)
Dept: OTOLARYNGOLOGY | Facility: CLINIC | Age: 65
End: 2025-07-31
Payer: COMMERCIAL

## 2025-08-28 ENCOUNTER — OFFICE VISIT (OUTPATIENT)
Dept: PSYCHIATRY | Facility: CLINIC | Age: 65
End: 2025-08-28
Payer: COMMERCIAL

## 2025-08-28 DIAGNOSIS — F41.1 GENERALIZED ANXIETY DISORDER: Primary | ICD-10-CM

## 2025-08-28 DIAGNOSIS — F33.40 RECURRENT MAJOR DEPRESSIVE DISORDER, IN REMISSION: ICD-10-CM

## 2025-08-28 DIAGNOSIS — F50.814 BINGE EATING DISORDER IN REMISSION: ICD-10-CM

## 2025-08-28 DIAGNOSIS — G47.00 INSOMNIA, UNSPECIFIED TYPE: ICD-10-CM

## 2025-08-28 PROCEDURE — 4010F ACE/ARB THERAPY RXD/TAKEN: CPT | Mod: CPTII,95,,

## 2025-08-28 PROCEDURE — G2211 COMPLEX E/M VISIT ADD ON: HCPCS | Mod: 95,,,

## 2025-08-28 PROCEDURE — 1160F RVW MEDS BY RX/DR IN RCRD: CPT | Mod: CPTII,95,,

## 2025-08-28 PROCEDURE — 98007 SYNCH AUDIO-VIDEO EST HI 40: CPT | Mod: 95,,,

## 2025-08-28 PROCEDURE — 3044F HG A1C LEVEL LT 7.0%: CPT | Mod: CPTII,95,,

## 2025-08-28 PROCEDURE — 3066F NEPHROPATHY DOC TX: CPT | Mod: CPTII,95,,

## 2025-08-28 PROCEDURE — 1159F MED LIST DOCD IN RCRD: CPT | Mod: CPTII,95,,

## 2025-08-28 PROCEDURE — 3061F NEG MICROALBUMINURIA REV: CPT | Mod: CPTII,95,,

## 2025-08-28 RX ORDER — VILAZODONE HYDROCHLORIDE 40 MG/1
40 TABLET ORAL DAILY
Qty: 90 TABLET | Refills: 1 | Status: SHIPPED | OUTPATIENT
Start: 2025-08-28

## 2025-08-28 RX ORDER — VILAZODONE HYDROCHLORIDE 20 MG/1
20 TABLET ORAL DAILY
Qty: 5 TABLET | Refills: 0 | Status: SHIPPED | OUTPATIENT
Start: 2025-08-28 | End: 2025-08-28

## (undated) DEVICE — KIT SAHARA DRAPE DRAW/LIFT

## (undated) DEVICE — Device

## (undated) DEVICE — SPONGE GAUZE 16PLY 4X4

## (undated) DEVICE — DRAPE SLUSH WARMER WITH DISC

## (undated) DEVICE — DRESSING TELFA N ADH 3X8

## (undated) DEVICE — ADHESIVE DERMABOND ADVANCED

## (undated) DEVICE — BLADE SAW STERNAL 5/BX

## (undated) DEVICE — DRAIN CHEST DRY SUCTION

## (undated) DEVICE — SEE MEDLINE ITEM 157128

## (undated) DEVICE — DRAPE SPLIT STERILE

## (undated) DEVICE — SHEARS HARMONIC CRVD 9 CM

## (undated) DEVICE — SET DECANTER MEDICHOICE

## (undated) DEVICE — SUT PROLENE 4-0 SH BLU 36IN

## (undated) DEVICE — KIT URINARY CATH URINE METER

## (undated) DEVICE — TUBING HF INSUFFLATION W/ FLTR

## (undated) DEVICE — PENCIL ROCKER SWITCH 10FT CORD

## (undated) DEVICE — GAUZE SPONGE 4X4 12PLY

## (undated) DEVICE — DRAIN CHANNEL ROUND 19FR

## (undated) DEVICE — TIP YANKAUERS BULB NO VENT

## (undated) DEVICE — GLOVE BIOGEL PI MICRO SZ 7.5

## (undated) DEVICE — SUT 3-0 VICRYL / SH (J416)

## (undated) DEVICE — TRAY HEART

## (undated) DEVICE — JELLY LUBRICANT STERILE 4 OZ

## (undated) DEVICE — HEMOSTAT SURGICEL NU-KNIT 6X9

## (undated) DEVICE — INSERTS STEALTH FIBRA SIZE 5

## (undated) DEVICE — SUT PROLENE 5-0 BL C-1 4-24

## (undated) DEVICE — SEE MEDLINE ITEM 146417

## (undated) DEVICE — SUT SILK 2-0 SH 18IN BLACK

## (undated) DEVICE — SUT VICRYL 3-0 27 SH

## (undated) DEVICE — SUT PROLENE 7-0 BV-1 30

## (undated) DEVICE — DRESSING ADH ISLAND 3.6 X 14

## (undated) DEVICE — SUT LIGACLIP SMALL XTRA

## (undated) DEVICE — CLIP SPRING 6MM

## (undated) DEVICE — DRESSING TELFA STRL 4X3 LF

## (undated) DEVICE — ELECTRODE REM PLYHSV RETURN 9

## (undated) DEVICE — SEE MEDLINE ITEM 152515

## (undated) DEVICE — SUT VICRYL BR 1 GEN 27 CT-1

## (undated) DEVICE — BLADE 4IN EDGE INSULATED

## (undated) DEVICE — BLOWER MISTER

## (undated) DEVICE — PLEDGET SUT SOFT 3/8X3/16X1/16

## (undated) DEVICE — SUT 2/0 36IN COATED VICRYL

## (undated) DEVICE — BANDAGE ELAS SOFTWRAP ST 4X5YD

## (undated) DEVICE — SUT 6 18IN STEEL MONO CCS

## (undated) DEVICE — BANDAGE ACE ELASTIC 6"

## (undated) DEVICE — WIRE INTRAMYOCARDIAL TEMP

## (undated) DEVICE — CANNULA VESSEL FREE FLOW

## (undated) DEVICE — TAPE SURG MEDIPORE 6X72IN

## (undated) DEVICE — SUT 4-0 12-18IN SILK BLACK

## (undated) DEVICE — SYR 3CC LUER LOC

## (undated) DEVICE — NDL 18GA X1 1/2 REG BEVEL

## (undated) DEVICE — SEE MEDLINE ITEM 157117

## (undated) DEVICE — GLOVE BIOGEL 7.5

## (undated) DEVICE — SEE L#120831

## (undated) DEVICE — SYS VIRTUOSAPH PLUS EVM

## (undated) DEVICE — TAPE SILK 3IN

## (undated) DEVICE — SEE MEDLINE ITEM 152622

## (undated) DEVICE — SUT SILK BLK BR. 2 2-60

## (undated) DEVICE — DRESSING TRANS 4X4 TEGADERM

## (undated) DEVICE — DRESSING AQUACEL SACRAL 9 X 9

## (undated) DEVICE — SEE MEDLINE ITEM 157148

## (undated) DEVICE — SUT PROLENE 4-0 RB-1 BL MO

## (undated) DEVICE — BANDAGE ELAS SOFTWRAP ST 6X5YD

## (undated) DEVICE — WIPE ESENTA BARR STNG FREE 3ML

## (undated) DEVICE — RETRACTOR OCTOBASE INSERT HOLD